# Patient Record
Sex: MALE | Race: WHITE | NOT HISPANIC OR LATINO | Employment: OTHER | URBAN - METROPOLITAN AREA
[De-identification: names, ages, dates, MRNs, and addresses within clinical notes are randomized per-mention and may not be internally consistent; named-entity substitution may affect disease eponyms.]

---

## 2017-01-30 DIAGNOSIS — E78.5 HYPERLIPIDEMIA, UNSPECIFIED HYPERLIPIDEMIA TYPE: Primary | ICD-10-CM

## 2017-01-30 LAB
ALBUMIN SERPL-MCNC: 4.8 G/DL (ref 3.5–5.2)
ALBUMIN/GLOB SERPL: 1.8 G/DL
ALP SERPL-CCNC: 67 U/L (ref 39–117)
ALT SERPL-CCNC: 24 U/L (ref 1–41)
AST SERPL-CCNC: 18 U/L (ref 1–40)
BILIRUB SERPL-MCNC: 0.4 MG/DL (ref 0.1–1.2)
BUN SERPL-MCNC: 11 MG/DL (ref 6–20)
BUN/CREAT SERPL: 11.6 (ref 7–25)
CALCIUM SERPL-MCNC: 9.9 MG/DL (ref 8.6–10.5)
CHLORIDE SERPL-SCNC: 101 MMOL/L (ref 98–107)
CHOLEST SERPL-MCNC: 232 MG/DL (ref 0–200)
CO2 SERPL-SCNC: 26.3 MMOL/L (ref 22–29)
CREAT SERPL-MCNC: 0.95 MG/DL (ref 0.76–1.27)
GLOBULIN SER CALC-MCNC: 2.6 GM/DL
GLUCOSE SERPL-MCNC: 111 MG/DL (ref 65–99)
HDLC SERPL-MCNC: 57 MG/DL (ref 40–60)
LDLC SERPL CALC-MCNC: 139 MG/DL (ref 0–100)
LDLC/HDLC SERPL: 2.44 {RATIO}
POTASSIUM SERPL-SCNC: 4.7 MMOL/L (ref 3.5–5.2)
PROT SERPL-MCNC: 7.4 G/DL (ref 6–8.5)
SODIUM SERPL-SCNC: 141 MMOL/L (ref 136–145)
TRIGL SERPL-MCNC: 180 MG/DL (ref 0–150)
VLDLC SERPL CALC-MCNC: 36 MG/DL (ref 5–40)

## 2017-02-10 ENCOUNTER — OFFICE VISIT (OUTPATIENT)
Dept: FAMILY MEDICINE CLINIC | Facility: CLINIC | Age: 62
End: 2017-02-10

## 2017-02-10 VITALS
WEIGHT: 204.4 LBS | HEART RATE: 72 BPM | OXYGEN SATURATION: 98 % | BODY MASS INDEX: 30.27 KG/M2 | RESPIRATION RATE: 16 BRPM | DIASTOLIC BLOOD PRESSURE: 72 MMHG | HEIGHT: 69 IN | SYSTOLIC BLOOD PRESSURE: 122 MMHG | TEMPERATURE: 98.4 F

## 2017-02-10 DIAGNOSIS — I10 ESSENTIAL HYPERTENSION: Primary | ICD-10-CM

## 2017-02-10 DIAGNOSIS — I51.9 HEART DISEASE: ICD-10-CM

## 2017-02-10 DIAGNOSIS — E78.2 MIXED HYPERLIPIDEMIA: ICD-10-CM

## 2017-02-10 DIAGNOSIS — J10.1 INFLUENZA B: ICD-10-CM

## 2017-02-10 DIAGNOSIS — M50.90 CERVICAL DISC DISEASE: ICD-10-CM

## 2017-02-10 DIAGNOSIS — R50.9 FEVER AND CHILLS: ICD-10-CM

## 2017-02-10 LAB
EXPIRATION DATE: ABNORMAL
FLUAV AG NPH QL: NEGATIVE
FLUBV AG NPH QL: POSITIVE
INTERNAL CONTROL: ABNORMAL
Lab: ABNORMAL

## 2017-02-10 PROCEDURE — 87804 INFLUENZA ASSAY W/OPTIC: CPT | Performed by: NURSE PRACTITIONER

## 2017-02-10 PROCEDURE — 99214 OFFICE O/P EST MOD 30 MIN: CPT | Performed by: NURSE PRACTITIONER

## 2017-02-10 RX ORDER — CYCLOBENZAPRINE HCL 10 MG
10 TABLET ORAL 3 TIMES DAILY PRN
Qty: 90 TABLET | Refills: 1 | Status: SHIPPED | OUTPATIENT
Start: 2017-02-10 | End: 2017-09-07

## 2017-02-10 RX ORDER — ASPIRIN 81 MG/1
81 TABLET ORAL EVERY MORNING
COMMUNITY

## 2017-02-10 RX ORDER — METHYLPREDNISOLONE 4 MG/1
TABLET ORAL
Qty: 21 TABLET | Refills: 0 | Status: SHIPPED | OUTPATIENT
Start: 2017-02-10 | End: 2017-06-09

## 2017-02-10 RX ORDER — UBIDECARENONE 75 MG
50 CAPSULE ORAL DAILY
COMMUNITY
End: 2019-07-10

## 2017-02-10 RX ORDER — MULTIVIT WITH MINERALS/LUTEIN
250 TABLET ORAL EVERY MORNING
COMMUNITY

## 2017-02-10 RX ORDER — OSELTAMIVIR PHOSPHATE 75 MG/1
75 CAPSULE ORAL 2 TIMES DAILY
Qty: 10 CAPSULE | Refills: 0 | Status: SHIPPED | OUTPATIENT
Start: 2017-02-10 | End: 2017-06-09

## 2017-02-10 NOTE — PROGRESS NOTES
Subjective   Sharath Montero is a 61 y.o. male. Patient is here today for   Chief Complaint   Patient presents with   • Follow-up     labs           Vitals:    02/10/17 0835   BP: 122/72   Pulse: 72   Resp: 16   Temp: 98.4 °F (36.9 °C)   SpO2: 98%           Past Medical History   Diagnosis Date   • Cervical disc disease    • Coronary artery disease    • Heart disease    • History of positive PPD      as a child    • Hyperlipidemia    • Hypertension       No Known Allergies   Social History     Social History   • Marital status:      Spouse name: N/A   • Number of children: N/A   • Years of education: N/A     Occupational History   • Not on file.     Social History Main Topics   • Smoking status: Never Smoker   • Smokeless tobacco: Not on file   • Alcohol use Yes      Comment: 4/week    • Drug use: No   • Sexual activity: Not on file     Other Topics Concern   • Not on file     Social History Narrative        Current Outpatient Prescriptions:   •  aspirin 81 MG EC tablet, Take 81 mg by mouth Daily., Disp: , Rfl:   •  vitamin B-12 (CYANOCOBALAMIN) 100 MCG tablet, Take 50 mcg by mouth Daily., Disp: , Rfl:   •  vitamin C (ASCORBIC ACID) 250 MG tablet, Take 250 mg by mouth Daily., Disp: , Rfl:   •  cyclobenzaprine (FLEXERIL) 10 MG tablet, Take 1 tablet by mouth 3 (Three) Times a Day As Needed for muscle spasms., Disp: 90 tablet, Rfl: 1  •  MethylPREDNISolone (MEDROL, QUINTON,) 4 MG tablet, Take as directed on package instructions., Disp: 21 tablet, Rfl: 0  •  oseltamivir (TAMIFLU) 75 MG capsule, Take 1 capsule by mouth 2 (Two) Times a Day., Disp: 10 capsule, Rfl: 0  •  ramipril (ALTACE) 10 MG capsule, Take 1 capsule by mouth daily., Disp: 90 capsule, Rfl: 1  •  simvastatin (ZOCOR) 80 MG tablet, Take 1/2 tablet po QHS, Disp: 90 tablet, Rfl: 1     Objective   History of Present Illness Sharath is here today for a lab follow up related to his hyperlipidemia. He also has a history of CAD and HTN.   He is currently on  statin therapy with 40mg Zocor daily.  He admits to non-compliance with diet and exercise but does report taking medication as directed.  Today his lipid panel is worse than labs obtained in August 2016 with elevation in total cholesterol and LDL.  He also is concerned about his neck pain and cold like symptoms that he has had for the past couple of days.  He is reporting fatigue, low grade fever, nasal congestion and body aches.  He does have a history of cervical disc disease, but this exacerbation began 2 days ago after using a chain saw.  He denies numbness, weakness, or chest pain.    The following portions of the patient's history were reviewed and updated as appropriate: allergies, current medications, past family history, past medical history, past social history, past surgical history and problem list.  Review of Systems   Constitutional: Positive for fatigue and fever. Negative for chills.   HENT: Positive for congestion, rhinorrhea, sinus pressure and sore throat. Negative for sneezing.    Respiratory: Positive for cough and shortness of breath. Negative for chest tightness and wheezing.    Cardiovascular: Negative for chest pain.   Musculoskeletal: Positive for back pain and neck pain.   Neurological: Positive for headaches. Negative for dizziness, weakness, light-headedness and numbness.       Physical Exam   Constitutional: He is oriented to person, place, and time. Vital signs are normal. He appears well-developed. He is cooperative. No distress.   HENT:   Head: Normocephalic.   Right Ear: Tympanic membrane, external ear and ear canal normal.   Left Ear: Tympanic membrane, external ear and ear canal normal.   Nose: Rhinorrhea present. Right sinus exhibits no maxillary sinus tenderness and no frontal sinus tenderness. Left sinus exhibits no maxillary sinus tenderness and no frontal sinus tenderness.   Mouth/Throat: Uvula is midline. Posterior oropharyngeal erythema present.   Neck: Neck supple.    Cardiovascular: Regular rhythm and normal heart sounds.    Pulmonary/Chest: Effort normal and breath sounds normal.   Musculoskeletal:        Right shoulder: He exhibits pain. He exhibits normal range of motion, no spasm and normal strength.        Cervical back: He exhibits pain. He exhibits normal range of motion, no tenderness and no spasm.   Neurological: He is alert and oriented to person, place, and time. He has normal strength.   Skin: Skin is warm, dry and intact.   Psychiatric: He has a normal mood and affect.   Vitals reviewed.      ASSESSMENT  Problem List Items Addressed This Visit     Heart disease    Hyperlipidemia    Essential hypertension - Primary    Cervical disc disease      Other Visit Diagnoses     Influenza B        Relevant Medications    oseltamivir (TAMIFLU) 75 MG capsule    Fever and chills        Relevant Orders    POC Influenza A / B (Completed)          PLAN    There are no Patient Instructions on file for this visit.  No Follow-up on file.    Discussed with patient the importance of diet and exercise in addition to mediation compliance in order to decrease his cardiac risks, he verbalized understanding.    Flu swab is positive for influenza B and will give Tamiflu.  Encouraged rest, plenty of fluids, attention to hand hygiene, cough etiquette, and need to disinfect household to reduce transmission.  He may use acetaminophen/ibuprofen for pain/fever.  No work until fever free x2 days.  Flexeril and Medrol Dose pack as directed for back/neck/shoulder pain.  For persisent symptoms unrelieved by medications, discussed obtaining imaging for further evaluation and PT  Patient agreeable to plan. He will follow up if symptoms worsen or no improvement in neck pain   Follow up in office with labs in 4 months, (CMP, LIPID panel) or sooner if symptoms worsen, fail to improve, or new problems/concerns develop.    JESSICA Pena NP Student documenting for and evaluating with MYLES Covarrubias.

## 2017-05-09 ENCOUNTER — TELEPHONE (OUTPATIENT)
Dept: FAMILY MEDICINE CLINIC | Facility: CLINIC | Age: 62
End: 2017-05-09

## 2017-05-09 RX ORDER — RAMIPRIL 10 MG/1
10 CAPSULE ORAL DAILY
Qty: 90 CAPSULE | Refills: 1 | Status: SHIPPED | OUTPATIENT
Start: 2017-05-09 | End: 2017-05-16 | Stop reason: SDUPTHER

## 2017-05-09 RX ORDER — SIMVASTATIN 80 MG
TABLET ORAL
Qty: 90 TABLET | Refills: 1 | Status: SHIPPED | OUTPATIENT
Start: 2017-05-09 | End: 2017-05-16 | Stop reason: SDUPTHER

## 2017-05-16 RX ORDER — SIMVASTATIN 80 MG
TABLET ORAL
Qty: 90 TABLET | Refills: 1 | Status: SHIPPED | OUTPATIENT
Start: 2017-05-16 | End: 2017-09-27 | Stop reason: SDUPTHER

## 2017-05-16 RX ORDER — RAMIPRIL 10 MG/1
10 CAPSULE ORAL DAILY
Qty: 90 CAPSULE | Refills: 1 | Status: SHIPPED | OUTPATIENT
Start: 2017-05-16 | End: 2017-09-27 | Stop reason: SDUPTHER

## 2017-06-09 ENCOUNTER — OFFICE VISIT (OUTPATIENT)
Dept: FAMILY MEDICINE CLINIC | Facility: CLINIC | Age: 62
End: 2017-06-09

## 2017-06-09 VITALS
TEMPERATURE: 97.5 F | DIASTOLIC BLOOD PRESSURE: 68 MMHG | SYSTOLIC BLOOD PRESSURE: 120 MMHG | WEIGHT: 203.8 LBS | HEART RATE: 66 BPM | BODY MASS INDEX: 30.18 KG/M2 | HEIGHT: 69 IN | OXYGEN SATURATION: 94 %

## 2017-06-09 DIAGNOSIS — E78.2 MIXED HYPERLIPIDEMIA: ICD-10-CM

## 2017-06-09 DIAGNOSIS — I10 ESSENTIAL HYPERTENSION: Primary | ICD-10-CM

## 2017-06-09 DIAGNOSIS — I51.9 HEART DISEASE: ICD-10-CM

## 2017-06-09 PROCEDURE — 99213 OFFICE O/P EST LOW 20 MIN: CPT | Performed by: NURSE PRACTITIONER

## 2017-06-09 NOTE — PROGRESS NOTES
Subjective   Sharath Montero is a 61 y.o. male. Patient is here today for   Chief Complaint   Patient presents with   • Hyperlipidemia     4 month follow up           Vitals:    06/09/17 0759   BP: 120/68   Pulse: 66   Temp: 97.5 °F (36.4 °C)   SpO2: 94%       The following portions of the patient's history were reviewed and updated as appropriate: allergies, current medications, past family history, past medical history, past social history, past surgical history and problem list.    Past Medical History:   Diagnosis Date   • Cervical disc disease    • Coronary artery disease    • Heart disease    • History of positive PPD     as a child    • Hyperlipidemia    • Hypertension       No Known Allergies   Social History     Social History   • Marital status:      Spouse name: N/A   • Number of children: N/A   • Years of education: N/A     Occupational History   • Not on file.     Social History Main Topics   • Smoking status: Never Smoker   • Smokeless tobacco: Not on file   • Alcohol use Yes      Comment: 4/week    • Drug use: No   • Sexual activity: Not on file     Other Topics Concern   • Not on file     Social History Narrative        Current Outpatient Prescriptions:   •  aspirin 81 MG EC tablet, Take 81 mg by mouth Daily., Disp: , Rfl:   •  cyclobenzaprine (FLEXERIL) 10 MG tablet, Take 1 tablet by mouth 3 (Three) Times a Day As Needed for muscle spasms., Disp: 90 tablet, Rfl: 1  •  MethylPREDNISolone (MEDROL, QUINTON,) 4 MG tablet, Take as directed on package instructions., Disp: 21 tablet, Rfl: 0  •  oseltamivir (TAMIFLU) 75 MG capsule, Take 1 capsule by mouth 2 (Two) Times a Day., Disp: 10 capsule, Rfl: 0  •  ramipril (ALTACE) 10 MG capsule, Take 1 capsule by mouth Daily., Disp: 90 capsule, Rfl: 1  •  simvastatin (ZOCOR) 80 MG tablet, Take 1/2 tablet po QHS, Disp: 90 tablet, Rfl: 1  •  vitamin B-12 (CYANOCOBALAMIN) 100 MCG tablet, Take 50 mcg by mouth Daily., Disp: , Rfl:   •  vitamin C (ASCORBIC ACID) 250  MG tablet, Take 250 mg by mouth Daily., Disp: , Rfl:      Objective   History of Present Illness  Sharath is here for a 4 month follow up for HTN, and HLD. He also has a history of CAD and has seen Dr Mcintosh in the past. He has been compliant with his medication and is not experiencing any side effects. He was not scheduled for labs prior to this appt but he is fasting. He denies CP, SOA, or palpiations. He still travels a lot but is working on eating healthier and losing weight. He has no complaints today.     Review of Systems   Constitutional: Negative for chills, fatigue and fever.   HENT: Negative.    Respiratory: Negative for cough, chest tightness, shortness of breath and wheezing.    Cardiovascular: Negative for chest pain, palpitations and leg swelling.   Genitourinary: Negative.    Musculoskeletal: Positive for neck pain (occasional ). Negative for gait problem.   Skin: Negative for rash.   Psychiatric/Behavioral: The patient is not nervous/anxious.        Physical Exam   Constitutional: Vital signs are normal. He appears well-developed and well-nourished. No distress.   HENT:   Nose: Nose normal.   Mouth/Throat: Mucous membranes are normal.   Neck: Trachea normal. Normal carotid pulses present. Carotid bruit is not present.   Cardiovascular: Normal rate, regular rhythm and normal heart sounds.    Pulmonary/Chest: Effort normal and breath sounds normal.   Musculoskeletal:        Right lower leg: He exhibits no swelling and no edema.        Left lower leg: He exhibits no swelling.   Neurological: He is alert.   Skin: Skin is warm and dry. No rash noted.   Psychiatric: He has a normal mood and affect. Cognition and memory are normal.       ASSESSMENT  Problem List Items Addressed This Visit     Heart disease    Hyperlipidemia    Essential hypertension - Primary          PLAN    Will check labs today and call with results  Heart healthy diet and exercise for AHA guidelines  His blood pressure is well  controlled  Continue current medications  Follow up in 6 months or sooner if needed

## 2017-06-10 LAB
ALBUMIN SERPL-MCNC: 4.6 G/DL (ref 3.5–5.5)
ALBUMIN/GLOB SERPL: 1.8 {RATIO} (ref 1.2–2.2)
ALP SERPL-CCNC: 61 IU/L (ref 39–117)
ALT SERPL-CCNC: 24 IU/L (ref 0–44)
AST SERPL-CCNC: 24 IU/L (ref 0–40)
BILIRUB SERPL-MCNC: 0.3 MG/DL (ref 0–1.2)
BUN SERPL-MCNC: 22 MG/DL (ref 6–24)
BUN/CREAT SERPL: 21 (ref 9–20)
CALCIUM SERPL-MCNC: 9.5 MG/DL (ref 8.7–10.2)
CHLORIDE SERPL-SCNC: 102 MMOL/L (ref 96–106)
CHOLEST SERPL-MCNC: 154 MG/DL (ref 100–199)
CO2 SERPL-SCNC: 21 MMOL/L (ref 18–29)
CREAT SERPL-MCNC: 1.06 MG/DL (ref 0.76–1.27)
GLOBULIN SER CALC-MCNC: 2.5 G/DL (ref 1.5–4.5)
GLUCOSE SERPL-MCNC: 101 MG/DL (ref 65–99)
HDLC SERPL-MCNC: 50 MG/DL
LDLC SERPL CALC-MCNC: 84 MG/DL (ref 0–99)
LDLC/HDLC SERPL: 1.7 RATIO UNITS (ref 0–3.6)
POTASSIUM SERPL-SCNC: 4.3 MMOL/L (ref 3.5–5.2)
PROT SERPL-MCNC: 7.1 G/DL (ref 6–8.5)
SODIUM SERPL-SCNC: 142 MMOL/L (ref 134–144)
TRIGL SERPL-MCNC: 99 MG/DL (ref 0–149)
VLDLC SERPL CALC-MCNC: 20 MG/DL (ref 5–40)

## 2017-06-12 ENCOUNTER — TELEPHONE (OUTPATIENT)
Dept: FAMILY MEDICINE CLINIC | Facility: CLINIC | Age: 62
End: 2017-06-12

## 2017-06-12 NOTE — TELEPHONE ENCOUNTER
Reviewed labs all look good  I called the patient to discuss and left a VM for him to call me back

## 2017-09-07 ENCOUNTER — OFFICE VISIT (OUTPATIENT)
Dept: FAMILY MEDICINE CLINIC | Facility: CLINIC | Age: 62
End: 2017-09-07

## 2017-09-07 VITALS
WEIGHT: 210 LBS | RESPIRATION RATE: 18 BRPM | DIASTOLIC BLOOD PRESSURE: 70 MMHG | TEMPERATURE: 97.9 F | HEART RATE: 77 BPM | SYSTOLIC BLOOD PRESSURE: 130 MMHG | BODY MASS INDEX: 31.1 KG/M2 | HEIGHT: 69 IN

## 2017-09-07 DIAGNOSIS — J02.9 PHARYNGITIS, UNSPECIFIED ETIOLOGY: Primary | ICD-10-CM

## 2017-09-07 LAB
EXPIRATION DATE: NORMAL
INTERNAL CONTROL: NORMAL
Lab: NORMAL
S PYO AG THROAT QL: NEGATIVE

## 2017-09-07 PROCEDURE — 87880 STREP A ASSAY W/OPTIC: CPT | Performed by: INTERNAL MEDICINE

## 2017-09-07 PROCEDURE — 99213 OFFICE O/P EST LOW 20 MIN: CPT | Performed by: INTERNAL MEDICINE

## 2017-09-07 NOTE — PROGRESS NOTES
Subjective   Sharath Montero is a 61 y.o. male. Patient is here today for   Chief Complaint   Patient presents with   • Sore Throat     x 1.5 days           Vitals:    09/07/17 1423   BP: 130/70   Pulse: 77   Resp: 18   Temp: 97.9 °F (36.6 °C)     The following portions of the patient's history were reviewed and updated as appropriate: allergies, current medications, past family history, past medical history, past social history, past surgical history and problem list.    Past Medical History:   Diagnosis Date   • Cervical disc disease    • Coronary artery disease    • Heart disease    • History of positive PPD     as a child    • Hyperlipidemia    • Hypertension       No Known Allergies   Social History     Social History   • Marital status:      Spouse name: N/A   • Number of children: N/A   • Years of education: N/A     Occupational History   • Not on file.     Social History Main Topics   • Smoking status: Never Smoker   • Smokeless tobacco: Not on file   • Alcohol use Yes      Comment: 4/week    • Drug use: No   • Sexual activity: Not on file     Other Topics Concern   • Not on file     Social History Narrative        Current Outpatient Prescriptions:   •  aspirin 81 MG EC tablet, Take 81 mg by mouth Daily., Disp: , Rfl:   •  ramipril (ALTACE) 10 MG capsule, Take 1 capsule by mouth Daily., Disp: 90 capsule, Rfl: 1  •  simvastatin (ZOCOR) 80 MG tablet, Take 1/2 tablet po QHS, Disp: 90 tablet, Rfl: 1  •  vitamin B-12 (CYANOCOBALAMIN) 100 MCG tablet, Take 50 mcg by mouth Daily., Disp: , Rfl:   •  vitamin C (ASCORBIC ACID) 250 MG tablet, Take 250 mg by mouth Daily., Disp: , Rfl:      Objective     History of Present Illness Sharath complains of a sore throat and some jaw tenderness that started today and half ago.  He denies any fever but has had some chills.  He also has some sinus congestion.  He denies coughing.    Review of Systems   Constitutional: Positive for chills. Negative for fever.   HENT:  Positive for congestion, postnasal drip and sore throat.    Respiratory: Negative for cough.        Physical Exam   Constitutional: He appears well-developed and well-nourished.   HENT:   Nose: Nose normal.   Mouth/Throat: No oropharyngeal exudate.   Pulmonary/Chest: Effort normal and breath sounds normal.   Lymphadenopathy:     He has no cervical adenopathy.   Vitals reviewed.      ASSESSMENT     Problem List Items Addressed This Visit        Respiratory    Pharyngitis - Primary    Relevant Orders    POC Rapid Strep A (Completed)          PLAN  Patient Instructions   Strep test today is negative.  You have a viral pharyngitis.  Take Tylenol or ibuprofen as needed.    No Follow-up on file.

## 2017-09-07 NOTE — PATIENT INSTRUCTIONS
Strep test today is negative.  You have a viral pharyngitis.  Take Tylenol or ibuprofen as needed.

## 2017-09-27 RX ORDER — RAMIPRIL 10 MG/1
10 CAPSULE ORAL DAILY
Qty: 90 CAPSULE | Refills: 1 | Status: SHIPPED | OUTPATIENT
Start: 2017-09-27 | End: 2018-02-19 | Stop reason: SDUPTHER

## 2017-09-27 RX ORDER — SIMVASTATIN 80 MG
TABLET ORAL
Qty: 90 TABLET | Refills: 1 | Status: SHIPPED | OUTPATIENT
Start: 2017-09-27 | End: 2018-02-19 | Stop reason: SDUPTHER

## 2017-10-03 ENCOUNTER — TELEPHONE (OUTPATIENT)
Dept: FAMILY MEDICINE CLINIC | Facility: CLINIC | Age: 62
End: 2017-10-03

## 2017-10-03 RX ORDER — BACLOFEN 10 MG/1
10 TABLET ORAL 3 TIMES DAILY
Qty: 30 TABLET | Refills: 1 | Status: SHIPPED | OUTPATIENT
Start: 2017-10-03 | End: 2019-04-03

## 2017-10-03 NOTE — TELEPHONE ENCOUNTER
Sharath called for some advice. He has been have some bilateral jaw pain at the TMJ for 3 weeks. He does tend to grind his teeth at night. He reports the area is sore and feels swollen but there is no redness, warmth. He denies fever, sore throat, chills or aches. It is uncomfortable when eating but he is still able to eat. He denies headaches, or ear pain  I recommend that he schedule an appt with his dentist and can try tylenol and warm compresses  I called him in some baclofen to see if that would relax his jaw at night   Suggest bite guard  If not a dental issue then recommend that he follow up with me. May need referral to TMJ specialist     ----- Message from Marion Perdue MA sent at 10/3/2017  2:17 PM EDT -----  Contact: PT  PT REQUESTED YOU CALL HIM HE DID NOT TELL ME WHAT FOR HE CAN BE REACHED -[phone]

## 2017-12-08 DIAGNOSIS — E78.2 MIXED HYPERLIPIDEMIA: Primary | ICD-10-CM

## 2017-12-08 DIAGNOSIS — Z11.59 NEED FOR HEPATITIS C SCREENING TEST: ICD-10-CM

## 2017-12-19 LAB
ALBUMIN SERPL-MCNC: 4.7 G/DL (ref 3.5–5.2)
ALBUMIN/GLOB SERPL: 1.7 G/DL
ALP SERPL-CCNC: 68 U/L (ref 39–117)
ALT SERPL-CCNC: 31 U/L (ref 1–41)
AST SERPL-CCNC: 25 U/L (ref 1–40)
BILIRUB SERPL-MCNC: 0.3 MG/DL (ref 0.1–1.2)
BUN SERPL-MCNC: 13 MG/DL (ref 6–20)
BUN/CREAT SERPL: 13.1 (ref 7–25)
CALCIUM SERPL-MCNC: 9.3 MG/DL (ref 8.6–10.5)
CHLORIDE SERPL-SCNC: 102 MMOL/L (ref 98–107)
CHOLEST SERPL-MCNC: 182 MG/DL (ref 0–200)
CO2 SERPL-SCNC: 22.9 MMOL/L (ref 22–29)
CREAT SERPL-MCNC: 0.99 MG/DL (ref 0.76–1.27)
GFR SERPLBLD CREATININE-BSD FMLA CKD-EPI: 77 ML/MIN/1.73
GFR SERPLBLD CREATININE-BSD FMLA CKD-EPI: 94 ML/MIN/1.73
GLOBULIN SER CALC-MCNC: 2.7 GM/DL
GLUCOSE SERPL-MCNC: 119 MG/DL (ref 65–99)
HCV AB S/CO SERPL IA: <0.1 S/CO RATIO (ref 0–0.9)
HCV AB SERPL QL IA: NORMAL
HDLC SERPL-MCNC: 50 MG/DL (ref 40–60)
LDLC SERPL CALC-MCNC: 99 MG/DL (ref 0–100)
LDLC/HDLC SERPL: 1.98 {RATIO}
POTASSIUM SERPL-SCNC: 4.9 MMOL/L (ref 3.5–5.2)
PROT SERPL-MCNC: 7.4 G/DL (ref 6–8.5)
SODIUM SERPL-SCNC: 140 MMOL/L (ref 136–145)
TRIGL SERPL-MCNC: 164 MG/DL (ref 0–150)
VLDLC SERPL CALC-MCNC: 32.8 MG/DL (ref 5–40)

## 2017-12-29 ENCOUNTER — OFFICE VISIT (OUTPATIENT)
Dept: FAMILY MEDICINE CLINIC | Facility: CLINIC | Age: 62
End: 2017-12-29

## 2017-12-29 VITALS
DIASTOLIC BLOOD PRESSURE: 72 MMHG | TEMPERATURE: 97.6 F | OXYGEN SATURATION: 98 % | SYSTOLIC BLOOD PRESSURE: 130 MMHG | RESPIRATION RATE: 16 BRPM | HEART RATE: 70 BPM | WEIGHT: 214.8 LBS | HEIGHT: 69 IN | BODY MASS INDEX: 31.81 KG/M2

## 2017-12-29 DIAGNOSIS — I10 ESSENTIAL HYPERTENSION: Primary | ICD-10-CM

## 2017-12-29 DIAGNOSIS — I51.9 HEART DISEASE: ICD-10-CM

## 2017-12-29 DIAGNOSIS — E78.2 MIXED HYPERLIPIDEMIA: ICD-10-CM

## 2017-12-29 DIAGNOSIS — R73.9 HYPERGLYCEMIA: ICD-10-CM

## 2017-12-29 PROBLEM — J02.9 PHARYNGITIS: Status: RESOLVED | Noted: 2017-09-07 | Resolved: 2017-12-29

## 2017-12-29 PROCEDURE — 99214 OFFICE O/P EST MOD 30 MIN: CPT | Performed by: NURSE PRACTITIONER

## 2017-12-29 NOTE — PROGRESS NOTES
Subjective   Elijah Montero is a 62 y.o. male. Patient is here today for   Chief Complaint   Patient presents with   • Follow-up     LABS HLD AND HTN           Vitals:    12/29/17 0810   BP: 130/72   Pulse: 70   Resp: 16   Temp: 97.6 °F (36.4 °C)   SpO2: 98%       The following portions of the patient's history were reviewed and updated as appropriate: allergies, current medications, past family history, past medical history, past social history, past surgical history and problem list.    Past Medical History:   Diagnosis Date   • Cervical disc disease    • Coronary artery disease    • Heart disease    • History of positive PPD     as a child    • Hyperlipidemia    • Hypertension       No Known Allergies   Social History     Social History   • Marital status:      Spouse name: N/A   • Number of children: N/A   • Years of education: N/A     Occupational History   • Not on file.     Social History Main Topics   • Smoking status: Never Smoker   • Smokeless tobacco: Never Used   • Alcohol use Yes      Comment: 4/week    • Drug use: No   • Sexual activity: Not on file     Other Topics Concern   • Not on file     Social History Narrative   • No narrative on file        Current Outpatient Prescriptions:   •  aspirin 81 MG EC tablet, Take 81 mg by mouth Daily., Disp: , Rfl:   •  baclofen (LIORESAL) 10 MG tablet, Take 1 tablet by mouth 3 (Three) Times a Day. As needed, Disp: 30 tablet, Rfl: 1  •  ramipril (ALTACE) 10 MG capsule, Take 1 capsule by mouth Daily., Disp: 90 capsule, Rfl: 1  •  simvastatin (ZOCOR) 80 MG tablet, Take 1/2 tablet po QHS, Disp: 90 tablet, Rfl: 1  •  vitamin B-12 (CYANOCOBALAMIN) 100 MCG tablet, Take 50 mcg by mouth Daily., Disp: , Rfl:   •  vitamin C (ASCORBIC ACID) 250 MG tablet, Take 250 mg by mouth Daily., Disp: , Rfl:      Objective   History of Present Illness   Elijah is here for a labs follow up. He has a history of CAD with stent placement, HLD, and HTN. He overall has been feeling well.  He has not been eating well but has been exercising. He has had not recent illness. He is compliant with his medication and is not experiencing any side effects. He denies any CP, SOA, or palpitations. He has no complaints today.     Review of Systems   Constitutional: Negative for chills, fatigue and fever.   HENT: Negative.    Eyes: Negative for visual disturbance.   Respiratory: Negative for cough, chest tightness, shortness of breath and wheezing.    Cardiovascular: Negative.    Gastrointestinal: Negative for blood in stool, constipation, diarrhea and nausea.   Genitourinary: Negative.    Musculoskeletal: Positive for neck pain (occasional, has chronic neck pain ). Negative for arthralgias.   Neurological: Negative for dizziness and headaches.   Psychiatric/Behavioral: Negative for dysphoric mood and sleep disturbance. The patient is not nervous/anxious.        Physical Exam   Constitutional: Vital signs are normal. He appears well-developed and well-nourished. No distress.   HENT:   Right Ear: Tympanic membrane and ear canal normal.   Left Ear: Tympanic membrane and ear canal normal.   Mouth/Throat: Mucous membranes are normal.   Neck: Carotid bruit is not present.   Cardiovascular: Normal rate, regular rhythm and normal heart sounds.    Pulmonary/Chest: Effort normal and breath sounds normal.   Musculoskeletal:        Right lower leg: He exhibits no edema.        Left lower leg: He exhibits no edema.   Neurological: He is alert.   Skin: Skin is warm, dry and intact.   Psychiatric: He has a normal mood and affect.        ASSESSMENT  Problem List Items Addressed This Visit     Heart disease    Relevant Orders    Ambulatory Referral to Cardiology    Hyperlipidemia    Essential hypertension - Primary      Other Visit Diagnoses     Hyperglycemia              PLAN  His labs are stable, His glucose was elevated at 119 and he believes this is due to the increased amount of sugar he has eaten recently.   We discussed  avoiding starch and avoiding sugar  Continue exercise  He has not seen a cardiologist in 2-3 years so will refer to cardiology for recheck   HM updated, declined flu vaccine, information given on cologuard vs colonoscopy. He will let me know what he decides and will place referral if needed, info given on zostavax, would like to wait on this  Follow up in sept for recheck with fasting labs lipid panel, cmp, UA, and hgba1c     Return in about 9 months (around 9/29/2018) for Recheck, with labs .

## 2017-12-29 NOTE — PATIENT INSTRUCTIONS
His labs are stable, His glucose was elevated at 119 and he believes this is due to the increased amount of sugar he has eaten recently.   We discussed avoiding starch and avoiding sugar  Continue exercise  He has not seen a cardiologist in 2-3 years so will refer to cardiology for recheck   HM updated, declined flu vaccine, information given on cologuard vs colonoscopy. He will let me know what he decides and will place referral if needed, info given on zostavax, would like to wait on this  Follow up in sept for recheck with fasting labs lipid panel, cmp, UA, and hgba1c

## 2018-02-19 RX ORDER — RAMIPRIL 10 MG/1
10 CAPSULE ORAL DAILY
Qty: 90 CAPSULE | Refills: 3 | Status: SHIPPED | OUTPATIENT
Start: 2018-02-19 | End: 2019-03-04 | Stop reason: SDUPTHER

## 2018-02-19 RX ORDER — SIMVASTATIN 80 MG
TABLET ORAL
Qty: 90 TABLET | Refills: 3 | Status: SHIPPED | OUTPATIENT
Start: 2018-02-19 | End: 2019-03-26 | Stop reason: SDUPTHER

## 2018-08-08 ENCOUNTER — TELEPHONE (OUTPATIENT)
Dept: FAMILY MEDICINE CLINIC | Facility: CLINIC | Age: 63
End: 2018-08-08

## 2018-08-08 NOTE — TELEPHONE ENCOUNTER
Pt sent over a message through my chart asking for refills. He sent his message through billing and it was forwarded to me. He has a year of refills sent in feb so he does not need refills now. He is due for labs and a follow up , so he should schedule that at the end of sept   I called the patient and an appt was scheduled

## 2018-09-12 DIAGNOSIS — I10 HYPERTENSION, UNSPECIFIED TYPE: ICD-10-CM

## 2018-09-12 DIAGNOSIS — Z79.899 LONG TERM USE OF DRUG: ICD-10-CM

## 2018-09-12 DIAGNOSIS — Z13.1 SCREENING FOR DIABETES MELLITUS: ICD-10-CM

## 2018-09-12 DIAGNOSIS — E78.5 HYPERLIPIDEMIA, UNSPECIFIED HYPERLIPIDEMIA TYPE: Primary | ICD-10-CM

## 2018-09-12 DIAGNOSIS — Z12.5 ENCOUNTER FOR SCREENING FOR MALIGNANT NEOPLASM OF PROSTATE: ICD-10-CM

## 2018-09-13 LAB
ALBUMIN SERPL-MCNC: 5.1 G/DL (ref 3.5–5.2)
ALBUMIN/GLOB SERPL: 2.3 G/DL
ALP SERPL-CCNC: 68 U/L (ref 39–117)
ALT SERPL-CCNC: 25 U/L (ref 1–41)
APPEARANCE UR: CLEAR
AST SERPL-CCNC: 26 U/L (ref 1–40)
BACTERIA #/AREA URNS HPF: NORMAL /HPF
BILIRUB SERPL-MCNC: 0.5 MG/DL (ref 0.1–1.2)
BILIRUB UR QL STRIP: NEGATIVE
BUN SERPL-MCNC: 14 MG/DL (ref 8–23)
BUN/CREAT SERPL: 12.4 (ref 7–25)
CALCIUM SERPL-MCNC: 9.6 MG/DL (ref 8.6–10.5)
CASTS URNS MICRO: NORMAL
CHLORIDE SERPL-SCNC: 102 MMOL/L (ref 98–107)
CHOLEST SERPL-MCNC: 149 MG/DL (ref 0–200)
CO2 SERPL-SCNC: 25 MMOL/L (ref 22–29)
COLOR UR: YELLOW
CREAT SERPL-MCNC: 1.13 MG/DL (ref 0.76–1.27)
EPI CELLS #/AREA URNS HPF: NORMAL /HPF
GLOBULIN SER CALC-MCNC: 2.2 GM/DL
GLUCOSE SERPL-MCNC: 100 MG/DL (ref 65–99)
GLUCOSE UR QL: NEGATIVE
HBA1C MFR BLD: 5.9 % (ref 4.8–5.6)
HDLC SERPL-MCNC: 49 MG/DL (ref 40–60)
HGB UR QL STRIP: (no result)
KETONES UR QL STRIP: NEGATIVE
LDLC SERPL CALC-MCNC: 80 MG/DL (ref 0–100)
LDLC/HDLC SERPL: 1.62 {RATIO}
LEUKOCYTE ESTERASE UR QL STRIP: NEGATIVE
NITRITE UR QL STRIP: NEGATIVE
PH UR STRIP: 5.5 [PH] (ref 5–8)
POTASSIUM SERPL-SCNC: 4.3 MMOL/L (ref 3.5–5.2)
PROT SERPL-MCNC: 7.3 G/DL (ref 6–8.5)
PROT UR QL STRIP: NEGATIVE
PSA SERPL-MCNC: 1.81 NG/ML (ref 0–4)
RBC #/AREA URNS HPF: NORMAL /HPF
SODIUM SERPL-SCNC: 141 MMOL/L (ref 136–145)
SP GR UR: 1.02 (ref 1–1.03)
TRIGL SERPL-MCNC: 102 MG/DL (ref 0–150)
UROBILINOGEN UR STRIP-MCNC: (no result) MG/DL
VLDLC SERPL CALC-MCNC: 20.4 MG/DL (ref 5–40)
WBC #/AREA URNS HPF: NORMAL /HPF

## 2018-09-28 ENCOUNTER — OFFICE VISIT (OUTPATIENT)
Dept: FAMILY MEDICINE CLINIC | Facility: CLINIC | Age: 63
End: 2018-09-28

## 2018-09-28 VITALS
OXYGEN SATURATION: 98 % | SYSTOLIC BLOOD PRESSURE: 140 MMHG | HEIGHT: 69 IN | RESPIRATION RATE: 16 BRPM | DIASTOLIC BLOOD PRESSURE: 80 MMHG | BODY MASS INDEX: 30.66 KG/M2 | TEMPERATURE: 98.2 F | WEIGHT: 207 LBS | HEART RATE: 73 BPM

## 2018-09-28 DIAGNOSIS — R73.01 ELEVATED FASTING GLUCOSE: ICD-10-CM

## 2018-09-28 DIAGNOSIS — I10 ESSENTIAL HYPERTENSION: ICD-10-CM

## 2018-09-28 DIAGNOSIS — Z12.11 ENCOUNTER FOR SCREENING COLONOSCOPY: ICD-10-CM

## 2018-09-28 DIAGNOSIS — E78.2 MIXED HYPERLIPIDEMIA: Primary | ICD-10-CM

## 2018-09-28 DIAGNOSIS — I51.9 HEART DISEASE: ICD-10-CM

## 2018-09-28 PROCEDURE — 99214 OFFICE O/P EST MOD 30 MIN: CPT | Performed by: NURSE PRACTITIONER

## 2018-09-28 NOTE — PROGRESS NOTES
Subjective   Elijah Montero is a 62 y.o. male. Patient is here today for   Chief Complaint   Patient presents with   • Hypertension   • Hyperlipidemia          Vitals:    09/28/18 0757   BP: 140/80   Pulse: 73   Resp: 16   Temp: 98.2 °F (36.8 °C)   SpO2: 98%       The following portions of the patient's history were reviewed and updated as appropriate: allergies, current medications, past family history, past medical history, past social history, past surgical history and problem list.    Past Medical History:   Diagnosis Date   • Cervical disc disease    • Chest pain     possibly due to pericarditis   • Coronary artery disease     mild to moderate   • Heart disease    • History of positive PPD     as a child    • Hyperlipidemia    • Hypertension       No Known Allergies   Social History     Social History   • Marital status:      Spouse name: N/A   • Number of children: N/A   • Years of education: N/A     Occupational History   • Not on file.     Social History Main Topics   • Smoking status: Never Smoker   • Smokeless tobacco: Never Used   • Alcohol use Yes      Comment: 4/week    • Drug use: No   • Sexual activity: Not on file     Other Topics Concern   • Not on file     Social History Narrative   • No narrative on file        Current Outpatient Prescriptions:   •  aspirin 81 MG EC tablet, Take 81 mg by mouth Daily., Disp: , Rfl:   •  baclofen (LIORESAL) 10 MG tablet, Take 1 tablet by mouth 3 (Three) Times a Day. As needed, Disp: 30 tablet, Rfl: 1  •  ramipril (ALTACE) 10 MG capsule, Take 1 capsule by mouth Daily., Disp: 90 capsule, Rfl: 3  •  simvastatin (ZOCOR) 80 MG tablet, Take 1/2 tablet po QHS, Disp: 90 tablet, Rfl: 3  •  vitamin B-12 (CYANOCOBALAMIN) 100 MCG tablet, Take 50 mcg by mouth Daily., Disp: , Rfl:   •  vitamin C (ASCORBIC ACID) 250 MG tablet, Take 250 mg by mouth Daily., Disp: , Rfl:      Objective   History of Present Illness   Patient is here for follow-up of elevated blood pressure,  HLD, CAD, and elevated fasting glucose. He is exercising occasionally and is not always  adherent to a low-salt diet. Blood pressure is well controlled at home. Cardiac symptoms: none Patient denies chest pain, chest pressure/discomfort, cough, dyspnea, exertional chest pressure/discomfort, fatigue, irregular heart beat, lower extremity edema, near-syncope, orthopnea, palpitations, paroxysmal nocturnal dyspnea, syncope and tachypnea. Patient is adherent to medications and is not experiencing any side effects.   I reviewed his labs with him and gave him a copy     Review of Systems   Constitutional: Negative for chills, fatigue, fever and unexpected weight change.   HENT: Negative.    Respiratory: Negative for cough, chest tightness, shortness of breath and wheezing.    Cardiovascular: Negative for chest pain, palpitations and leg swelling.   Gastrointestinal: Negative.    Genitourinary: Negative.    Musculoskeletal: Negative for arthralgias.   Psychiatric/Behavioral: Negative for dysphoric mood and sleep disturbance.       Physical Exam   Constitutional: Vital signs are normal. He appears well-developed and well-nourished. He does not appear ill. No distress.   Cardiovascular: Normal rate, regular rhythm and normal heart sounds.    BP recheck 132/78   Pulmonary/Chest: Effort normal and breath sounds normal.   Musculoskeletal:        Right lower leg: He exhibits no swelling.        Left lower leg: He exhibits no swelling.   Neurological: He is alert.   Skin: Skin is warm, dry and intact.   Psychiatric: He has a normal mood and affect.       ASSESSMENT  Problem List Items Addressed This Visit     Heart disease    Hyperlipidemia - Primary    Essential hypertension      Other Visit Diagnoses     Encounter for screening colonoscopy        Relevant Orders    Ambulatory Referral to General Surgery    Elevated fasting glucose              PLAN  His lipid panel is normal  Fasting glucose is 100, a1c is 5.9 which is increased  risk for diabetes, we discussed dietary changes such as decreasing sugars and starches, exercise  PSA was normal  Blood pressure has been well controlled   He has a history of CAD so I recommend that he follow up with cardiology, referral was placed 6 months ago.   Referral for screening cscope  Declined flu vaccine  Continue current medications   Follow up in 6 months with hgba1c, lipid panel, cmp, cbc, tsh     Return in about 6 months (around 3/28/2019) for with labs.

## 2019-03-04 RX ORDER — RAMIPRIL 10 MG/1
10 CAPSULE ORAL DAILY
Qty: 90 CAPSULE | Refills: 1 | Status: SHIPPED | OUTPATIENT
Start: 2019-03-04 | End: 2019-07-16 | Stop reason: HOSPADM

## 2019-03-04 RX ORDER — RAMIPRIL 10 MG/1
CAPSULE ORAL
Qty: 90 CAPSULE | Refills: 0 | OUTPATIENT
Start: 2019-03-04

## 2019-03-26 ENCOUNTER — TELEPHONE (OUTPATIENT)
Dept: FAMILY MEDICINE CLINIC | Facility: CLINIC | Age: 64
End: 2019-03-26

## 2019-03-26 RX ORDER — SIMVASTATIN 80 MG
TABLET ORAL
Qty: 90 TABLET | Refills: 1 | Status: SHIPPED | OUTPATIENT
Start: 2019-03-26 | End: 2019-05-06

## 2019-03-26 NOTE — TELEPHONE ENCOUNTER
COMPLETED PT HAS BEEN SCHEDULED APPT FOR FASTING LABS AND A FOLLOW UP.     ----- Message from Camilo Rodriguez sent at 3/26/2019  9:31 AM EDT -----  PT NEEDS SCRIPT REFILL FOR simvastatin (ZOCOR) 80 MG Take 1/2 tablet po QHS #90    PLEASE SEND TO [facility] ON [address] AND [address].  IF YOU HAVE ANY QUESTIONS PLEASE CONTACT [phone]

## 2019-03-28 DIAGNOSIS — R73.09 ELEVATED HEMOGLOBIN A1C: ICD-10-CM

## 2019-03-28 DIAGNOSIS — R53.83 FATIGUE, UNSPECIFIED TYPE: ICD-10-CM

## 2019-03-28 DIAGNOSIS — E78.2 MIXED HYPERLIPIDEMIA: Primary | ICD-10-CM

## 2019-03-29 LAB
ALBUMIN SERPL-MCNC: 4.8 G/DL (ref 3.5–5.2)
ALBUMIN/GLOB SERPL: 1.9 G/DL
ALP SERPL-CCNC: 59 U/L (ref 39–117)
ALT SERPL-CCNC: 30 U/L (ref 1–41)
AST SERPL-CCNC: 27 U/L (ref 1–40)
BASOPHILS # BLD AUTO: 0.04 10*3/MM3 (ref 0–0.2)
BASOPHILS NFR BLD AUTO: 0.7 % (ref 0–1.5)
BILIRUB SERPL-MCNC: 0.5 MG/DL (ref 0.2–1.2)
BUN SERPL-MCNC: 16 MG/DL (ref 8–23)
BUN/CREAT SERPL: 18.2 (ref 7–25)
CALCIUM SERPL-MCNC: 9.6 MG/DL (ref 8.6–10.5)
CHLORIDE SERPL-SCNC: 101 MMOL/L (ref 98–107)
CHOLEST SERPL-MCNC: 154 MG/DL (ref 0–200)
CO2 SERPL-SCNC: 26.5 MMOL/L (ref 22–29)
CREAT SERPL-MCNC: 0.88 MG/DL (ref 0.76–1.27)
EOSINOPHIL # BLD AUTO: 0.13 10*3/MM3 (ref 0–0.4)
EOSINOPHIL NFR BLD AUTO: 2.3 % (ref 0.3–6.2)
ERYTHROCYTE [DISTWIDTH] IN BLOOD BY AUTOMATED COUNT: 13.2 % (ref 12.3–15.4)
GLOBULIN SER CALC-MCNC: 2.5 GM/DL
GLUCOSE SERPL-MCNC: 108 MG/DL (ref 65–99)
HBA1C MFR BLD: 5.99 % (ref 4.8–5.6)
HCT VFR BLD AUTO: 50.4 % (ref 37.5–51)
HDLC SERPL-MCNC: 47 MG/DL (ref 40–60)
HGB BLD-MCNC: 15.4 G/DL (ref 13–17.7)
IMM GRANULOCYTES # BLD AUTO: 0.02 10*3/MM3 (ref 0–0.05)
IMM GRANULOCYTES NFR BLD AUTO: 0.4 % (ref 0–0.5)
LDLC SERPL CALC-MCNC: 89 MG/DL (ref 0–100)
LDLC/HDLC SERPL: 1.9 {RATIO}
LYMPHOCYTES # BLD AUTO: 1.52 10*3/MM3 (ref 0.7–3.1)
LYMPHOCYTES NFR BLD AUTO: 27 % (ref 19.6–45.3)
MCH RBC QN AUTO: 31.3 PG (ref 26.6–33)
MCHC RBC AUTO-ENTMCNC: 30.6 G/DL (ref 31.5–35.7)
MCV RBC AUTO: 102.4 FL (ref 79–97)
MONOCYTES # BLD AUTO: 0.86 10*3/MM3 (ref 0.1–0.9)
MONOCYTES NFR BLD AUTO: 15.2 % (ref 5–12)
NEUTROPHILS # BLD AUTO: 3.07 10*3/MM3 (ref 1.4–7)
NEUTROPHILS NFR BLD AUTO: 54.4 % (ref 42.7–76)
NRBC BLD AUTO-RTO: 0 /100 WBC (ref 0–0)
PLATELET # BLD AUTO: 177 10*3/MM3 (ref 140–450)
POTASSIUM SERPL-SCNC: 4.7 MMOL/L (ref 3.5–5.2)
PROT SERPL-MCNC: 7.3 G/DL (ref 6–8.5)
RBC # BLD AUTO: 4.92 10*6/MM3 (ref 4.14–5.8)
SODIUM SERPL-SCNC: 142 MMOL/L (ref 136–145)
TRIGL SERPL-MCNC: 88 MG/DL (ref 0–150)
TSH SERPL DL<=0.005 MIU/L-ACNC: 3.16 MIU/ML (ref 0.27–4.2)
VLDLC SERPL CALC-MCNC: 17.6 MG/DL (ref 5–40)
WBC # BLD AUTO: 5.64 10*3/MM3 (ref 3.4–10.8)

## 2019-04-03 ENCOUNTER — OFFICE VISIT (OUTPATIENT)
Dept: FAMILY MEDICINE CLINIC | Facility: CLINIC | Age: 64
End: 2019-04-03

## 2019-04-03 VITALS
HEIGHT: 69 IN | HEART RATE: 71 BPM | BODY MASS INDEX: 30.66 KG/M2 | OXYGEN SATURATION: 97 % | TEMPERATURE: 98.3 F | SYSTOLIC BLOOD PRESSURE: 116 MMHG | DIASTOLIC BLOOD PRESSURE: 68 MMHG | WEIGHT: 207 LBS | RESPIRATION RATE: 18 BRPM

## 2019-04-03 DIAGNOSIS — R73.01 ELEVATED FASTING GLUCOSE: ICD-10-CM

## 2019-04-03 DIAGNOSIS — E78.2 MIXED HYPERLIPIDEMIA: ICD-10-CM

## 2019-04-03 DIAGNOSIS — E66.9 OBESITY (BMI 30-39.9): ICD-10-CM

## 2019-04-03 DIAGNOSIS — I10 ESSENTIAL HYPERTENSION: Primary | ICD-10-CM

## 2019-04-03 DIAGNOSIS — R73.03 PREDIABETES: ICD-10-CM
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
## 2019-04-03 DIAGNOSIS — I51.9 HEART DISEASE: ICD-10-CM

## 2019-04-03 PROCEDURE — 99214 OFFICE O/P EST MOD 30 MIN: CPT | Performed by: NURSE PRACTITIONER

## 2019-04-03 RX ORDER — DOXYCYCLINE HYCLATE 100 MG/1
100 CAPSULE ORAL 2 TIMES DAILY
Qty: 20 CAPSULE | Refills: 0 | Status: SHIPPED | OUTPATIENT
Start: 2019-04-03 | End: 2019-05-06

## 2019-04-03 NOTE — PROGRESS NOTES
Subjective   Elijah Montero is a 63 y.o. male. Patient is here today for   Chief Complaint   Patient presents with   • Hyperlipidemia     lab follow up          Vitals:    04/03/19 1306   BP: 116/68   Pulse: 71   Resp: 18   Temp: 98.3 °F (36.8 °C)   SpO2: 97%     The following portions of the patient's history were reviewed and updated as appropriate: allergies, current medications, past family history, past medical history, past social history, past surgical history and problem list.    Past Medical History:   Diagnosis Date   • Cervical disc disease    • Chest pain     possibly due to pericarditis   • Coronary artery disease     mild to moderate   • Heart disease    • History of positive PPD     as a child    • Hyperlipidemia    • Hypertension       No Known Allergies   Social History     Socioeconomic History   • Marital status:      Spouse name: Not on file   • Number of children: Not on file   • Years of education: Not on file   • Highest education level: Not on file   Tobacco Use   • Smoking status: Never Smoker   • Smokeless tobacco: Never Used   Substance and Sexual Activity   • Alcohol use: Yes     Comment: 4/week    • Drug use: No        Current Outpatient Medications:   •  aspirin 81 MG EC tablet, Take 81 mg by mouth Daily., Disp: , Rfl:   •  ramipril (ALTACE) 10 MG capsule, Take 1 capsule by mouth Daily., Disp: 90 capsule, Rfl: 1  •  simvastatin (ZOCOR) 80 MG tablet, Take 1/2 tablet po QHS, Disp: 90 tablet, Rfl: 1  •  vitamin B-12 (CYANOCOBALAMIN) 100 MCG tablet, Take 50 mcg by mouth Daily., Disp: , Rfl:   •  vitamin C (ASCORBIC ACID) 250 MG tablet, Take 250 mg by mouth Daily., Disp: , Rfl:   •  doxycycline (VIBRAMYCIN) 100 MG capsule, Take 1 capsule by mouth 2 (Two) Times a Day., Disp: 20 capsule, Rfl: 0     Objective     Elijah is here for a follow up for HLD, HTN, and Prediabetes. He overall is feeling well. He has been compliant with his medications and is not experiencing any side effects. He  denies cp, soa, or palpitations. He exercises and is working on changing his diet.   He was bitten by a tick 5 days ago while hiking in the woods. His wife removed the Tick but the area has been red and pruritic. He denies fever, chills, arthralgias or weakness.   I reviewed his labs with him and gave him a copy       Hypertension   This is a chronic problem. The current episode started more than 1 year ago. The problem is unchanged. The problem is controlled. Pertinent negatives include no blurred vision, headaches or shortness of breath. There are no associated agents to hypertension. Risk factors for coronary artery disease include dyslipidemia and male gender. Current antihypertension treatment includes beta blockers. There are no compliance problems.          Review of Systems   Constitutional: Negative for chills, fatigue and fever.   HENT: Negative.    Eyes: Negative for blurred vision and visual disturbance.   Respiratory: Negative.  Negative for cough, chest tightness and shortness of breath.    Cardiovascular: Negative.    Gastrointestinal: Negative.    Genitourinary: Negative.    Musculoskeletal: Negative for arthralgias and myalgias.   Neurological: Negative for weakness and headaches.       Physical Exam   Constitutional: Vital signs are normal. He appears well-developed and well-nourished. No distress.   HENT:   Head: Normocephalic.   Mouth/Throat: Mucous membranes are normal.   Cardiovascular: Normal rate, regular rhythm and normal heart sounds.   Pulmonary/Chest: Effort normal and breath sounds normal.   Musculoskeletal: Normal range of motion.   Neurological: He is alert.   Skin: Skin is warm and dry.   Tick bite to right hip noted with 1 inch of surrounding erythema, no induration,  No bullseye rash noted    Psychiatric: He has a normal mood and affect.     Orders Only on 03/28/2019   Component Date Value Ref Range Status   • Total Cholesterol 03/29/2019 154  0 - 200 mg/dL Final   • Triglycerides  03/29/2019 88  0 - 150 mg/dL Final   • HDL Cholesterol 03/29/2019 47  40 - 60 mg/dL Final   • VLDL Cholesterol 03/29/2019 17.6  5 - 40 mg/dL Final   • LDL Cholesterol  03/29/2019 89  0 - 100 mg/dL Final   • LDL/HDL Ratio 03/29/2019 1.90   Final   • TSH 03/29/2019 3.160  0.270 - 4.200 mIU/mL Final   • Hemoglobin A1C 03/29/2019 5.99* 4.80 - 5.60 % Final    Comment: Hemoglobin A1C Ranges:  Increased Risk for Diabetes  5.7% to 6.4%  Diabetes                     >= 6.5%  Diabetic Goal                < 7.0%     • Glucose 03/29/2019 108* 65 - 99 mg/dL Final   • BUN 03/29/2019 16  8 - 23 mg/dL Final   • Creatinine 03/29/2019 0.88  0.76 - 1.27 mg/dL Final   • eGFR Non African Am 03/29/2019 88  >60 mL/min/1.73 Final   • eGFR African Am 03/29/2019 107  >60 mL/min/1.73 Final   • BUN/Creatinine Ratio 03/29/2019 18.2  7.0 - 25.0 Final   • Sodium 03/29/2019 142  136 - 145 mmol/L Final   • Potassium 03/29/2019 4.7  3.5 - 5.2 mmol/L Final   • Chloride 03/29/2019 101  98 - 107 mmol/L Final   • Total CO2 03/29/2019 26.5  22.0 - 29.0 mmol/L Final   • Calcium 03/29/2019 9.6  8.6 - 10.5 mg/dL Final   • Total Protein 03/29/2019 7.3  6.0 - 8.5 g/dL Final   • Albumin 03/29/2019 4.80  3.50 - 5.20 g/dL Final   • Globulin 03/29/2019 2.5  gm/dL Final   • A/G Ratio 03/29/2019 1.9  g/dL Final   • Total Bilirubin 03/29/2019 0.5  0.2 - 1.2 mg/dL Final   • Alkaline Phosphatase 03/29/2019 59  39 - 117 U/L Final   • AST (SGOT) 03/29/2019 27  1 - 40 U/L Final   • ALT (SGPT) 03/29/2019 30  1 - 41 U/L Final   • WBC 03/29/2019 5.64  3.40 - 10.80 10*3/mm3 Final   • RBC 03/29/2019 4.92  4.14 - 5.80 10*6/mm3 Final   • Hemoglobin 03/29/2019 15.4  13.0 - 17.7 g/dL Final   • Hematocrit 03/29/2019 50.4  37.5 - 51.0 % Final   • MCV 03/29/2019 102.4* 79.0 - 97.0 fL Final   • MCH 03/29/2019 31.3  26.6 - 33.0 pg Final   • MCHC 03/29/2019 30.6* 31.5 - 35.7 g/dL Final   • RDW 03/29/2019 13.2  12.3 - 15.4 % Final   • Platelets 03/29/2019 177  140 - 450 10*3/mm3 Final    • Neutrophil Rel % 03/29/2019 54.4  42.7 - 76.0 % Final   • Lymphocyte Rel % 03/29/2019 27.0  19.6 - 45.3 % Final   • Monocyte Rel % 03/29/2019 15.2* 5.0 - 12.0 % Final   • Eosinophil Rel % 03/29/2019 2.3  0.3 - 6.2 % Final   • Basophil Rel % 03/29/2019 0.7  0.0 - 1.5 % Final   • Neutrophils Absolute 03/29/2019 3.07  1.40 - 7.00 10*3/mm3 Final   • Lymphocytes Absolute 03/29/2019 1.52  0.70 - 3.10 10*3/mm3 Final   • Monocytes Absolute 03/29/2019 0.86  0.10 - 0.90 10*3/mm3 Final   • Eosinophils Absolute 03/29/2019 0.13  0.00 - 0.40 10*3/mm3 Final   • Basophils Absolute 03/29/2019 0.04  0.00 - 0.20 10*3/mm3 Final   • Immature Granulocyte Rel % 03/29/2019 0.4  0.0 - 0.5 % Final   • Immature Grans Absolute 03/29/2019 0.02  0.00 - 0.05 10*3/mm3 Final   • nRBC 03/29/2019 0.0  0.0 - 0.0 /100 WBC Final         ASSESSMENT     Problem List Items Addressed This Visit     Heart disease    Hyperlipidemia    Essential hypertension - Primary    Prediabetes    Obesity (BMI 30-39.9)      Other Visit Diagnoses     Elevated fasting glucose        Tick bite, initial encounter              PLAN    1. HLD- well controlled  2. HTN- well controlled   3, prediabetes- A1c is 5.99, fasting glucose is 108, discussed dietary changes and weight loss  4. Tick bite - start doxycycline  Follow up in 6 months . I would like have him do a CPE at that time with an ekg and labs (including an a1c and PSA)

## 2019-05-06 ENCOUNTER — TELEPHONE (OUTPATIENT)
Dept: FAMILY MEDICINE CLINIC | Facility: CLINIC | Age: 64
End: 2019-05-06

## 2019-05-06 ENCOUNTER — OFFICE VISIT (OUTPATIENT)
Dept: FAMILY MEDICINE CLINIC | Facility: CLINIC | Age: 64
End: 2019-05-06

## 2019-05-06 VITALS
SYSTOLIC BLOOD PRESSURE: 138 MMHG | TEMPERATURE: 98.6 F | DIASTOLIC BLOOD PRESSURE: 72 MMHG | HEART RATE: 76 BPM | WEIGHT: 205 LBS | OXYGEN SATURATION: 92 % | BODY MASS INDEX: 30.36 KG/M2 | HEIGHT: 69 IN

## 2019-05-06 DIAGNOSIS — R05.9 COUGH: Primary | ICD-10-CM

## 2019-05-06 DIAGNOSIS — J06.9 ACUTE URI: ICD-10-CM

## 2019-05-06 DIAGNOSIS — T14.8XXA ABRASION: ICD-10-CM

## 2019-05-06 LAB
EXPIRATION DATE: NORMAL
FLUAV AG NPH QL: NEGATIVE
FLUBV AG NPH QL: NEGATIVE
INTERNAL CONTROL: NORMAL
Lab: NORMAL

## 2019-05-06 PROCEDURE — 99214 OFFICE O/P EST MOD 30 MIN: CPT | Performed by: NURSE PRACTITIONER

## 2019-05-06 PROCEDURE — 85025 COMPLETE CBC W/AUTO DIFF WBC: CPT | Performed by: NURSE PRACTITIONER

## 2019-05-06 PROCEDURE — 87804 INFLUENZA ASSAY W/OPTIC: CPT | Performed by: NURSE PRACTITIONER

## 2019-05-06 RX ORDER — MUPIROCIN CALCIUM 20 MG/G
CREAM TOPICAL 2 TIMES DAILY
Qty: 15 G | Refills: 1 | Status: SHIPPED | OUTPATIENT
Start: 2019-05-06 | End: 2019-05-22

## 2019-05-06 RX ORDER — SIMVASTATIN 40 MG
TABLET ORAL
Refills: 1 | COMMUNITY
Start: 2019-04-11 | End: 2019-07-10

## 2019-05-06 NOTE — TELEPHONE ENCOUNTER
PT'S WIFE CALLED IN AT 04:25PM AND STATES PT IS NOT COHERENT. PT STATES SAW KEKE TODAY, WHERE SHE ADVISED PT HAD A COLD. [facility] WAS ALREADY GONE. PER DR. DAVIS HE HAS ADVISED PT GO TO ER. PT WIFE WAS ADVISED OF SUCH. SHE IS GOING.

## 2019-05-06 NOTE — PROGRESS NOTES
Subjective   Elijah Montero is a 63 y.o. male.   Chief Complaint   Patient presents with   • Sore Throat   • Cough   • Fatigue     Vitals:    05/06/19 1342   BP: 138/72   Pulse: 76   Temp: 98.6 °F (37 °C)   SpO2: 92%     No LMP for male patient.    Elijah is here for an acute visit He c/o URI.   He scraped his elbow playing softball and has an abrasion to the area. He states he has a history of staph. He denies drainage or pain.       URI    This is a new problem. Episode onset: 5 days  The problem has been unchanged. There has been no fever. Associated symptoms include congestion, coughing, nausea, rhinorrhea and a sore throat. Pertinent negatives include no chest pain, diarrhea or vomiting. He has tried nothing for the symptoms. The treatment provided no relief.        The following portions of the patient's history were reviewed and updated as appropriate: allergies, current medications, past family history, past medical history, past social history, past surgical history and problem list.    Review of Systems   Constitutional: Positive for chills and fatigue. Negative for fever.   HENT: Positive for congestion, rhinorrhea and sore throat.    Respiratory: Positive for cough.    Cardiovascular: Negative for chest pain, palpitations and leg swelling.   Gastrointestinal: Positive for nausea. Negative for diarrhea and vomiting.   Musculoskeletal: Positive for arthralgias.   Skin: Positive for wound.       Objective   Physical Exam   Constitutional: Vital signs are normal. He appears well-developed and well-nourished. He appears ill. No distress.   HENT:   Right Ear: Tympanic membrane and ear canal normal.   Left Ear: Tympanic membrane and ear canal normal.   Nose: Mucosal edema and rhinorrhea present.   Mouth/Throat: Uvula is midline. Posterior oropharyngeal erythema present.   Cardiovascular: Normal rate, regular rhythm and normal heart sounds.   Pulmonary/Chest: Effort normal and breath sounds normal.   Neurological:  He is alert.   Skin: Skin is warm and dry. Abrasion (to left elbow . There is no  drainage or surrounding erythema or edema ) noted.       Assessment/Plan   Elijah was seen today for sore throat, cough and fatigue.    Diagnoses and all orders for this visit:    Cough  -     POC CBC With / Auto Diff  -     POC Influenza A / B    Acute URI    Abrasion    Other orders  -     mupirocin (BACTROBAN) 2 % cream; Apply  topically to the appropriate area as directed 2 (Two) Times a Day.      1. Flu swab negative  2. Cbc normal  3. Abrasion - keep the area clean and dry , clean with peroxide twice daily and apply bactroban for 7-10 days  4. URI  Symptom treatment for 7-10 days  Rest and fluids  Tylenol or motrin   Avoid second hand smoke and allergens   Throat lozenges, humidifier, vicks vapor rub as needed  Follow up if your symptoms persist past 7-10 days or sooner if your symptoms worsen or if you develop new symptoms

## 2019-05-07 ENCOUNTER — TELEPHONE (OUTPATIENT)
Dept: FAMILY MEDICINE CLINIC | Facility: CLINIC | Age: 64
End: 2019-05-07

## 2019-05-07 RX ORDER — AZITHROMYCIN 250 MG/1
TABLET, FILM COATED ORAL
Qty: 6 TABLET | Refills: 0 | Status: SHIPPED | OUTPATIENT
Start: 2019-05-07 | End: 2019-05-22

## 2019-05-07 NOTE — TELEPHONE ENCOUNTER
I called the patient. He passed out yesterday when he got home . His wife did not take him to the emergency room as advised. He felt like he has had a fever but has not checked his temperature. His sore throat is worse and is is coughing up yellow phlegm. He c/o nausea but is still drinking plenty of fluids. He denies CP or shortness of breath  Will call in zithromax , push fluids, alternate tylenol and motrin, I asked him to follow up in 2 days for a recheck   He was advised to go to the ER if he develops a high fever, chest pain, or shortness of breath or if he passes out again           ----- Message from Madeline Stern MA sent at 5/7/2019  8:45 AM EDT -----      ----- Message -----  From: Sobia Salinas  Sent: 5/7/2019   7:57 AM  To: Madeline Stern MA        ----- Message -----  From: Sobia Salinas  Sent: 5/6/2019   4:26 PM  To: Leticia Manriquez    PT'S WIFE CALLED AND IS UPSET BECAUSE PT LEFT HERE DIAGNOSED WITH A COLD AND IS NOW INCOHERENT BREATHING BADLY AND SHE HAS BEEN ADVISED TO TAKE HIM TO THE ER. SHE COULDN'T WAIT TO GIVE ME HER PHONE #

## 2019-05-08 ENCOUNTER — TELEPHONE (OUTPATIENT)
Dept: FAMILY MEDICINE CLINIC | Facility: CLINIC | Age: 64
End: 2019-05-08

## 2019-05-08 NOTE — TELEPHONE ENCOUNTER
I called and checked on Elijah to see how he is feeling.   He is feeling much better today   He will follow up as needed

## 2019-05-22 ENCOUNTER — OFFICE VISIT (OUTPATIENT)
Dept: CARDIOLOGY | Age: 64
End: 2019-05-22

## 2019-05-22 VITALS
HEART RATE: 70 BPM | WEIGHT: 201 LBS | BODY MASS INDEX: 29.77 KG/M2 | SYSTOLIC BLOOD PRESSURE: 157 MMHG | HEIGHT: 69 IN | DIASTOLIC BLOOD PRESSURE: 84 MMHG

## 2019-05-22 DIAGNOSIS — I25.118 CORONARY ARTERY DISEASE OF NATIVE ARTERY OF NATIVE HEART WITH STABLE ANGINA PECTORIS (HCC): ICD-10-CM

## 2019-05-22 DIAGNOSIS — R07.2 PRECORDIAL PAIN: Primary | ICD-10-CM

## 2019-05-22 DIAGNOSIS — I10 ESSENTIAL HYPERTENSION: ICD-10-CM

## 2019-05-22 DIAGNOSIS — E78.5 HYPERLIPIDEMIA, UNSPECIFIED HYPERLIPIDEMIA TYPE: ICD-10-CM

## 2019-05-22 PROCEDURE — 99204 OFFICE O/P NEW MOD 45 MIN: CPT | Performed by: INTERNAL MEDICINE

## 2019-05-22 RX ORDER — METOPROLOL SUCCINATE 25 MG/1
25 TABLET, EXTENDED RELEASE ORAL DAILY
Qty: 30 TABLET | Refills: 11 | Status: SHIPPED | OUTPATIENT
Start: 2019-05-22 | End: 2019-07-16 | Stop reason: HOSPADM

## 2019-05-22 RX ORDER — NITROGLYCERIN 0.4 MG/1
TABLET SUBLINGUAL
Qty: 100 TABLET | Refills: 11 | Status: SHIPPED | OUTPATIENT
Start: 2019-05-22 | End: 2022-06-13 | Stop reason: SDUPTHER

## 2019-05-22 NOTE — PROGRESS NOTES
Subjective:        [facility]  Cardiology Consult Note    Patient Identification:  Name: Elijah Montero  Age: 63 y.o.  Sex: male  :  1955  MRN: [id number]             CC  SEEN DR BORGES 4-5 YRS AGO  3 WKS AGO HAD ANGINA DURING LAWN MOVING, MULTIPLE TIMES    STENT 21 YR AGO    History of Present Illness:   63-year-old male known history of the coronary artery disease with angioplasty and stent at the age of 41, here for the cardiac evaluation as well as establishment of the care, last seen by the cardiologist was 4 to 5 years ago has been complaining of the chest tightness mild to moderate in intensity, along with the shortness of breath usually with exertion relieved with rest over the past several weeks, gradually worsening    Comorbid cardiac risk factors:     Past Medical History:  Past Medical History:   Diagnosis Date   • Cervical disc disease    • Chest pain     possibly due to pericarditis   • Coronary artery disease     mild to moderate   • Heart disease    • History of positive PPD     as a child    • Hyperlipidemia    • Hypertension      Past Surgical History:  Past Surgical History:   Procedure Laterality Date   • CARDIAC CATHETERIZATION      angioplasty with stent    • CARDIAC CATHETERIZATION  2015   • CORONARY STENT PLACEMENT     • SKIN CANCER EXCISION N/A    • TONSILLECTOMY        Allergies:  No Known Allergies  Home Meds:    (Not in a hospital admission)  Current Meds:   [unfilled]  Social History:   Social History     Tobacco Use   • Smoking status: Never Smoker   • Smokeless tobacco: Never Used   Substance Use Topics   • Alcohol use: Yes     Comment: 4/week       Family History:  Family History   Problem Relation Age of Onset   • Other Mother 65        cabg   • Heart disease Father    • Other Father 45        cabg   • Other Sister         BLOOD DISEASE         Review of Systems    Constitutional: No weakness,fatigue, fever, rigors, chills   Eyes: No vision  changes, eye pain   ENT/oropharynx: No difficulty swallowing, sore throat, epistaxis, changes in hearing   Cardiovascular:  No chest pain   Respiratory: No shortness of breath, dyspnea on exertion, cough, wheezing hemoptysis   Gastrointestinal: No abdominal pain, nausea, vomiting, diarrhea, bloody stools   Genitourinary: No hematuria, dysuria   Neurological: No headache, tremors, numbness,  one-sided weakness    Musculoskeletal: No cramps, myalgias,  joint pain, joint swelling   Integument: No rash, edema           Constitutional:  Heart Rate:  [70] 70  BP: (157)/(84) 157/84    Physical Exam   General:  Appears in no acute distress  Eyes: PERTL,  HEENT:  No JVD. Thyroid not visibly enlarged. No mucosal pallor or cyanosis  Respiratory: Respirations regular and unlabored at rest. BBS with good air entry in all fields. No crackles, rubs or wheezes auscultated  Cardiovascular: S1S2 Regular rate and rhythm. No murmur, rub or gallop auscultated. No carotid bruits. DP/PT pulses    . No pretibial pitting edema  Gastrointestinal: Abdomen soft, flat, non tender. Bowel sounds present. No hepatosplenomegaly. No ascites  Musculoskeletal: GARSIA x4. No abnormal movements  Extremities: No digital clubbing or cyanosis  Skin: Color pink. Skin warm and dry to touch. No rashes  No xanthoma  Neuro: AAO x3 CN II-XII grossly intact    5/15  SUMMARY:  1.  Moderate coronary artery disease in the mid-LAD, middiagonal, and distal  RCA.   2.  Moderate disease in the distal LAD.   3.  Mild disease in the ostium of the LAD and circumflex.   4.  Moderate disease in the proximal RCA.   5.  Normal left ventricular systolic function    Procedures        Cardiographics  ECG:     Telemetry:    Echocardiogram:     Imaging  Chest X-ray:     Lab Review               @[email or web address]@              Assessment:/ Recommendations / Plan:   Patient Active Problem List   Diagnosis   • Heart disease   • Hyperlipidemia   • Essential hypertension   • History of  positive PPD   • Prediabetes   • Obesity (BMI 30-39.9)                    ICD-10-CM ICD-9-CM   1. Precordial pain R07.2 786.51   2. Coronary artery disease of native artery of native heart with stable angina pectoris (CMS/HCC) I25.118 414.01     413.9   3. Hyperlipidemia, unspecified hyperlipidemia type E78.5 272.4   4. Essential hypertension I10 401.9     1. Precordial pain  We will proceed with a stress test    Considering the patient's symptoms as well as clinical situation and  EKG findings, along with cardiac risk factors, ischemic workup is necessary to rule out ischemic cardiomyopathy, stress induced arrhythmias, and functional capacity for diagnosis as well as prognostic consideration      2. Coronary artery disease of native artery of native heart with stable angina pectoris (CMS/HCC)  Having significant chest pains and tightness no chest    3. Hyperlipidemia, unspecified hyperlipidemia type  Continue current medications    4. Essential hypertension  Continue current medications and add beta-blockers as the blood pressure still remains borderline high       STRESS TEST, ECHO    SEE IN 2-3 WKS    TOPROL  XL 25 MG    Pros and cons of this new medication / change medication has been explained to  the patient    Possible side effects has been explained    Associated need of the blood  Work has been explained    Need for the compliance of the medication has been explained      SEE IN 1 MONTH    Labs/tests ordered for am      Guera Perry MD  5/22/2019, 3:21 PM      EMR Dragon/Transcription:   Dictated utilizing Dragon dictation

## 2019-05-23 PROBLEM — R07.2 PRECORDIAL PAIN: Status: ACTIVE | Noted: 2019-05-23

## 2019-05-23 PROBLEM — I25.118 CORONARY ARTERY DISEASE OF NATIVE ARTERY OF NATIVE HEART WITH STABLE ANGINA PECTORIS: Status: ACTIVE | Noted: 2019-05-23

## 2019-06-13 ENCOUNTER — HOSPITAL ENCOUNTER (OUTPATIENT)
Dept: CARDIOLOGY | Facility: HOSPITAL | Age: 64
Discharge: HOME | End: 2019-06-13

## 2019-06-13 ENCOUNTER — HOSPITAL ENCOUNTER (OUTPATIENT)
Dept: CARDIOLOGY | Facility: HOSPITAL | Age: 64
Discharge: HOME | End: 2019-06-13
Admitting: INTERNAL MEDICINE

## 2019-06-13 VITALS
BODY MASS INDEX: 29.77 KG/M2 | DIASTOLIC BLOOD PRESSURE: 60 MMHG | SYSTOLIC BLOOD PRESSURE: 114 MMHG | WEIGHT: 201 LBS | RESPIRATION RATE: 16 BRPM | OXYGEN SATURATION: 98 % | HEART RATE: 68 BPM | HEIGHT: 69 IN

## 2019-06-13 VITALS
DIASTOLIC BLOOD PRESSURE: 79 MMHG | SYSTOLIC BLOOD PRESSURE: 129 MMHG | WEIGHT: 201 LBS | BODY MASS INDEX: 29.77 KG/M2 | HEIGHT: 69 IN | HEART RATE: 64 BPM

## 2019-06-13 LAB
BH CV STRESS BP STAGE 1: NORMAL
BH CV STRESS BP STAGE 2: NORMAL
BH CV STRESS BP STAGE 3: NORMAL
BH CV STRESS BP STAGE 4: NORMAL
BH CV STRESS DURATION MIN STAGE 1: 3
BH CV STRESS DURATION MIN STAGE 2: 3
BH CV STRESS DURATION MIN STAGE 3: 3
BH CV STRESS DURATION MIN STAGE 4: 1
BH CV STRESS DURATION SEC STAGE 1: 0
BH CV STRESS DURATION SEC STAGE 2: 0
BH CV STRESS DURATION SEC STAGE 3: 0
BH CV STRESS DURATION SEC STAGE 4: 35
BH CV STRESS GRADE STAGE 1: 10
BH CV STRESS GRADE STAGE 2: 12
BH CV STRESS GRADE STAGE 3: 14
BH CV STRESS GRADE STAGE 4: 16
BH CV STRESS HR STAGE 1: 86
BH CV STRESS HR STAGE 2: 97
BH CV STRESS HR STAGE 3: 111
BH CV STRESS HR STAGE 4: 121
BH CV STRESS METS STAGE 1: 4.6
BH CV STRESS METS STAGE 2: 7
BH CV STRESS METS STAGE 3: 10.1
BH CV STRESS METS STAGE 4: 12.6
BH CV STRESS PROTOCOL 1: NORMAL
BH CV STRESS PROTOCOL 2 BP STAGE 1: NORMAL
BH CV STRESS PROTOCOL 2 COMMENTS STAGE 1: NORMAL
BH CV STRESS PROTOCOL 2 DOSE REGADENOSON STAGE 1: 0.4
BH CV STRESS PROTOCOL 2 DURATION MIN STAGE 1: 1
BH CV STRESS PROTOCOL 2 DURATION SEC STAGE 1: 46
BH CV STRESS PROTOCOL 2 HR STAGE 1: 113
BH CV STRESS PROTOCOL 2 STAGE 1: 1
BH CV STRESS PROTOCOL 2: NORMAL
BH CV STRESS RECOVERY BP: NORMAL MMHG
BH CV STRESS RECOVERY HR: 81 BPM
BH CV STRESS RECOVERY O2: 98 %
BH CV STRESS SPEED STAGE 1: 1.7
BH CV STRESS SPEED STAGE 2: 2.5
BH CV STRESS SPEED STAGE 3: 3.4
BH CV STRESS SPEED STAGE 4: 4.2
BH CV STRESS STAGE 1: 1
BH CV STRESS STAGE 2: 2
BH CV STRESS STAGE 3: 3
BH CV STRESS STAGE 4: 4
LV EF NUC BP: 60 %
MAXIMAL PREDICTED HEART RATE: 159 BPM
PERCENT MAX PREDICTED HR: 71.07 %
STRESS BASELINE BP: NORMAL MMHG
STRESS BASELINE HR: 67 BPM
STRESS O2 SAT REST: 98 %
STRESS PERCENT HR: 84 %
STRESS POST ESTIMATED WORKLOAD: 12.6 METS
STRESS POST EXERCISE DUR MIN: 12 MIN
STRESS POST EXERCISE DUR SEC: 21 SEC
STRESS POST PEAK BP: NORMAL MMHG
STRESS POST PEAK HR: 113 BPM
STRESS TARGET HR: 135 BPM

## 2019-06-13 PROCEDURE — 78452 HT MUSCLE IMAGE SPECT MULT: CPT | Performed by: INTERNAL MEDICINE

## 2019-06-13 PROCEDURE — 25010000002 REGADENOSON 0.4 MG/5ML SOLUTION: Performed by: INTERNAL MEDICINE

## 2019-06-13 PROCEDURE — A9500 TC99M SESTAMIBI: HCPCS | Performed by: INTERNAL MEDICINE

## 2019-06-13 PROCEDURE — 78452 HT MUSCLE IMAGE SPECT MULT: CPT

## 2019-06-13 PROCEDURE — 93306 TTE W/DOPPLER COMPLETE: CPT

## 2019-06-13 PROCEDURE — 93016 CV STRESS TEST SUPVJ ONLY: CPT | Performed by: INTERNAL MEDICINE

## 2019-06-13 PROCEDURE — 93306 TTE W/DOPPLER COMPLETE: CPT | Performed by: INTERNAL MEDICINE

## 2019-06-13 PROCEDURE — 0 TECHNETIUM SESTAMIBI: Performed by: INTERNAL MEDICINE

## 2019-06-13 PROCEDURE — 93018 CV STRESS TEST I&R ONLY: CPT | Performed by: INTERNAL MEDICINE

## 2019-06-13 PROCEDURE — 93017 CV STRESS TEST TRACING ONLY: CPT

## 2019-06-13 RX ADMIN — TECHNETIUM TC 99M SESTAMIBI 1 DOSE: 1 INJECTION INTRAVENOUS at 07:05

## 2019-06-13 RX ADMIN — REGADENOSON 0.4 MG: 0.08 INJECTION, SOLUTION INTRAVENOUS at 09:35

## 2019-06-13 RX ADMIN — TECHNETIUM TC 99M SESTAMIBI 1 DOSE: 1 INJECTION INTRAVENOUS at 09:35

## 2019-06-14 LAB
BH CV ECHO MEAS - ACS: 1.8 CM
BH CV ECHO MEAS - AO MAX PG (FULL): 9.5 MMHG
BH CV ECHO MEAS - AO MAX PG: 13.1 MMHG
BH CV ECHO MEAS - AO MEAN PG (FULL): 5 MMHG
BH CV ECHO MEAS - AO MEAN PG: 7 MMHG
BH CV ECHO MEAS - AO ROOT AREA (BSA CORRECTED): 1.3
BH CV ECHO MEAS - AO ROOT AREA: 5.3 CM^2
BH CV ECHO MEAS - AO ROOT DIAM: 2.6 CM
BH CV ECHO MEAS - AO V2 MAX: 181 CM/SEC
BH CV ECHO MEAS - AO V2 MEAN: 128 CM/SEC
BH CV ECHO MEAS - AO V2 VTI: 41.5 CM
BH CV ECHO MEAS - ASC AORTA: 2.6 CM
BH CV ECHO MEAS - AVA(I,A): 1.7 CM^2
BH CV ECHO MEAS - AVA(I,D): 1.7 CM^2
BH CV ECHO MEAS - AVA(V,A): 1.7 CM^2
BH CV ECHO MEAS - AVA(V,D): 1.7 CM^2
BH CV ECHO MEAS - BSA(HAYCOCK): 2.1 M^2
BH CV ECHO MEAS - BSA: 2.1 M^2
BH CV ECHO MEAS - BZI_BMI: 29.7 KILOGRAMS/M^2
BH CV ECHO MEAS - BZI_METRIC_HEIGHT: 175.3 CM
BH CV ECHO MEAS - BZI_METRIC_WEIGHT: 91.2 KG
BH CV ECHO MEAS - EDV(CUBED): 54.9 ML
BH CV ECHO MEAS - EDV(MOD-SP2): 79 ML
BH CV ECHO MEAS - EDV(MOD-SP4): 100 ML
BH CV ECHO MEAS - EDV(TEICH): 62 ML
BH CV ECHO MEAS - EF(CUBED): 60 %
BH CV ECHO MEAS - EF(MOD-BP): 60 %
BH CV ECHO MEAS - EF(MOD-SP2): 60.8 %
BH CV ECHO MEAS - EF(MOD-SP4): 64 %
BH CV ECHO MEAS - EF(TEICH): 52.3 %
BH CV ECHO MEAS - ESV(CUBED): 22 ML
BH CV ECHO MEAS - ESV(MOD-SP2): 31 ML
BH CV ECHO MEAS - ESV(MOD-SP4): 36 ML
BH CV ECHO MEAS - ESV(TEICH): 29.6 ML
BH CV ECHO MEAS - FS: 26.3 %
BH CV ECHO MEAS - IVS/LVPW: 1.2
BH CV ECHO MEAS - IVSD: 1.3 CM
BH CV ECHO MEAS - LAT PEAK E' VEL: 11.7 CM/SEC
BH CV ECHO MEAS - LV DIASTOLIC VOL/BSA (35-75): 48.3 ML/M^2
BH CV ECHO MEAS - LV MASS(C)D: 153.2 GRAMS
BH CV ECHO MEAS - LV MASS(C)DI: 74 GRAMS/M^2
BH CV ECHO MEAS - LV MAX PG: 3.6 MMHG
BH CV ECHO MEAS - LV MEAN PG: 2 MMHG
BH CV ECHO MEAS - LV SYSTOLIC VOL/BSA (12-30): 17.4 ML/M^2
BH CV ECHO MEAS - LV V1 MAX: 95.3 CM/SEC
BH CV ECHO MEAS - LV V1 MEAN: 67.8 CM/SEC
BH CV ECHO MEAS - LV V1 VTI: 21.8 CM
BH CV ECHO MEAS - LVIDD: 3.8 CM
BH CV ECHO MEAS - LVIDS: 2.8 CM
BH CV ECHO MEAS - LVLD AP2: 7.6 CM
BH CV ECHO MEAS - LVLD AP4: 7.4 CM
BH CV ECHO MEAS - LVLS AP2: 6.6 CM
BH CV ECHO MEAS - LVLS AP4: 5.5 CM
BH CV ECHO MEAS - LVOT AREA (M): 3.1 CM^2
BH CV ECHO MEAS - LVOT AREA: 3.1 CM^2
BH CV ECHO MEAS - LVOT DIAM: 2 CM
BH CV ECHO MEAS - LVPWD: 1.1 CM
BH CV ECHO MEAS - MED PEAK E' VEL: 6.9 CM/SEC
BH CV ECHO MEAS - MV A DUR: 0.22 SEC
BH CV ECHO MEAS - MV A MAX VEL: 75.7 CM/SEC
BH CV ECHO MEAS - MV DEC SLOPE: 142 CM/SEC^2
BH CV ECHO MEAS - MV DEC TIME: 0.26 SEC
BH CV ECHO MEAS - MV E MAX VEL: 64 CM/SEC
BH CV ECHO MEAS - MV E/A: 0.85
BH CV ECHO MEAS - MV MAX PG: 2.7 MMHG
BH CV ECHO MEAS - MV MEAN PG: 1 MMHG
BH CV ECHO MEAS - MV P1/2T MAX VEL: 60.1 CM/SEC
BH CV ECHO MEAS - MV P1/2T: 124 MSEC
BH CV ECHO MEAS - MV V2 MAX: 81.9 CM/SEC
BH CV ECHO MEAS - MV V2 MEAN: 47.6 CM/SEC
BH CV ECHO MEAS - MV V2 VTI: 24.1 CM
BH CV ECHO MEAS - MVA P1/2T LCG: 3.7 CM^2
BH CV ECHO MEAS - MVA(P1/2T): 1.8 CM^2
BH CV ECHO MEAS - MVA(VTI): 2.8 CM^2
BH CV ECHO MEAS - PA ACC TIME: 0.09 SEC
BH CV ECHO MEAS - PA MAX PG (FULL): 3.1 MMHG
BH CV ECHO MEAS - PA MAX PG: 4.5 MMHG
BH CV ECHO MEAS - PA PR(ACCEL): 39.9 MMHG
BH CV ECHO MEAS - PA V2 MAX: 106 CM/SEC
BH CV ECHO MEAS - PULM A REVS DUR: 0.16 SEC
BH CV ECHO MEAS - PULM A REVS VEL: 30.4 CM/SEC
BH CV ECHO MEAS - PULM DIAS VEL: 31.8 CM/SEC
BH CV ECHO MEAS - PULM S/D: 1.5
BH CV ECHO MEAS - PULM SYS VEL: 47.7 CM/SEC
BH CV ECHO MEAS - PVA(V,A): 1.8 CM^2
BH CV ECHO MEAS - PVA(V,D): 1.8 CM^2
BH CV ECHO MEAS - QP/QS: 0.66
BH CV ECHO MEAS - RAP SYSTOLE: 3 MMHG
BH CV ECHO MEAS - RV MAX PG: 1.4 MMHG
BH CV ECHO MEAS - RV MEAN PG: 1 MMHG
BH CV ECHO MEAS - RV V1 MAX: 59.1 CM/SEC
BH CV ECHO MEAS - RV V1 MEAN: 41.9 CM/SEC
BH CV ECHO MEAS - RV V1 VTI: 14.3 CM
BH CV ECHO MEAS - RVOT AREA: 3.1 CM^2
BH CV ECHO MEAS - RVOT DIAM: 2 CM
BH CV ECHO MEAS - RVSP: 21 MMHG
BH CV ECHO MEAS - SI(AO): 106.4 ML/M^2
BH CV ECHO MEAS - SI(CUBED): 15.9 ML/M^2
BH CV ECHO MEAS - SI(LVOT): 33.1 ML/M^2
BH CV ECHO MEAS - SI(MOD-SP2): 23.2 ML/M^2
BH CV ECHO MEAS - SI(MOD-SP4): 30.9 ML/M^2
BH CV ECHO MEAS - SI(TEICH): 15.7 ML/M^2
BH CV ECHO MEAS - SV(AO): 220.3 ML
BH CV ECHO MEAS - SV(CUBED): 32.9 ML
BH CV ECHO MEAS - SV(LVOT): 68.5 ML
BH CV ECHO MEAS - SV(MOD-SP2): 48 ML
BH CV ECHO MEAS - SV(MOD-SP4): 64 ML
BH CV ECHO MEAS - SV(RVOT): 44.9 ML
BH CV ECHO MEAS - SV(TEICH): 32.4 ML
BH CV ECHO MEAS - TAPSE (>1.6): 1.8 CM2
BH CV ECHO MEAS - TR MAX VEL: 212 CM/SEC
BH CV ECHO MEASUREMENTS AVERAGE E/E' RATIO: 6.88
BH CV XLRA - RV BASE: 3.4 CM
BH CV XLRA - RV LENGTH: 6.7 CM
BH CV XLRA - RV MID: 3 CM
BH CV XLRA - TDI S': 10.7 CM/SEC
LEFT ATRIUM VOLUME INDEX: 24 ML/M2
MAXIMAL PREDICTED HEART RATE: 159 BPM
STRESS TARGET HR: 135 BPM

## 2019-06-24 ENCOUNTER — TELEPHONE (OUTPATIENT)
Dept: FAMILY MEDICINE CLINIC | Facility: CLINIC | Age: 64
End: 2019-06-24

## 2019-06-24 NOTE — TELEPHONE ENCOUNTER
It looks like the cardiologist sent this to his pharmacy on 5/22  He just needs to pick it up or have the pharmacy fill it   ----- Message from Madeline Stern MA sent at 6/24/2019 12:42 PM EDT -----  Is this ok to refill  ----- Message -----  From: Jacqueline Sierra  Sent: 6/24/2019  12:19 PM  To: Madeline Stern MA    NEEDS A RX FOR      NITROGLYCERIN .04MG   UP TO 3 TIMES A DAY       PT STATES HIS CARDIOLOGIST GAVE THIS TO HIM BUT THAT [facility] IS TAKING OVER THE MEDICATION   HE SAYS SHE WILL KNOW ABOUT THIS BC THEY DISCUSSED IT     [facility] IN [place]     PLEASE CALL PT WITH ANY QUESTIONS   [phone]

## 2019-06-26 ENCOUNTER — OFFICE VISIT (OUTPATIENT)
Dept: CARDIOLOGY | Age: 64
End: 2019-06-26

## 2019-06-26 VITALS
DIASTOLIC BLOOD PRESSURE: 78 MMHG | HEART RATE: 73 BPM | BODY MASS INDEX: 30.51 KG/M2 | HEIGHT: 69 IN | WEIGHT: 206 LBS | SYSTOLIC BLOOD PRESSURE: 131 MMHG

## 2019-06-26 DIAGNOSIS — I25.118 CORONARY ARTERY DISEASE OF NATIVE ARTERY OF NATIVE HEART WITH STABLE ANGINA PECTORIS (HCC): ICD-10-CM

## 2019-06-26 DIAGNOSIS — R94.30 ABNORMAL CARDIAC FUNCTION TEST: Primary | ICD-10-CM

## 2019-06-26 DIAGNOSIS — R07.2 PRECORDIAL PAIN: ICD-10-CM

## 2019-06-26 PROCEDURE — 99214 OFFICE O/P EST MOD 30 MIN: CPT | Performed by: INTERNAL MEDICINE

## 2019-07-01 ENCOUNTER — APPOINTMENT (OUTPATIENT)
Dept: LAB | Facility: HOSPITAL | Age: 64
End: 2019-07-01

## 2019-07-01 LAB
ANION GAP SERPL CALCULATED.3IONS-SCNC: 11 MMOL/L (ref 5–15)
APTT PPP: 26.8 SECONDS (ref 22.7–35.4)
BASOPHILS # BLD AUTO: 0.05 10*3/MM3 (ref 0–0.2)
BASOPHILS NFR BLD AUTO: 0.8 % (ref 0–1.5)
BUN BLD-MCNC: 12 MG/DL (ref 8–23)
BUN/CREAT SERPL: 12.8 (ref 7–25)
CALCIUM SPEC-SCNC: 9.6 MG/DL (ref 8.6–10.5)
CHLORIDE SERPL-SCNC: 101 MMOL/L (ref 98–107)
CO2 SERPL-SCNC: 27 MMOL/L (ref 22–29)
CREAT BLD-MCNC: 0.94 MG/DL (ref 0.76–1.27)
DEPRECATED RDW RBC AUTO: 45.9 FL (ref 37–54)
EOSINOPHIL # BLD AUTO: 0.15 10*3/MM3 (ref 0–0.4)
EOSINOPHIL NFR BLD AUTO: 2.3 % (ref 0.3–6.2)
ERYTHROCYTE [DISTWIDTH] IN BLOOD BY AUTOMATED COUNT: 12.7 % (ref 12.3–15.4)
GFR SERPL CREATININE-BSD FRML MDRD: 82 ML/MIN/1.73
GLUCOSE BLD-MCNC: 111 MG/DL (ref 65–99)
HCT VFR BLD AUTO: 49.3 % (ref 37.5–51)
HGB BLD-MCNC: 15.7 G/DL (ref 13–17.7)
IMM GRANULOCYTES # BLD AUTO: 0.03 10*3/MM3 (ref 0–0.05)
IMM GRANULOCYTES NFR BLD AUTO: 0.5 % (ref 0–0.5)
INR PPP: 0.97 (ref 0.9–1.1)
LYMPHOCYTES # BLD AUTO: 1.86 10*3/MM3 (ref 0.7–3.1)
LYMPHOCYTES NFR BLD AUTO: 28 % (ref 19.6–45.3)
MCH RBC QN AUTO: 31.4 PG (ref 26.6–33)
MCHC RBC AUTO-ENTMCNC: 31.8 G/DL (ref 31.5–35.7)
MCV RBC AUTO: 98.6 FL (ref 79–97)
MONOCYTES # BLD AUTO: 0.81 10*3/MM3 (ref 0.1–0.9)
MONOCYTES NFR BLD AUTO: 12.2 % (ref 5–12)
NEUTROPHILS # BLD AUTO: 3.74 10*3/MM3 (ref 1.7–7)
NEUTROPHILS NFR BLD AUTO: 56.2 % (ref 42.7–76)
NRBC BLD AUTO-RTO: 0 /100 WBC (ref 0–0.2)
PLATELET # BLD AUTO: 187 10*3/MM3 (ref 140–450)
PMV BLD AUTO: 10.2 FL (ref 6–12)
POTASSIUM BLD-SCNC: 4.8 MMOL/L (ref 3.5–5.2)
PROTHROMBIN TIME: 12.6 SECONDS (ref 11.7–14.2)
RBC # BLD AUTO: 5 10*6/MM3 (ref 4.14–5.8)
SODIUM BLD-SCNC: 139 MMOL/L (ref 136–145)
WBC NRBC COR # BLD: 6.64 10*3/MM3 (ref 3.4–10.8)

## 2019-07-01 PROCEDURE — 85025 COMPLETE CBC W/AUTO DIFF WBC: CPT | Performed by: INTERNAL MEDICINE

## 2019-07-01 PROCEDURE — 80048 BASIC METABOLIC PNL TOTAL CA: CPT | Performed by: INTERNAL MEDICINE

## 2019-07-01 PROCEDURE — 85610 PROTHROMBIN TIME: CPT | Performed by: INTERNAL MEDICINE

## 2019-07-01 PROCEDURE — 85730 THROMBOPLASTIN TIME PARTIAL: CPT | Performed by: INTERNAL MEDICINE

## 2019-07-03 ENCOUNTER — HOSPITAL ENCOUNTER (OUTPATIENT)
Facility: HOSPITAL | Age: 64
Setting detail: HOSPITAL OUTPATIENT SURGERY
Discharge: HOME | End: 2019-07-03
Attending: INTERNAL MEDICINE | Admitting: INTERNAL MEDICINE

## 2019-07-03 ENCOUNTER — PREP FOR SURGERY (OUTPATIENT)
Dept: OTHER | Facility: HOSPITAL | Age: 64
End: 2019-07-03

## 2019-07-03 VITALS
RESPIRATION RATE: 16 BRPM | HEIGHT: 69 IN | SYSTOLIC BLOOD PRESSURE: 126 MMHG | WEIGHT: 200 LBS | BODY MASS INDEX: 29.62 KG/M2 | HEART RATE: 69 BPM | DIASTOLIC BLOOD PRESSURE: 73 MMHG | TEMPERATURE: 98.9 F | OXYGEN SATURATION: 96 %

## 2019-07-03 DIAGNOSIS — R94.30 ABNORMAL CARDIAC FUNCTION TEST: ICD-10-CM

## 2019-07-03 DIAGNOSIS — I25.10 CORONARY ARTERY DISEASE INVOLVING NATIVE CORONARY ARTERY, ANGINA PRESENCE UNSPECIFIED, UNSPECIFIED WHETHER NATIVE OR TRANSPLANTED HEART: Primary | ICD-10-CM

## 2019-07-03 PROCEDURE — 25010000002 MIDAZOLAM PER 1 MG: Performed by: INTERNAL MEDICINE

## 2019-07-03 PROCEDURE — S0260 H&P FOR SURGERY: HCPCS | Performed by: THORACIC SURGERY (CARDIOTHORACIC VASCULAR SURGERY)

## 2019-07-03 PROCEDURE — 93458 L HRT ARTERY/VENTRICLE ANGIO: CPT | Performed by: INTERNAL MEDICINE

## 2019-07-03 PROCEDURE — C1894 INTRO/SHEATH, NON-LASER: HCPCS | Performed by: INTERNAL MEDICINE

## 2019-07-03 PROCEDURE — C1769 GUIDE WIRE: HCPCS | Performed by: INTERNAL MEDICINE

## 2019-07-03 PROCEDURE — 25010000002 FENTANYL CITRATE (PF) 100 MCG/2ML SOLUTION: Performed by: INTERNAL MEDICINE

## 2019-07-03 PROCEDURE — 0 IOPAMIDOL PER 1 ML: Performed by: INTERNAL MEDICINE

## 2019-07-03 PROCEDURE — 25010000002 HEPARIN (PORCINE) PER 1000 UNITS: Performed by: INTERNAL MEDICINE

## 2019-07-03 RX ORDER — CHLORHEXIDINE GLUCONATE 500 MG/1
1 CLOTH TOPICAL EVERY 12 HOURS PRN
Status: CANCELLED | OUTPATIENT
Start: 2019-07-03

## 2019-07-03 RX ORDER — FENTANYL CITRATE 50 UG/ML
INJECTION, SOLUTION INTRAMUSCULAR; INTRAVENOUS AS NEEDED
Status: DISCONTINUED | OUTPATIENT
Start: 2019-07-03 | End: 2019-07-03 | Stop reason: HOSPADM

## 2019-07-03 RX ORDER — LIDOCAINE HYDROCHLORIDE 20 MG/ML
INJECTION, SOLUTION INFILTRATION; PERINEURAL AS NEEDED
Status: DISCONTINUED | OUTPATIENT
Start: 2019-07-03 | End: 2019-07-03 | Stop reason: HOSPADM

## 2019-07-03 RX ORDER — MIDAZOLAM HYDROCHLORIDE 1 MG/ML
INJECTION INTRAMUSCULAR; INTRAVENOUS AS NEEDED
Status: DISCONTINUED | OUTPATIENT
Start: 2019-07-03 | End: 2019-07-03 | Stop reason: HOSPADM

## 2019-07-03 RX ORDER — LIDOCAINE HYDROCHLORIDE 10 MG/ML
0.1 INJECTION, SOLUTION EPIDURAL; INFILTRATION; INTRACAUDAL; PERINEURAL ONCE AS NEEDED
Status: DISCONTINUED | OUTPATIENT
Start: 2019-07-03 | End: 2019-07-03 | Stop reason: HOSPADM

## 2019-07-03 RX ORDER — SODIUM CHLORIDE 0.9 % (FLUSH) 0.9 %
3-10 SYRINGE (ML) INJECTION AS NEEDED
Status: DISCONTINUED | OUTPATIENT
Start: 2019-07-03 | End: 2019-07-03 | Stop reason: HOSPADM

## 2019-07-03 RX ORDER — CEFAZOLIN SODIUM 2 G/100ML
2 INJECTION, SOLUTION INTRAVENOUS
Status: CANCELLED | OUTPATIENT
Start: 2019-07-11 | End: 2019-07-12

## 2019-07-03 RX ORDER — SODIUM CHLORIDE 0.9 % (FLUSH) 0.9 %
3 SYRINGE (ML) INJECTION EVERY 12 HOURS SCHEDULED
Status: DISCONTINUED | OUTPATIENT
Start: 2019-07-03 | End: 2019-07-03 | Stop reason: HOSPADM

## 2019-07-03 RX ORDER — CHLORHEXIDINE GLUCONATE 0.12 MG/ML
15 RINSE ORAL ONCE
Status: CANCELLED | OUTPATIENT
Start: 2019-07-11 | End: 2019-07-03

## 2019-07-03 RX ORDER — SACCHAROMYCES BOULARDII 250 MG
250 CAPSULE ORAL EVERY MORNING
COMMUNITY
End: 2020-01-08

## 2019-07-03 RX ORDER — SODIUM CHLORIDE 9 MG/ML
75 INJECTION, SOLUTION INTRAVENOUS CONTINUOUS
Status: DISCONTINUED | OUTPATIENT
Start: 2019-07-03 | End: 2019-07-03 | Stop reason: HOSPADM

## 2019-07-03 RX ORDER — CHLORHEXIDINE GLUCONATE 500 MG/1
1 CLOTH TOPICAL EVERY 12 HOURS PRN
Status: CANCELLED | OUTPATIENT
Start: 2019-07-11

## 2019-07-03 RX ORDER — CHLORHEXIDINE GLUCONATE 0.12 MG/ML
15 RINSE ORAL EVERY 12 HOURS
Status: CANCELLED | OUTPATIENT
Start: 2019-07-03 | End: 2019-07-04

## 2019-07-03 RX ORDER — SODIUM CHLORIDE 9 MG/ML
100 INJECTION, SOLUTION INTRAVENOUS CONTINUOUS
Status: DISCONTINUED | OUTPATIENT
Start: 2019-07-03 | End: 2019-07-03 | Stop reason: HOSPADM

## 2019-07-03 RX ADMIN — SODIUM CHLORIDE 75 ML/HR: 9 INJECTION, SOLUTION INTRAVENOUS at 08:09

## 2019-07-03 NOTE — NURSING NOTE
Vilma with cardiology called to let us know that the pt can wait here until [id number] to see surgeon or DC and make an appointment for the 9th. Pt and family decided to wait here to talk with surgeon.

## 2019-07-03 NOTE — H&P
63 M with multivessel CAD and recent abnormal stress test and left heart cath.    Stable angina. CCS Class II. Multivessel CAD involving LAD, diagonal, circumflex and RCA. Preserved LV EF.    No major medical comorbidities. Quit smoking many years ago and reports no pulmonary issues.    I have personally reviewed the left heart cath and echocardiogram.    His physical exam is grossly normal. No significant findings on focused cardiorespiratory exam.    His bloodwork is grossly normal.    He is of typical surgical risk as it relates to elective CABG.    I spent some time discussing the risk, benefits and alternatives to surgery with him and his family.    He requested to proceed with surgery.    Plan:    1) Planned CABG on 2019/7/11.  2) Standard preop testing pending.

## 2019-07-03 NOTE — DISCHARGE INSTRUCTIONS
[facility]  [address] [place] [address]    Coronary Angiogram (Radial/Ulnar Approach) After Care    Refer to this sheet in the next few weeks. These instructions provide you with information on caring for yourself after your procedure. Your caregiver may also give you more specific instructions. Your treatment has been planned according to current medical practices, but problems sometimes occur. Call your caregiver if you have any problems or questions after your procedure.    Home Care Instructions:  · You may shower the day after the procedure. Remove the bandage (dressing) and gently wash the site with plain soap and water. Gently pat the site dry. You may apply a band aid daily for 2 days if desired.    · Do not apply powder or lotion to the site.  · Do not submerge the affected site in water for 3 to 5 days or until the site is completely healed.   · Do not lift, push or pull anything over 10 pounds for 2 days after your procedure.  · Inspect the site at least twice daily. You may notice some bruising at the site and it may be tender for 1 to 2 weeks.     · Increase your fluid intake for the next 2 days.    · Keep arm elevated for 24 hours. For the remainder of the day, keep your arm in “Pledge of Allegiance” position when up and about.     · You may drive 24 hours after the procedure unless otherwise instructed by your caregiver.  · Do not operate machinery or power tools for 24 hours.  · A responsible adult should be with you for the first 24 hours after you arrive home. Do not make any important legal decisions or sign legal papers for 24 hours.  Do not drink alcohol for 24 hours.    · Metformin or any medications containing Metformin should not be taken for 48 hours after your procedure.      Call Your Doctor if:   · You have unusual pain at the radial/ulnar (wrist) site.  · You have redness, warmth, swelling, or pain at the radial/ulnar (wrist) site.  · You have drainage (other  than a small amount of blood on the dressing).  · You have chills or a fever > 101.  · Your arm becomes pale or dark, cool, tingly, or numb.  · You have heavy bleeding from the site, hold pressure on the site for 20 minutes.  If the bleeding stops, apply a fresh bandage and call your cardiologist.  However, if you continue to have bleeding, call 911.

## 2019-07-03 NOTE — PERIOPERATIVE NURSING NOTE
Spoke to Dr Sears per pt request to ask for anti anxiety medication for this week prior to sx next week, Dr GALINDO stated AA would be assessing pt and would prescribe prior to sx,  Info relayed to pt and family, verb understanding.  This RN encouraged pt and spouse to reach out to PCP if this was something he felt he needed long term for anxiety.

## 2019-07-05 ENCOUNTER — TELEPHONE (OUTPATIENT)
Dept: CARDIAC SURGERY | Facility: CLINIC | Age: 64
End: 2019-07-05

## 2019-07-05 PROBLEM — I25.10 CORONARY ARTERY DISEASE INVOLVING NATIVE CORONARY ARTERY: Status: ACTIVE | Noted: 2019-07-05

## 2019-07-05 NOTE — TELEPHONE ENCOUNTER
Spoke to patient with his PAT and surgery times. He is to be at the Respiratory Department by 0730 on 7- with all other testing to follow.  His arrival time is 0500 on 7-. He expressed a verbal understanding of this. He was instructed to call the office with further questions.

## 2019-07-10 ENCOUNTER — PREP FOR SURGERY (OUTPATIENT)
Dept: OTHER | Facility: HOSPITAL | Age: 64
End: 2019-07-10

## 2019-07-10 ENCOUNTER — HOSPITAL ENCOUNTER (OUTPATIENT)
Dept: CARDIOLOGY | Facility: HOSPITAL | Age: 64
Discharge: HOME | End: 2019-07-10
Admitting: NURSE PRACTITIONER

## 2019-07-10 ENCOUNTER — HOSPITAL ENCOUNTER (OUTPATIENT)
Dept: RESPIRATORY THERAPY | Facility: HOSPITAL | Age: 64
Discharge: HOME | End: 2019-07-10

## 2019-07-10 ENCOUNTER — HOSPITAL ENCOUNTER (OUTPATIENT)
Dept: GENERAL RADIOLOGY | Facility: HOSPITAL | Age: 64
Discharge: HOME | End: 2019-07-10

## 2019-07-10 ENCOUNTER — ANESTHESIA EVENT (OUTPATIENT)
Dept: PERIOP | Facility: HOSPITAL | Age: 64
End: 2019-07-10

## 2019-07-10 ENCOUNTER — APPOINTMENT (OUTPATIENT)
Dept: PREADMISSION TESTING | Facility: HOSPITAL | Age: 64
End: 2019-07-10

## 2019-07-10 ENCOUNTER — HOSPITAL ENCOUNTER (OUTPATIENT)
Dept: CARDIOLOGY | Facility: HOSPITAL | Age: 64
Discharge: HOME | End: 2019-07-10

## 2019-07-10 VITALS
SYSTOLIC BLOOD PRESSURE: 136 MMHG | TEMPERATURE: 97.5 F | DIASTOLIC BLOOD PRESSURE: 83 MMHG | BODY MASS INDEX: 30.36 KG/M2 | HEART RATE: 67 BPM | RESPIRATION RATE: 16 BRPM | WEIGHT: 205 LBS | HEIGHT: 69 IN | OXYGEN SATURATION: 99 %

## 2019-07-10 VITALS — OXYGEN SATURATION: 98 %

## 2019-07-10 DIAGNOSIS — I25.10 CORONARY ARTERY DISEASE INVOLVING NATIVE CORONARY ARTERY, ANGINA PRESENCE UNSPECIFIED, UNSPECIFIED WHETHER NATIVE OR TRANSPLANTED HEART: ICD-10-CM

## 2019-07-10 DIAGNOSIS — Z01.811 PRE-OPERATIVE RESPIRATORY EXAMINATION: Primary | ICD-10-CM

## 2019-07-10 LAB
ABO GROUP BLD: NORMAL
ALBUMIN SERPL-MCNC: 4.5 G/DL (ref 3.5–5.2)
ALBUMIN/GLOB SERPL: 1.5 G/DL
ALP SERPL-CCNC: 61 U/L (ref 39–117)
ALT SERPL W P-5'-P-CCNC: 30 U/L (ref 1–41)
ANION GAP SERPL CALCULATED.3IONS-SCNC: 10.3 MMOL/L (ref 5–15)
APTT PPP: 28.8 SECONDS (ref 22.7–35.4)
ARTERIAL PATENCY WRIST A: POSITIVE
AST SERPL-CCNC: 20 U/L (ref 1–40)
ATMOSPHERIC PRESS: 753 MMHG
BACTERIA UR QL AUTO: ABNORMAL /HPF
BASE EXCESS BLDA CALC-SCNC: -3 MMOL/L (ref 0–2)
BASOPHILS # BLD AUTO: 0.04 10*3/MM3 (ref 0–0.2)
BASOPHILS NFR BLD AUTO: 0.6 % (ref 0–1.5)
BDY SITE: ABNORMAL
BH CV XLRA MEAS - DIST GSV CALF DIST LEFT: 0.09 CM
BH CV XLRA MEAS - DIST GSV CALF DIST RIGHT: 0.12 CM
BH CV XLRA MEAS - DIST GSV THIGH DIST LEFT: 0.23 CM
BH CV XLRA MEAS - DIST GSV THIGH DIST RIGHT: 0.23 CM
BH CV XLRA MEAS - GSV ANKLE DIST LEFT: 0.08 CM
BH CV XLRA MEAS - GSV ANKLE DIST RIGHT: 0.17 CM
BH CV XLRA MEAS - GSV KNEE DIST LEFT: 0.23 CM
BH CV XLRA MEAS - GSV KNEE DIST RIGHT: 0.19 CM
BH CV XLRA MEAS - GSV ORIGIN DIST LEFT: 0.55 CM
BH CV XLRA MEAS - GSV ORIGIN DIST RIGHT: 0.52 CM
BH CV XLRA MEAS - MID GSV CALF LEFT: 0.11 CM
BH CV XLRA MEAS - MID GSV CALF RIGHT: 0.11 CM
BH CV XLRA MEAS - MID GSV THIGH  LEFT: 0.26 CM
BH CV XLRA MEAS - MID GSV THIGH  RIGHT: 0.21 CM
BH CV XLRA MEAS - PROX GSV CALF DIST LEFT: 0.14 CM
BH CV XLRA MEAS - PROX GSV CALF DIST RIGHT: 0.15 CM
BH CV XLRA MEAS - PROX GSV THIGH  LEFT: 0.26 CM
BH CV XLRA MEAS - PROX GSV THIGH  RIGHT: 0.49 CM
BH CV XLRA MEAS LEFT CCA RATIO VEL: -73.3 CM/SEC
BH CV XLRA MEAS LEFT DIST CCA EDV: -18.2 CM/SEC
BH CV XLRA MEAS LEFT DIST CCA PSV: -73.3 CM/SEC
BH CV XLRA MEAS LEFT DIST ICA EDV: -25.5 CM/SEC
BH CV XLRA MEAS LEFT DIST ICA PSV: -62.1 CM/SEC
BH CV XLRA MEAS LEFT ICA RATIO VEL: -72.1 CM/SEC
BH CV XLRA MEAS LEFT ICA/CCA RATIO: 0.98
BH CV XLRA MEAS LEFT MID ICA EDV: -22.9 CM/SEC
BH CV XLRA MEAS LEFT MID ICA PSV: -69.8 CM/SEC
BH CV XLRA MEAS LEFT PROX CCA EDV: 32.2 CM/SEC
BH CV XLRA MEAS LEFT PROX CCA PSV: 114 CM/SEC
BH CV XLRA MEAS LEFT PROX ECA EDV: -14.7 CM/SEC
BH CV XLRA MEAS LEFT PROX ECA PSV: -98.5 CM/SEC
BH CV XLRA MEAS LEFT PROX ICA EDV: -24.6 CM/SEC
BH CV XLRA MEAS LEFT PROX ICA PSV: -72.1 CM/SEC
BH CV XLRA MEAS LEFT PROX SCLA PSV: 129 CM/SEC
BH CV XLRA MEAS LEFT VERTEBRAL A EDV: 8.6 CM/SEC
BH CV XLRA MEAS LEFT VERTEBRAL A PSV: 46 CM/SEC
BH CV XLRA MEAS RIGHT CCA RATIO VEL: -79.2 CM/SEC
BH CV XLRA MEAS RIGHT DIST CCA EDV: -21.7 CM/SEC
BH CV XLRA MEAS RIGHT DIST CCA PSV: -79.2 CM/SEC
BH CV XLRA MEAS RIGHT DIST ICA EDV: -35.2 CM/SEC
BH CV XLRA MEAS RIGHT DIST ICA PSV: -90.3 CM/SEC
BH CV XLRA MEAS RIGHT ICA RATIO VEL: -90.3 CM/SEC
BH CV XLRA MEAS RIGHT ICA/CCA RATIO: 1.1
BH CV XLRA MEAS RIGHT MID ICA EDV: -24 CM/SEC
BH CV XLRA MEAS RIGHT MID ICA PSV: -68.6 CM/SEC
BH CV XLRA MEAS RIGHT PROX CCA EDV: 12.9 CM/SEC
BH CV XLRA MEAS RIGHT PROX CCA PSV: 95.6 CM/SEC
BH CV XLRA MEAS RIGHT PROX ECA EDV: -10.6 CM/SEC
BH CV XLRA MEAS RIGHT PROX ECA PSV: -83.8 CM/SEC
BH CV XLRA MEAS RIGHT PROX ICA EDV: -17.6 CM/SEC
BH CV XLRA MEAS RIGHT PROX ICA PSV: -65.1 CM/SEC
BH CV XLRA MEAS RIGHT PROX SCLA PSV: 273 CM/SEC
BH CV XLRA MEAS RIGHT VERTEBRAL A EDV: 7.1 CM/SEC
BH CV XLRA MEAS RIGHT VERTEBRAL A PSV: 44 CM/SEC
BILIRUB SERPL-MCNC: 0.3 MG/DL (ref 0.2–1.2)
BILIRUB UR QL STRIP: NEGATIVE
BLD GP AB SCN SERPL QL: NEGATIVE
BUN BLD-MCNC: 14 MG/DL (ref 8–23)
BUN/CREAT SERPL: 14.6 (ref 7–25)
CALCIUM SPEC-SCNC: 9.5 MG/DL (ref 8.6–10.5)
CHLORIDE SERPL-SCNC: 101 MMOL/L (ref 98–107)
CHOLEST SERPL-MCNC: 134 MG/DL (ref 0–200)
CLARITY UR: CLEAR
CLOSE TME COLL+ADP + EPINEP PNL BLD: 93 % (ref 86–100)
CO2 SERPL-SCNC: 26.7 MMOL/L (ref 22–29)
COLOR UR: YELLOW
CREAT BLD-MCNC: 0.96 MG/DL (ref 0.76–1.27)
DEPRECATED RDW RBC AUTO: 44.8 FL (ref 37–54)
EOSINOPHIL # BLD AUTO: 0.2 10*3/MM3 (ref 0–0.4)
EOSINOPHIL NFR BLD AUTO: 3.1 % (ref 0.3–6.2)
ERYTHROCYTE [DISTWIDTH] IN BLOOD BY AUTOMATED COUNT: 12.6 % (ref 12.3–15.4)
GFR SERPL CREATININE-BSD FRML MDRD: 80 ML/MIN/1.73
GLOBULIN UR ELPH-MCNC: 3 GM/DL
GLUCOSE BLD-MCNC: 112 MG/DL (ref 65–99)
GLUCOSE UR STRIP-MCNC: NEGATIVE MG/DL
HBA1C MFR BLD: 5.8 % (ref 4.8–5.6)
HCO3 BLDA-SCNC: 20.6 MMOL/L (ref 22–28)
HCT VFR BLD AUTO: 48.3 % (ref 37.5–51)
HDLC SERPL-MCNC: 48 MG/DL (ref 40–60)
HGB BLD-MCNC: 15.5 G/DL (ref 13–17.7)
HGB UR QL STRIP.AUTO: ABNORMAL
HYALINE CASTS UR QL AUTO: ABNORMAL /LPF
IMM GRANULOCYTES # BLD AUTO: 0.02 10*3/MM3 (ref 0–0.05)
IMM GRANULOCYTES NFR BLD AUTO: 0.3 % (ref 0–0.5)
INR PPP: 0.94 (ref 0.9–1.1)
KETONES UR QL STRIP: NEGATIVE
LDLC SERPL CALC-MCNC: 68 MG/DL (ref 0–100)
LDLC/HDLC SERPL: 1.41 {RATIO}
LEFT ARM BP: NORMAL MMHG
LEUKOCYTE ESTERASE UR QL STRIP.AUTO: NEGATIVE
LYMPHOCYTES # BLD AUTO: 1.85 10*3/MM3 (ref 0.7–3.1)
LYMPHOCYTES NFR BLD AUTO: 28.7 % (ref 19.6–45.3)
MAGNESIUM SERPL-MCNC: 2.4 MG/DL (ref 1.6–2.4)
MCH RBC QN AUTO: 30.9 PG (ref 26.6–33)
MCHC RBC AUTO-ENTMCNC: 32.1 G/DL (ref 31.5–35.7)
MCV RBC AUTO: 96.4 FL (ref 79–97)
MODALITY: ABNORMAL
MONOCYTES # BLD AUTO: 0.8 10*3/MM3 (ref 0.1–0.9)
MONOCYTES NFR BLD AUTO: 12.4 % (ref 5–12)
NEUTROPHILS # BLD AUTO: 3.53 10*3/MM3 (ref 1.7–7)
NEUTROPHILS NFR BLD AUTO: 54.9 % (ref 42.7–76)
NITRITE UR QL STRIP: NEGATIVE
NRBC BLD AUTO-RTO: 0 /100 WBC (ref 0–0.2)
NT-PROBNP SERPL-MCNC: 10.2 PG/ML (ref 5–900)
PCO2 BLDA: 32.3 MM HG (ref 35–45)
PH BLDA: 7.41 PH UNITS (ref 7.35–7.45)
PH UR STRIP.AUTO: 5.5 [PH] (ref 5–8)
PLATELET # BLD AUTO: 179 10*3/MM3 (ref 140–450)
PMV BLD AUTO: 10.7 FL (ref 6–12)
PO2 BLDA: 85.4 MM HG (ref 80–100)
POTASSIUM BLD-SCNC: 4.7 MMOL/L (ref 3.5–5.2)
PROT SERPL-MCNC: 7.5 G/DL (ref 6–8.5)
PROT UR QL STRIP: NEGATIVE
PROTHROMBIN TIME: 12.3 SECONDS (ref 11.7–14.2)
RBC # BLD AUTO: 5.01 10*6/MM3 (ref 4.14–5.8)
RBC # UR: ABNORMAL /HPF
REF LAB TEST METHOD: ABNORMAL
RH BLD: POSITIVE
RIGHT ARM BP: NORMAL MMHG
SAO2 % BLDCOA: 96.7 % (ref 92–99)
SET MECH RESP RATE: 18
SODIUM BLD-SCNC: 138 MMOL/L (ref 136–145)
SP GR UR STRIP: 1.02 (ref 1–1.03)
SQUAMOUS #/AREA URNS HPF: ABNORMAL /HPF
T&S EXPIRATION DATE: NORMAL
TRIGL SERPL-MCNC: 91 MG/DL (ref 0–150)
UROBILINOGEN UR QL STRIP: ABNORMAL
VLDLC SERPL-MCNC: 18.2 MG/DL (ref 5–40)
WBC NRBC COR # BLD: 6.44 10*3/MM3 (ref 3.4–10.8)
WBC UR QL AUTO: ABNORMAL /HPF

## 2019-07-10 PROCEDURE — 85576 BLOOD PLATELET AGGREGATION: CPT | Performed by: NURSE PRACTITIONER

## 2019-07-10 PROCEDURE — 93010 ELECTROCARDIOGRAM REPORT: CPT | Performed by: INTERNAL MEDICINE

## 2019-07-10 PROCEDURE — 82803 BLOOD GASES ANY COMBINATION: CPT

## 2019-07-10 PROCEDURE — 93970 EXTREMITY STUDY: CPT

## 2019-07-10 PROCEDURE — 86920 COMPATIBILITY TEST SPIN: CPT

## 2019-07-10 PROCEDURE — 85730 THROMBOPLASTIN TIME PARTIAL: CPT | Performed by: NURSE PRACTITIONER

## 2019-07-10 PROCEDURE — 71046 X-RAY EXAM CHEST 2 VIEWS: CPT

## 2019-07-10 PROCEDURE — 83735 ASSAY OF MAGNESIUM: CPT | Performed by: NURSE PRACTITIONER

## 2019-07-10 PROCEDURE — 86850 RBC ANTIBODY SCREEN: CPT | Performed by: NURSE PRACTITIONER

## 2019-07-10 PROCEDURE — 86900 BLOOD TYPING SEROLOGIC ABO: CPT | Performed by: NURSE PRACTITIONER

## 2019-07-10 PROCEDURE — 94799 UNLISTED PULMONARY SVC/PX: CPT

## 2019-07-10 PROCEDURE — 85610 PROTHROMBIN TIME: CPT | Performed by: NURSE PRACTITIONER

## 2019-07-10 PROCEDURE — 85025 COMPLETE CBC W/AUTO DIFF WBC: CPT | Performed by: NURSE PRACTITIONER

## 2019-07-10 PROCEDURE — 36600 WITHDRAWAL OF ARTERIAL BLOOD: CPT

## 2019-07-10 PROCEDURE — 83036 HEMOGLOBIN GLYCOSYLATED A1C: CPT | Performed by: NURSE PRACTITIONER

## 2019-07-10 PROCEDURE — 80053 COMPREHEN METABOLIC PANEL: CPT | Performed by: NURSE PRACTITIONER

## 2019-07-10 PROCEDURE — 93880 EXTRACRANIAL BILAT STUDY: CPT

## 2019-07-10 PROCEDURE — 93005 ELECTROCARDIOGRAM TRACING: CPT

## 2019-07-10 PROCEDURE — 36415 COLL VENOUS BLD VENIPUNCTURE: CPT

## 2019-07-10 PROCEDURE — 80061 LIPID PANEL: CPT | Performed by: NURSE PRACTITIONER

## 2019-07-10 PROCEDURE — 86901 BLOOD TYPING SEROLOGIC RH(D): CPT | Performed by: NURSE PRACTITIONER

## 2019-07-10 PROCEDURE — 83880 ASSAY OF NATRIURETIC PEPTIDE: CPT | Performed by: NURSE PRACTITIONER

## 2019-07-10 PROCEDURE — 81001 URINALYSIS AUTO W/SCOPE: CPT | Performed by: NURSE PRACTITIONER

## 2019-07-10 RX ORDER — SIMVASTATIN 40 MG
40 TABLET ORAL NIGHTLY
COMMUNITY
End: 2019-10-02 | Stop reason: SDUPTHER

## 2019-07-10 RX ORDER — CHLORHEXIDINE GLUCONATE 0.12 MG/ML
15 RINSE ORAL
COMMUNITY
End: 2019-07-16 | Stop reason: HOSPADM

## 2019-07-10 RX ORDER — CHLORHEXIDINE GLUCONATE 500 MG/1
1 CLOTH TOPICAL EVERY 12 HOURS PRN
Status: ACTIVE | OUTPATIENT
Start: 2019-07-10

## 2019-07-10 RX ORDER — CHLORHEXIDINE GLUCONATE 0.12 MG/ML
15 RINSE ORAL EVERY 12 HOURS
Status: DISPENSED | OUTPATIENT
Start: 2019-07-10 | End: 2019-07-11

## 2019-07-10 RX ORDER — CHLORHEXIDINE GLUCONATE 500 MG/1
CLOTH TOPICAL
COMMUNITY
End: 2019-07-16 | Stop reason: HOSPADM

## 2019-07-10 NOTE — DISCHARGE INSTRUCTIONS
Take the following medications the morning of surgery with a small sip of water:    NONE    ARRIVE AT 5:00    General Instructions:  • Do not eat solid food after midnight the night before surgery.  • You may drink clear liquids day of surgery but must stop at least one hour before your hospital arrival time.  • It is beneficial for you to have a clear drink that contains carbohydrates the day of surgery.  We suggest a 12 to 20 ounce bottle of Gatorade or Powerade for non-diabetic patients or a 12 to 20 ounce bottle of G2 or Powerade Zero for diabetic patients. (Pediatric patients, are not advised to drink a 12 to 20 ounce carbohydrate drink)    Clear liquids are liquids you can see through.  Nothing red in color.     Plain water                               Sports drinks  Sodas                                   Gelatin (Jell-O)  Fruit juices without pulp such as white grape juice and apple juice  Popsicles that contain no fruit or yogurt  Tea or coffee (no cream or milk added)  Gatorade / Powerade  G2 / Powerade Zero    • Infants may have breast milk up to four hours before surgery.  • Infants drinking formula may drink formula up to six hours before surgery.   • Patients who avoid smoking, chewing tobacco and alcohol for 4 weeks prior to surgery have a reduced risk of post-operative complications.  Quit smoking as many days before surgery as you can.  • Do not smoke, use chewing tobacco or drink alcohol the day of surgery.   • If applicable bring your C-PAP/ BI-PAP machine.  • Bring any papers given to you in the doctor’s office.  • Wear clean comfortable clothes and socks.  • Do not wear contact lenses, false eyelashes or make-up.  Bring a case for your glasses.   • Bring crutches or walker if applicable.  • Remove all piercings.  Leave jewelry and any other valuables at home.  • Hair extensions with metal clips must be removed prior to surgery.  • The Pre-Admission Testing nurse will instruct you to bring  medications if unable to obtain an accurate list in Pre-Admission Testing.        If you were given a blood bank ID arm band remember to bring it with you the day of surgery.    Preventing a Surgical Site Infection:  • For 2 to 3 days before surgery, avoid shaving with a razor because the razor can irritate skin and make it easier to develop an infection.    • Any areas of open skin can increase the risk of a post-operative wound infection by allowing bacteria to enter and travel throughout the body.  Notify your surgeon if you have any skin wounds / rashes even if it is not near the expected surgical site.  The area will need assessed to determine if surgery should be delayed until it is healed.  • The night prior to surgery sleep in a clean bed with clean clothing.  Do not allow pets to sleep with you.  • Shower on the morning of surgery using a fresh bar of anti-bacterial soap (such as Dial) and clean washcloth.  Dry with a clean towel and dress in clean clothing.  • Ask your surgeon if you will be receiving antibiotics prior to surgery.  • Make sure you, your family, and all healthcare providers clean their hands with soap and water or an alcohol based hand  before caring for you or your wound.    Day of surgery:  Upon arrival, a Pre-op nurse and Anesthesiologist will review your health history, obtain vital signs, and answer questions you may have.  The only belongings needed at this time will be a list of your home medications and if applicable your C-PAP/BI-PAP machine.  If you are staying overnight your family can leave the rest of your belongings in the car and bring them to your room later.  A Pre-op nurse will start an IV and you may receive medication in preparation for surgery, including something to help you relax.  Your family will be able to see you in the Pre-op area.  While you are in surgery your family should notify the waiting room  if they leave the waiting room area and  provide a contact phone number.    Please be aware that surgery does come with discomfort.  We want to make every effort to control your discomfort so please discuss any uncontrolled symptoms with your nurse.   Your doctor will most likely have prescribed pain medications.      If you are going home after surgery you will receive individualized written care instructions before being discharged.  A responsible adult must drive you to and from the hospital on the day of your surgery and stay with you for 24 hours.    If you are staying overnight following surgery, you will be transported to your hospital room following the recovery period.  [facility] has all private rooms.    You have received a list of surgical assistants for your reference.  If you have any questions please call Pre-Admission Testing at [phone].  Deductibles and co-payments are collected on the day of service. Please be prepared to pay the required co-pay, deductible or deposit on the day of service as defined by your plan.    2% CHLORAHEXIDINE GLUCONATE* CLOTH  Preparing or “prepping” skin before surgery can reduce the risk of infection at the surgical site. To make the process easier, [facility] has chosen disposable cloths moistened with a rinse-free, 2% Chlorhexidine Gluconate (CHG) antiseptic solution. The steps below outline the prepping process and should be carefully followed.        Use the prep cloth on the area that is circled in the diagram             Directions Night before Surgery  1) Shower using a fresh bar of anti-bacterial soap (such as Dial) and clean washcloth.  Use a clean towel to completely dry your skin.  2) Do not use any lotions, oils or creams on your skin.  3) Open the package and remove 1 cloth, wipe your skin for 30 seconds in a circular motion.  Allow to dry for 3 minutes.  4) Repeat #3 with second cloth.  5) Do not touch your eyes, ears, or mouth with the prep cloth.  6) Allow the wet  prep solution to air dry.  7) Discard the prep cloth and wash your hands with soap and water.   8) Dress in clean bed clothes and sleep on fresh clean bed sheets.   9) You may experience some temporary itching after the prep.    Directions Day of Surgery  1) Repeat steps 1,2,3,4,5,6,7, and 9.   2) Dress in clean clothes before coming to the hospital.    BACTROBAN NASAL OINTMENT  There are many germs normally in your nose. Bactroban is an ointment that will help reduce these germs. Please follow these instructions for Bactroban use:      ____The day before surgery in the morning  Date________    ____The day before surgery in the evening              Date________    ____The day of surgery in the morning    Date________    **Squirt ½ package of Bactroban Ointment onto a cotton applicator and apply to inside of 1st nostril.  Squirt the remaining Bactroban and apply to the inside of the other nostril.    PERIDEX- ORAL:  Use only if your surgeon has ordered  Use the night before and morning of surgery - Swish, gargle, and spit - do not swallow.

## 2019-07-10 NOTE — ANESTHESIA PREPROCEDURE EVALUATION
Anesthesia Evaluation     Patient summary reviewed and Nursing notes reviewed                Airway   Mallampati: II  TM distance: <3 FB  Neck ROM: full  Possible difficult intubation  Dental - normal exam     Pulmonary - normal exam   (+) a smoker Former,   Cardiovascular     ECG reviewed  Rhythm: regular  Rate: normal    (+) hypertension, CAD, angina with exertion, hyperlipidemia,       Neuro/Psych  GI/Hepatic/Renal/Endo    (+) obesity,       Musculoskeletal     Abdominal   (+) obese,    Substance History      OB/GYN          Other                      Anesthesia Plan    ASA 4     general   (Art line/CVC/SGC/TYLOR/post-op vent)  intravenous induction   Anesthetic plan, all risks, benefits, and alternatives have been provided, discussed and informed consent has been obtained with: patient.  Use of blood products discussed with patient .

## 2019-07-10 NOTE — PROGRESS NOTES
Name: Elijah Montero ADMIT: (Not on file)   : 1955  PCP: Nahomi Rae APRN    MRN: [id number] LOS: 0 days   AGE/SEX: 63 y.o. male  ROOM: Room/bed info not found     CC: CAD    Subjective   Patient is a 63 y.o. male presents with the following...    History of Present Illness  Mr. Montero is a 63 year old patient I am meeting for the first time, Dr. Mccall saw him in the cath lab only a few days ago for cardiac surgery evaluation.  He states no changes since he last saw Dr. Mccall        Past Medical History:   Diagnosis Date   • Chest pain     possibly due to pericarditis   • Coronary artery disease     mild to moderate   • Heart disease    • History of positive PPD     as a child    • History of rotator cuff tear     LEFT    • Hyperlipidemia    • Hypertension      Past Surgical History:   Procedure Laterality Date   • CARDIAC CATHETERIZATION      angioplasty with stent    • CARDIAC CATHETERIZATION  2015   • CARDIAC CATHETERIZATION N/A 7/3/2019    Procedure: Left Heart Cath;  Surgeon: Guera Perry MD;  [facility];  Service: Cardiology   • CARDIAC CATHETERIZATION N/A 7/3/2019    Procedure: Coronary angiography;  Surgeon: Guera Perry MD;  Location:  [facility] CATH INVASIVE LOCATION;  Service: Cardiology   • CARDIAC CATHETERIZATION N/A 7/3/2019    Procedure: Left ventriculography;  Surgeon: Guera Perry MD;  Location:  [facility] CATH INVASIVE LOCATION;  Service: Cardiology   • CORONARY STENT PLACEMENT     • SKIN CANCER EXCISION N/A    • TONSILLECTOMY       Family History   Problem Relation Age of Onset   • Other Mother 65        cabg   • Heart disease Father    • Other Father 45        cabg   • Other Sister         BLOOD DISEASE    • Malig Hyperthermia Neg Hx      Social History     Tobacco Use   • Smoking status: Former Smoker     Packs/day: 1.00     Years: 13.00     Pack years: 13.00     Types: Cigarettes   •  Smokeless tobacco: Never Used   • Tobacco comment: QUIT AGE 29   Substance Use Topics   • Alcohol use: Yes     Comment: THREE TIMES WEEK, BEER   • Drug use: No     No medications prior to admission.     Allergies:  Patient has no known allergies.    Review of Systems   All other systems reviewed and are negative.       Objective    Vital Signs  Temp:  [97.5 °F (36.4 °C)] 97.5 °F (36.4 °C)  Heart Rate:  [67] 67  Resp:  [16] 16  BP: (136-144)/(83) 136/83  SpO2:  [98 %-99 %] 99 %  on   ;   Device (Oxygen Therapy): room air  There is no height or weight on file to calculate BMI.    Physical Exam   Constitutional: He is oriented to person, place, and time. He appears well-developed and well-nourished. No distress.   HENT:   Head: Normocephalic and atraumatic.   Nose: Nose normal.   Mouth/Throat: Oropharynx is clear and moist. No oropharyngeal exudate.   Eyes: Conjunctivae and EOM are normal. Pupils are equal, round, and reactive to light. No scleral icterus.   Neck: Normal range of motion. Neck supple. No JVD present.   Cardiovascular: Normal rate, regular rhythm, normal heart sounds and intact distal pulses.   No murmur heard.  Pulmonary/Chest: Effort normal and breath sounds normal. No respiratory distress.   Abdominal: Soft. Bowel sounds are normal. He exhibits no distension.   Musculoskeletal: Normal range of motion. He exhibits no edema.   Neurological: He is alert and oriented to person, place, and time. No cranial nerve deficit.   Skin: Skin is warm and dry. He is not diaphoretic. No erythema.       Results Review:   I reviewed the patient's new clinical results.    Assessment/Plan       Coronary artery disease involving native coronary artery      Assessment & Plan      Reviewed preop labs.  All within normal limits  Vein mapping adequate bilateral thighs  No stenosis noted on carotid duplex.    Reviewed all questions and concerns. He verbalized understanding.  Plan for OR in AM.    Nicky Santiago  MYLES  07/10/19  1:56 PM

## 2019-07-11 ENCOUNTER — ANESTHESIA (OUTPATIENT)
Dept: PERIOP | Facility: HOSPITAL | Age: 64
End: 2019-07-11

## 2019-07-11 ENCOUNTER — APPOINTMENT (OUTPATIENT)
Dept: GENERAL RADIOLOGY | Facility: HOSPITAL | Age: 64
End: 2019-07-11

## 2019-07-11 ENCOUNTER — HOSPITAL ENCOUNTER (INPATIENT)
Facility: HOSPITAL | Age: 64
LOS: 5 days | Discharge: HOME HEALTH | End: 2019-07-16
Attending: THORACIC SURGERY (CARDIOTHORACIC VASCULAR SURGERY) | Admitting: THORACIC SURGERY (CARDIOTHORACIC VASCULAR SURGERY)

## 2019-07-11 ENCOUNTER — ANCILLARY PROCEDURE (OUTPATIENT)
Dept: PERIOP | Facility: HOSPITAL | Age: 64
End: 2019-07-11

## 2019-07-11 DIAGNOSIS — I25.10 CORONARY ARTERY DISEASE INVOLVING NATIVE CORONARY ARTERY OF NATIVE HEART WITHOUT ANGINA PECTORIS: ICD-10-CM

## 2019-07-11 DIAGNOSIS — I25.10 CORONARY ARTERY DISEASE INVOLVING NATIVE CORONARY ARTERY, ANGINA PRESENCE UNSPECIFIED, UNSPECIFIED WHETHER NATIVE OR TRANSPLANTED HEART: ICD-10-CM

## 2019-07-11 DIAGNOSIS — Z74.09 IMPAIRED MOBILITY: Primary | ICD-10-CM

## 2019-07-11 LAB
ACT BLD: 120 SECONDS (ref 82–152)
ACT BLD: 334 SECONDS (ref 82–152)
ACT BLD: 384 SECONDS (ref 82–152)
ACT BLD: 428 SECONDS (ref 82–152)
ACT BLD: 538 SECONDS (ref 82–152)
ACT BLD: 98 SECONDS (ref 82–152)
ALBUMIN SERPL-MCNC: 3.7 G/DL (ref 3.5–5.2)
ALBUMIN SERPL-MCNC: 4.5 G/DL (ref 3.5–5.2)
ANION GAP SERPL CALCULATED.3IONS-SCNC: 12.3 MMOL/L (ref 5–15)
ANION GAP SERPL CALCULATED.3IONS-SCNC: 9.3 MMOL/L (ref 5–15)
APTT PPP: 29.8 SECONDS (ref 22.7–35.4)
ARTERIAL PATENCY WRIST A: ABNORMAL
ATMOSPHERIC PRESS: 743.4 MMHG
ATMOSPHERIC PRESS: 745.5 MMHG
ATMOSPHERIC PRESS: 745.7 MMHG
BASE EXCESS BLDA CALC-SCNC: -1.4 MMOL/L (ref 0–2)
BASE EXCESS BLDA CALC-SCNC: -2 MMOL/L (ref 0–2)
BASE EXCESS BLDA CALC-SCNC: -2.3 MMOL/L (ref 0–2)
BASOPHILS # BLD AUTO: 0.02 10*3/MM3 (ref 0–0.2)
BASOPHILS NFR BLD AUTO: 0.2 % (ref 0–1.5)
BDY SITE: ABNORMAL
BUN BLD-MCNC: 11 MG/DL (ref 8–23)
BUN BLD-MCNC: 12 MG/DL (ref 8–23)
BUN/CREAT SERPL: 11.2 (ref 7–25)
BUN/CREAT SERPL: 13.2 (ref 7–25)
CA-I BLD-MCNC: 4.8 MG/DL (ref 4.6–5.4)
CA-I SERPL ISE-MCNC: 1.21 MMOL/L (ref 1.15–1.35)
CALCIUM SPEC-SCNC: 8 MG/DL (ref 8.6–10.5)
CALCIUM SPEC-SCNC: 8.2 MG/DL (ref 8.6–10.5)
CHLORIDE SERPL-SCNC: 111 MMOL/L (ref 98–107)
CHLORIDE SERPL-SCNC: 112 MMOL/L (ref 98–107)
CO2 SERPL-SCNC: 22.7 MMOL/L (ref 22–29)
CO2 SERPL-SCNC: 22.7 MMOL/L (ref 22–29)
CREAT BLD-MCNC: 0.91 MG/DL (ref 0.76–1.27)
CREAT BLD-MCNC: 0.98 MG/DL (ref 0.76–1.27)
DEPRECATED RDW RBC AUTO: 45.4 FL (ref 37–54)
DEPRECATED RDW RBC AUTO: 45.4 FL (ref 37–54)
EOSINOPHIL # BLD AUTO: 0.1 10*3/MM3 (ref 0–0.4)
EOSINOPHIL NFR BLD AUTO: 0.8 % (ref 0.3–6.2)
ERYTHROCYTE [DISTWIDTH] IN BLOOD BY AUTOMATED COUNT: 12.7 % (ref 12.3–15.4)
ERYTHROCYTE [DISTWIDTH] IN BLOOD BY AUTOMATED COUNT: 12.9 % (ref 12.3–15.4)
FIBRINOGEN PPP-MCNC: 165 MG/DL (ref 219–464)
GFR SERPL CREATININE-BSD FRML MDRD: 78 ML/MIN/1.73
GFR SERPL CREATININE-BSD FRML MDRD: 85 ML/MIN/1.73
GLUCOSE BLD-MCNC: 129 MG/DL (ref 65–99)
GLUCOSE BLD-MCNC: 94 MG/DL (ref 65–99)
GLUCOSE BLDC GLUCOMTR-MCNC: 121 MG/DL (ref 70–130)
GLUCOSE BLDC GLUCOMTR-MCNC: 126 MG/DL (ref 70–130)
GLUCOSE BLDC GLUCOMTR-MCNC: 135 MG/DL (ref 70–130)
GLUCOSE BLDC GLUCOMTR-MCNC: 89 MG/DL (ref 70–130)
GLUCOSE BLDC GLUCOMTR-MCNC: 92 MG/DL (ref 70–130)
HCO3 BLDA-SCNC: 22.1 MMOL/L (ref 22–28)
HCO3 BLDA-SCNC: 22.6 MMOL/L (ref 22–28)
HCO3 BLDA-SCNC: 22.6 MMOL/L (ref 22–28)
HCT VFR BLD AUTO: 33.7 % (ref 37.5–51)
HCT VFR BLD AUTO: 37.7 % (ref 37.5–51)
HGB BLD-MCNC: 11.2 G/DL (ref 13–17.7)
HGB BLD-MCNC: 12.4 G/DL (ref 13–17.7)
HOROWITZ INDEX BLD+IHG-RTO: 100 %
HOROWITZ INDEX BLD+IHG-RTO: 40 %
HOROWITZ INDEX BLD+IHG-RTO: 40 %
IMM GRANULOCYTES # BLD AUTO: 0.1 10*3/MM3 (ref 0–0.05)
IMM GRANULOCYTES NFR BLD AUTO: 0.8 % (ref 0–0.5)
INR PPP: 1.42 (ref 0.9–1.1)
LYMPHOCYTES # BLD AUTO: 1.71 10*3/MM3 (ref 0.7–3.1)
LYMPHOCYTES NFR BLD AUTO: 13.4 % (ref 19.6–45.3)
MAGNESIUM SERPL-MCNC: 2.4 MG/DL (ref 1.6–2.4)
MAGNESIUM SERPL-MCNC: 2.8 MG/DL (ref 1.6–2.4)
MCH RBC QN AUTO: 31.9 PG (ref 26.6–33)
MCH RBC QN AUTO: 32 PG (ref 26.6–33)
MCHC RBC AUTO-ENTMCNC: 32.9 G/DL (ref 31.5–35.7)
MCHC RBC AUTO-ENTMCNC: 33.2 G/DL (ref 31.5–35.7)
MCV RBC AUTO: 96.3 FL (ref 79–97)
MCV RBC AUTO: 96.9 FL (ref 79–97)
MODALITY: ABNORMAL
MONOCYTES # BLD AUTO: 1.09 10*3/MM3 (ref 0.1–0.9)
MONOCYTES NFR BLD AUTO: 8.5 % (ref 5–12)
NEUTROPHILS # BLD AUTO: 9.73 10*3/MM3 (ref 1.7–7)
NEUTROPHILS NFR BLD AUTO: 76.3 % (ref 42.7–76)
NRBC BLD AUTO-RTO: 0 /100 WBC (ref 0–0.2)
O2 A-A PPRESDIFF RESPIRATORY: 0.4 MMHG
O2 A-A PPRESDIFF RESPIRATORY: 0.5 MMHG
O2 A-A PPRESDIFF RESPIRATORY: 0.5 MMHG
PCO2 BLDA: 34.6 MM HG (ref 35–45)
PCO2 BLDA: 35.6 MM HG (ref 35–45)
PCO2 BLDA: 36.9 MM HG (ref 35–45)
PEEP RESPIRATORY: 5 CM[H2O]
PEEP RESPIRATORY: 5 CM[H2O]
PEEP RESPIRATORY: 7.5 CM[H2O]
PH BLDA: 7.39 PH UNITS (ref 7.35–7.45)
PH BLDA: 7.4 PH UNITS (ref 7.35–7.45)
PH BLDA: 7.42 PH UNITS (ref 7.35–7.45)
PHOSPHATE SERPL-MCNC: 2.7 MG/DL (ref 2.5–4.5)
PHOSPHATE SERPL-MCNC: 3.1 MG/DL (ref 2.5–4.5)
PLATELET # BLD AUTO: 83 10*3/MM3 (ref 140–450)
PLATELET # BLD AUTO: 92 10*3/MM3 (ref 140–450)
PMV BLD AUTO: 10.2 FL (ref 6–12)
PMV BLD AUTO: 10.6 FL (ref 6–12)
PO2 BLDA: 105.9 MM HG (ref 80–100)
PO2 BLDA: 113.3 MM HG (ref 80–100)
PO2 BLDA: 372.1 MM HG (ref 80–100)
POTASSIUM BLD-SCNC: 3.9 MMOL/L (ref 3.5–5.2)
POTASSIUM BLD-SCNC: 4.4 MMOL/L (ref 3.5–5.2)
PROTHROMBIN TIME: 17 SECONDS (ref 11.7–14.2)
PSV: 8 CMH2O
RBC # BLD AUTO: 3.5 10*6/MM3 (ref 4.14–5.8)
RBC # BLD AUTO: 3.89 10*6/MM3 (ref 4.14–5.8)
SAO2 % BLDCOA: 100 % (ref 92–99)
SAO2 % BLDCOA: 98.1 % (ref 92–99)
SAO2 % BLDCOA: 98.4 % (ref 92–99)
SET MECH RESP RATE: 14
SET MECH RESP RATE: 16
SET MECH RESP RATE: 28
SODIUM BLD-SCNC: 144 MMOL/L (ref 136–145)
SODIUM BLD-SCNC: 146 MMOL/L (ref 136–145)
TOTAL RATE: 14 BREATHS/MINUTE
TOTAL RATE: 16 BREATHS/MINUTE
TOTAL RATE: 28 BREATHS/MINUTE
VENTILATOR MODE: ABNORMAL
VT ON VENT VENT: 600 ML
VT ON VENT VENT: 608 ML
VT ON VENT VENT: 750 ML
WBC NRBC COR # BLD: 12.75 10*3/MM3 (ref 3.4–10.8)
WBC NRBC COR # BLD: 8.37 10*3/MM3 (ref 3.4–10.8)

## 2019-07-11 PROCEDURE — 25010000002 FENTANYL CITRATE (PF) 100 MCG/2ML SOLUTION: Performed by: ANESTHESIOLOGY

## 2019-07-11 PROCEDURE — 94799 UNLISTED PULMONARY SVC/PX: CPT

## 2019-07-11 PROCEDURE — 25010000002 ALBUMIN HUMAN 5% PER 50 ML: Performed by: THORACIC SURGERY (CARDIOTHORACIC VASCULAR SURGERY)

## 2019-07-11 PROCEDURE — 71045 X-RAY EXAM CHEST 1 VIEW: CPT

## 2019-07-11 PROCEDURE — P9041 ALBUMIN (HUMAN),5%, 50ML: HCPCS | Performed by: THORACIC SURGERY (CARDIOTHORACIC VASCULAR SURGERY)

## 2019-07-11 PROCEDURE — 25010000002 HEPARIN (PORCINE) PER 1000 UNITS: Performed by: ANESTHESIOLOGY

## 2019-07-11 PROCEDURE — 33519 CABG ARTERY-VEIN THREE: CPT | Performed by: THORACIC SURGERY (CARDIOTHORACIC VASCULAR SURGERY)

## 2019-07-11 PROCEDURE — 85025 COMPLETE CBC W/AUTO DIFF WBC: CPT | Performed by: THORACIC SURGERY (CARDIOTHORACIC VASCULAR SURGERY)

## 2019-07-11 PROCEDURE — 25010000002 METOCLOPRAMIDE PER 10 MG: Performed by: THORACIC SURGERY (CARDIOTHORACIC VASCULAR SURGERY)

## 2019-07-11 PROCEDURE — 25010000002 MIDAZOLAM PER 1 MG: Performed by: ANESTHESIOLOGY

## 2019-07-11 PROCEDURE — C1729 CATH, DRAINAGE: HCPCS | Performed by: THORACIC SURGERY (CARDIOTHORACIC VASCULAR SURGERY)

## 2019-07-11 PROCEDURE — 25010000002 ONDANSETRON PER 1 MG: Performed by: ANESTHESIOLOGY

## 2019-07-11 PROCEDURE — 85610 PROTHROMBIN TIME: CPT | Performed by: THORACIC SURGERY (CARDIOTHORACIC VASCULAR SURGERY)

## 2019-07-11 PROCEDURE — C1751 CATH, INF, PER/CENT/MIDLINE: HCPCS | Performed by: ANESTHESIOLOGY

## 2019-07-11 PROCEDURE — 93318 ECHO TRANSESOPHAGEAL INTRAOP: CPT | Performed by: ANESTHESIOLOGY

## 2019-07-11 PROCEDURE — 33508 ENDOSCOPIC VEIN HARVEST: CPT | Performed by: THORACIC SURGERY (CARDIOTHORACIC VASCULAR SURGERY)

## 2019-07-11 PROCEDURE — 33519 CABG ARTERY-VEIN THREE: CPT | Performed by: PHYSICIAN ASSISTANT

## 2019-07-11 PROCEDURE — 33508 ENDOSCOPIC VEIN HARVEST: CPT | Performed by: PHYSICIAN ASSISTANT

## 2019-07-11 PROCEDURE — 82947 ASSAY GLUCOSE BLOOD QUANT: CPT

## 2019-07-11 PROCEDURE — 82330 ASSAY OF CALCIUM: CPT | Performed by: THORACIC SURGERY (CARDIOTHORACIC VASCULAR SURGERY)

## 2019-07-11 PROCEDURE — 06BP4ZZ EXCISION OF RIGHT SAPHENOUS VEIN, PERCUTANEOUS ENDOSCOPIC APPROACH: ICD-10-PCS | Performed by: THORACIC SURGERY (CARDIOTHORACIC VASCULAR SURGERY)

## 2019-07-11 PROCEDURE — P9047 ALBUMIN (HUMAN), 25%, 50ML: HCPCS

## 2019-07-11 PROCEDURE — 25010000002 PROPOFOL 1000 MG/ML EMULSION: Performed by: THORACIC SURGERY (CARDIOTHORACIC VASCULAR SURGERY)

## 2019-07-11 PROCEDURE — 94002 VENT MGMT INPAT INIT DAY: CPT

## 2019-07-11 PROCEDURE — A4648 IMPLANTABLE TISSUE MARKER: HCPCS | Performed by: THORACIC SURGERY (CARDIOTHORACIC VASCULAR SURGERY)

## 2019-07-11 PROCEDURE — 5A1221Z PERFORMANCE OF CARDIAC OUTPUT, CONTINUOUS: ICD-10-PCS | Performed by: THORACIC SURGERY (CARDIOTHORACIC VASCULAR SURGERY)

## 2019-07-11 PROCEDURE — 85018 HEMOGLOBIN: CPT

## 2019-07-11 PROCEDURE — 33533 CABG ARTERIAL SINGLE: CPT | Performed by: PHYSICIAN ASSISTANT

## 2019-07-11 PROCEDURE — 25010000003 CEFAZOLIN IN DEXTROSE 2-4 GM/100ML-% SOLUTION: Performed by: NURSE PRACTITIONER

## 2019-07-11 PROCEDURE — 25010000002 MIDAZOLAM PER 1 MG: Performed by: THORACIC SURGERY (CARDIOTHORACIC VASCULAR SURGERY)

## 2019-07-11 PROCEDURE — 85347 COAGULATION TIME ACTIVATED: CPT

## 2019-07-11 PROCEDURE — 85027 COMPLETE CBC AUTOMATED: CPT | Performed by: THORACIC SURGERY (CARDIOTHORACIC VASCULAR SURGERY)

## 2019-07-11 PROCEDURE — 93005 ELECTROCARDIOGRAM TRACING: CPT | Performed by: THORACIC SURGERY (CARDIOTHORACIC VASCULAR SURGERY)

## 2019-07-11 PROCEDURE — 93010 ELECTROCARDIOGRAM REPORT: CPT | Performed by: INTERNAL MEDICINE

## 2019-07-11 PROCEDURE — 021209W BYPASS CORONARY ARTERY, THREE ARTERIES FROM AORTA WITH AUTOLOGOUS VENOUS TISSUE, OPEN APPROACH: ICD-10-PCS | Performed by: THORACIC SURGERY (CARDIOTHORACIC VASCULAR SURGERY)

## 2019-07-11 PROCEDURE — 85730 THROMBOPLASTIN TIME PARTIAL: CPT | Performed by: THORACIC SURGERY (CARDIOTHORACIC VASCULAR SURGERY)

## 2019-07-11 PROCEDURE — 25010000002 HEPARIN (PORCINE) PER 1000 UNITS: Performed by: THORACIC SURGERY (CARDIOTHORACIC VASCULAR SURGERY)

## 2019-07-11 PROCEDURE — C1713 ANCHOR/SCREW BN/BN,TIS/BN: HCPCS | Performed by: THORACIC SURGERY (CARDIOTHORACIC VASCULAR SURGERY)

## 2019-07-11 PROCEDURE — 25010000002 MAGNESIUM SULFATE PER 500 MG OF MAGNESIUM: Performed by: ANESTHESIOLOGY

## 2019-07-11 PROCEDURE — 80069 RENAL FUNCTION PANEL: CPT | Performed by: THORACIC SURGERY (CARDIOTHORACIC VASCULAR SURGERY)

## 2019-07-11 PROCEDURE — 25010000002 MORPHINE PER 10 MG: Performed by: THORACIC SURGERY (CARDIOTHORACIC VASCULAR SURGERY)

## 2019-07-11 PROCEDURE — 02100Z9 BYPASS CORONARY ARTERY, ONE ARTERY FROM LEFT INTERNAL MAMMARY, OPEN APPROACH: ICD-10-PCS | Performed by: THORACIC SURGERY (CARDIOTHORACIC VASCULAR SURGERY)

## 2019-07-11 PROCEDURE — 85014 HEMATOCRIT: CPT

## 2019-07-11 PROCEDURE — 99253 IP/OBS CNSLTJ NEW/EST LOW 45: CPT | Performed by: INTERNAL MEDICINE

## 2019-07-11 PROCEDURE — 63710000001 INSULIN REGULAR HUMAN PER 5 UNITS: Performed by: ANESTHESIOLOGY

## 2019-07-11 PROCEDURE — 83735 ASSAY OF MAGNESIUM: CPT | Performed by: THORACIC SURGERY (CARDIOTHORACIC VASCULAR SURGERY)

## 2019-07-11 PROCEDURE — 25010000003 CEFAZOLIN IN DEXTROSE 2-4 GM/100ML-% SOLUTION: Performed by: THORACIC SURGERY (CARDIOTHORACIC VASCULAR SURGERY)

## 2019-07-11 PROCEDURE — 25010000002 PROTAMINE SULFATE PER 10 MG: Performed by: ANESTHESIOLOGY

## 2019-07-11 PROCEDURE — 33533 CABG ARTERIAL SINGLE: CPT | Performed by: THORACIC SURGERY (CARDIOTHORACIC VASCULAR SURGERY)

## 2019-07-11 PROCEDURE — 82962 GLUCOSE BLOOD TEST: CPT

## 2019-07-11 PROCEDURE — 82803 BLOOD GASES ANY COMBINATION: CPT

## 2019-07-11 PROCEDURE — 25010000002 PAPAVERINE PER 60 MG: Performed by: THORACIC SURGERY (CARDIOTHORACIC VASCULAR SURGERY)

## 2019-07-11 PROCEDURE — 25010000002 NEOSTIGMINE 0.5 MG/ML SOLUTION: Performed by: ANESTHESIOLOGY

## 2019-07-11 PROCEDURE — 85384 FIBRINOGEN ACTIVITY: CPT | Performed by: THORACIC SURGERY (CARDIOTHORACIC VASCULAR SURGERY)

## 2019-07-11 PROCEDURE — 25010000002 ALBUMIN HUMAN 25% PER 50 ML

## 2019-07-11 PROCEDURE — 25010000002 PHENYLEPHRINE PER 1 ML: Performed by: ANESTHESIOLOGY

## 2019-07-11 PROCEDURE — 25010000002 HEPARIN (PORCINE) PER 1000 UNITS

## 2019-07-11 PROCEDURE — 25010000002 VANCOMYCIN 1 G RECONSTITUTED SOLUTION

## 2019-07-11 PROCEDURE — 25010000002 PROPOFOL 10 MG/ML EMULSION: Performed by: ANESTHESIOLOGY

## 2019-07-11 PROCEDURE — 25010000003 POTASSIUM CHLORIDE PER 2 MEQ: Performed by: THORACIC SURGERY (CARDIOTHORACIC VASCULAR SURGERY)

## 2019-07-11 DEVICE — SS SUTURE, 4 PER SLEEVE
Type: IMPLANTABLE DEVICE | Site: STERNUM | Status: FUNCTIONAL
Brand: MYO/WIRE II

## 2019-07-11 RX ORDER — MORPHINE SULFATE 2 MG/ML
4 INJECTION, SOLUTION INTRAMUSCULAR; INTRAVENOUS
Status: DISCONTINUED | OUTPATIENT
Start: 2019-07-11 | End: 2019-07-16 | Stop reason: HOSPADM

## 2019-07-11 RX ORDER — AMINOCAPROIC ACID 250 MG/ML
INJECTION, SOLUTION INTRAVENOUS AS NEEDED
Status: DISCONTINUED | OUTPATIENT
Start: 2019-07-11 | End: 2019-07-11 | Stop reason: SURG

## 2019-07-11 RX ORDER — CHLORHEXIDINE GLUCONATE 0.12 MG/ML
15 RINSE ORAL ONCE
Status: DISCONTINUED | OUTPATIENT
Start: 2019-07-11 | End: 2019-07-11 | Stop reason: HOSPADM

## 2019-07-11 RX ORDER — SODIUM CHLORIDE 0.9 % (FLUSH) 0.9 %
3 SYRINGE (ML) INJECTION EVERY 12 HOURS SCHEDULED
Status: DISCONTINUED | OUTPATIENT
Start: 2019-07-11 | End: 2019-07-11 | Stop reason: HOSPADM

## 2019-07-11 RX ORDER — POTASSIUM CHLORIDE 29.8 MG/ML
20 INJECTION INTRAVENOUS
Status: DISCONTINUED | OUTPATIENT
Start: 2019-07-11 | End: 2019-07-16 | Stop reason: HOSPADM

## 2019-07-11 RX ORDER — CHLORHEXIDINE GLUCONATE 0.12 MG/ML
15 RINSE ORAL EVERY 12 HOURS
Status: DISCONTINUED | OUTPATIENT
Start: 2019-07-11 | End: 2019-07-15

## 2019-07-11 RX ORDER — NICARDIPINE HYDROCHLORIDE 2.5 MG/ML
INJECTION INTRAVENOUS AS NEEDED
Status: DISCONTINUED | OUTPATIENT
Start: 2019-07-11 | End: 2019-07-11 | Stop reason: SURG

## 2019-07-11 RX ORDER — NITROGLYCERIN 20 MG/100ML
5-200 INJECTION INTRAVENOUS
Status: DISCONTINUED | OUTPATIENT
Start: 2019-07-11 | End: 2019-07-16 | Stop reason: HOSPADM

## 2019-07-11 RX ORDER — SODIUM CHLORIDE 0.9 % (FLUSH) 0.9 %
30 SYRINGE (ML) INJECTION ONCE AS NEEDED
Status: COMPLETED | OUTPATIENT
Start: 2019-07-11 | End: 2019-07-14

## 2019-07-11 RX ORDER — POTASSIUM CHLORIDE 29.8 MG/ML
20 INJECTION INTRAVENOUS
Status: COMPLETED | OUTPATIENT
Start: 2019-07-11 | End: 2019-07-11

## 2019-07-11 RX ORDER — EPHEDRINE SULFATE 50 MG/ML
INJECTION, SOLUTION INTRAVENOUS AS NEEDED
Status: DISCONTINUED | OUTPATIENT
Start: 2019-07-11 | End: 2019-07-11 | Stop reason: SURG

## 2019-07-11 RX ORDER — CHLORHEXIDINE GLUCONATE 500 MG/1
1 CLOTH TOPICAL EVERY 12 HOURS PRN
Status: DISCONTINUED | OUTPATIENT
Start: 2019-07-11 | End: 2019-07-11 | Stop reason: HOSPADM

## 2019-07-11 RX ORDER — MAGNESIUM SULFATE 1 G/100ML
1 INJECTION INTRAVENOUS EVERY 8 HOURS
Status: DISCONTINUED | OUTPATIENT
Start: 2019-07-11 | End: 2019-07-12

## 2019-07-11 RX ORDER — PAPAVERINE HYDROCHLORIDE 30 MG/ML
INJECTION INTRAMUSCULAR; INTRAVENOUS AS NEEDED
Status: DISCONTINUED | OUTPATIENT
Start: 2019-07-11 | End: 2019-07-11 | Stop reason: HOSPADM

## 2019-07-11 RX ORDER — SENNA AND DOCUSATE SODIUM 50; 8.6 MG/1; MG/1
2 TABLET, FILM COATED ORAL NIGHTLY
Status: DISCONTINUED | OUTPATIENT
Start: 2019-07-12 | End: 2019-07-15

## 2019-07-11 RX ORDER — MORPHINE SULFATE 2 MG/ML
1 INJECTION, SOLUTION INTRAMUSCULAR; INTRAVENOUS EVERY 4 HOURS PRN
Status: DISCONTINUED | OUTPATIENT
Start: 2019-07-11 | End: 2019-07-16 | Stop reason: HOSPADM

## 2019-07-11 RX ORDER — NITROGLYCERIN 5 MG/ML
INJECTION, SOLUTION INTRAVENOUS AS NEEDED
Status: DISCONTINUED | OUTPATIENT
Start: 2019-07-11 | End: 2019-07-11 | Stop reason: SURG

## 2019-07-11 RX ORDER — CYCLOBENZAPRINE HCL 10 MG
10 TABLET ORAL EVERY 8 HOURS PRN
Status: DISCONTINUED | OUTPATIENT
Start: 2019-07-12 | End: 2019-07-16 | Stop reason: HOSPADM

## 2019-07-11 RX ORDER — SODIUM CHLORIDE, SODIUM LACTATE, POTASSIUM CHLORIDE, CALCIUM CHLORIDE 600; 310; 30; 20 MG/100ML; MG/100ML; MG/100ML; MG/100ML
9 INJECTION, SOLUTION INTRAVENOUS CONTINUOUS PRN
Status: DISCONTINUED | OUTPATIENT
Start: 2019-07-11 | End: 2019-07-11 | Stop reason: HOSPADM

## 2019-07-11 RX ORDER — SUFENTANIL CITRATE 50 UG/ML
INJECTION EPIDURAL; INTRAVENOUS AS NEEDED
Status: DISCONTINUED | OUTPATIENT
Start: 2019-07-11 | End: 2019-07-11 | Stop reason: SURG

## 2019-07-11 RX ORDER — BISACODYL 5 MG/1
10 TABLET, DELAYED RELEASE ORAL DAILY PRN
Status: DISCONTINUED | OUTPATIENT
Start: 2019-07-11 | End: 2019-07-16 | Stop reason: HOSPADM

## 2019-07-11 RX ORDER — METOCLOPRAMIDE HYDROCHLORIDE 5 MG/ML
10 INJECTION INTRAMUSCULAR; INTRAVENOUS EVERY 6 HOURS
Status: DISCONTINUED | OUTPATIENT
Start: 2019-07-11 | End: 2019-07-12

## 2019-07-11 RX ORDER — POTASSIUM CHLORIDE 750 MG/1
40 CAPSULE, EXTENDED RELEASE ORAL AS NEEDED
Status: DISCONTINUED | OUTPATIENT
Start: 2019-07-11 | End: 2019-07-16 | Stop reason: HOSPADM

## 2019-07-11 RX ORDER — LIDOCAINE HYDROCHLORIDE 20 MG/ML
INJECTION, SOLUTION INFILTRATION; PERINEURAL AS NEEDED
Status: DISCONTINUED | OUTPATIENT
Start: 2019-07-11 | End: 2019-07-11 | Stop reason: SURG

## 2019-07-11 RX ORDER — HEPARIN SODIUM 1000 [USP'U]/ML
INJECTION, SOLUTION INTRAVENOUS; SUBCUTANEOUS AS NEEDED
Status: DISCONTINUED | OUTPATIENT
Start: 2019-07-11 | End: 2019-07-11 | Stop reason: SURG

## 2019-07-11 RX ORDER — ONDANSETRON 2 MG/ML
4 INJECTION INTRAMUSCULAR; INTRAVENOUS EVERY 6 HOURS PRN
Status: DISCONTINUED | OUTPATIENT
Start: 2019-07-11 | End: 2019-07-16 | Stop reason: HOSPADM

## 2019-07-11 RX ORDER — LANOLIN ALCOHOL/MO/W.PET/CERES
400 CREAM (GRAM) TOPICAL DAILY
COMMUNITY

## 2019-07-11 RX ORDER — MIDAZOLAM HYDROCHLORIDE 1 MG/ML
2 INJECTION INTRAMUSCULAR; INTRAVENOUS
Status: DISCONTINUED | OUTPATIENT
Start: 2019-07-11 | End: 2019-07-11 | Stop reason: HOSPADM

## 2019-07-11 RX ORDER — NALOXONE HCL 0.4 MG/ML
0.4 VIAL (ML) INJECTION
Status: DISCONTINUED | OUTPATIENT
Start: 2019-07-11 | End: 2019-07-16 | Stop reason: HOSPADM

## 2019-07-11 RX ORDER — NOREPINEPHRINE BIT/0.9 % NACL 8 MG/250ML
.02-.3 INFUSION BOTTLE (ML) INTRAVENOUS CONTINUOUS PRN
Status: DISCONTINUED | OUTPATIENT
Start: 2019-07-11 | End: 2019-07-12

## 2019-07-11 RX ORDER — MIDAZOLAM HYDROCHLORIDE 1 MG/ML
1 INJECTION INTRAMUSCULAR; INTRAVENOUS
Status: DISCONTINUED | OUTPATIENT
Start: 2019-07-11 | End: 2019-07-11 | Stop reason: HOSPADM

## 2019-07-11 RX ORDER — PROTAMINE SULFATE 10 MG/ML
INJECTION, SOLUTION INTRAVENOUS AS NEEDED
Status: DISCONTINUED | OUTPATIENT
Start: 2019-07-11 | End: 2019-07-11 | Stop reason: SURG

## 2019-07-11 RX ORDER — POTASSIUM CHLORIDE 7.45 MG/ML
10 INJECTION INTRAVENOUS
Status: DISCONTINUED | OUTPATIENT
Start: 2019-07-11 | End: 2019-07-16 | Stop reason: HOSPADM

## 2019-07-11 RX ORDER — SODIUM CHLORIDE 9 MG/ML
30 INJECTION, SOLUTION INTRAVENOUS CONTINUOUS PRN
Status: DISCONTINUED | OUTPATIENT
Start: 2019-07-11 | End: 2019-07-16 | Stop reason: HOSPADM

## 2019-07-11 RX ORDER — MIDAZOLAM HYDROCHLORIDE 1 MG/ML
2 INJECTION INTRAMUSCULAR; INTRAVENOUS
Status: DISCONTINUED | OUTPATIENT
Start: 2019-07-11 | End: 2019-07-12

## 2019-07-11 RX ORDER — ROCURONIUM BROMIDE 10 MG/ML
INJECTION, SOLUTION INTRAVENOUS AS NEEDED
Status: DISCONTINUED | OUTPATIENT
Start: 2019-07-11 | End: 2019-07-11 | Stop reason: SURG

## 2019-07-11 RX ORDER — PROMETHAZINE HYDROCHLORIDE 25 MG/1
12.5 TABLET ORAL EVERY 6 HOURS PRN
Status: DISCONTINUED | OUTPATIENT
Start: 2019-07-11 | End: 2019-07-16 | Stop reason: HOSPADM

## 2019-07-11 RX ORDER — MILRINONE LACTATE 0.2 MG/ML
.25-.75 INJECTION, SOLUTION INTRAVENOUS CONTINUOUS PRN
Status: DISCONTINUED | OUTPATIENT
Start: 2019-07-11 | End: 2019-07-12

## 2019-07-11 RX ORDER — PANTOPRAZOLE SODIUM 40 MG/1
40 TABLET, DELAYED RELEASE ORAL
Status: DISCONTINUED | OUTPATIENT
Start: 2019-07-12 | End: 2019-07-16 | Stop reason: HOSPADM

## 2019-07-11 RX ORDER — GLYCOPYRROLATE 0.2 MG/ML
INJECTION INTRAMUSCULAR; INTRAVENOUS AS NEEDED
Status: DISCONTINUED | OUTPATIENT
Start: 2019-07-11 | End: 2019-07-11 | Stop reason: SURG

## 2019-07-11 RX ORDER — NITROGLYCERIN 20 MG/100ML
INJECTION INTRAVENOUS CONTINUOUS PRN
Status: DISCONTINUED | OUTPATIENT
Start: 2019-07-11 | End: 2019-07-11 | Stop reason: SURG

## 2019-07-11 RX ORDER — BISACODYL 10 MG
10 SUPPOSITORY, RECTAL RECTAL DAILY PRN
Status: DISCONTINUED | OUTPATIENT
Start: 2019-07-12 | End: 2019-07-16 | Stop reason: HOSPADM

## 2019-07-11 RX ORDER — SODIUM CHLORIDE 9 MG/ML
30 INJECTION, SOLUTION INTRAVENOUS CONTINUOUS
Status: DISCONTINUED | OUTPATIENT
Start: 2019-07-11 | End: 2019-07-16 | Stop reason: HOSPADM

## 2019-07-11 RX ORDER — CEFAZOLIN SODIUM 2 G/100ML
2 INJECTION, SOLUTION INTRAVENOUS EVERY 8 HOURS
Status: COMPLETED | OUTPATIENT
Start: 2019-07-11 | End: 2019-07-13

## 2019-07-11 RX ORDER — ATORVASTATIN CALCIUM 20 MG/1
40 TABLET, FILM COATED ORAL NIGHTLY
Status: DISCONTINUED | OUTPATIENT
Start: 2019-07-11 | End: 2019-07-16 | Stop reason: HOSPADM

## 2019-07-11 RX ORDER — MAGNESIUM SULFATE HEPTAHYDRATE 500 MG/ML
INJECTION, SOLUTION INTRAMUSCULAR; INTRAVENOUS AS NEEDED
Status: DISCONTINUED | OUTPATIENT
Start: 2019-07-11 | End: 2019-07-11 | Stop reason: SURG

## 2019-07-11 RX ORDER — DOPAMINE HYDROCHLORIDE 160 MG/100ML
2-20 INJECTION, SOLUTION INTRAVENOUS CONTINUOUS PRN
Status: DISCONTINUED | OUTPATIENT
Start: 2019-07-11 | End: 2019-07-12

## 2019-07-11 RX ORDER — HYDROCODONE BITARTRATE AND ACETAMINOPHEN 5; 325 MG/1; MG/1
2 TABLET ORAL EVERY 4 HOURS PRN
Status: DISCONTINUED | OUTPATIENT
Start: 2019-07-11 | End: 2019-07-16 | Stop reason: HOSPADM

## 2019-07-11 RX ORDER — SODIUM CHLORIDE 0.9 % (FLUSH) 0.9 %
3-10 SYRINGE (ML) INJECTION AS NEEDED
Status: DISCONTINUED | OUTPATIENT
Start: 2019-07-11 | End: 2019-07-11 | Stop reason: HOSPADM

## 2019-07-11 RX ORDER — LIDOCAINE HYDROCHLORIDE 40 MG/ML
SOLUTION TOPICAL AS NEEDED
Status: DISCONTINUED | OUTPATIENT
Start: 2019-07-11 | End: 2019-07-11 | Stop reason: SURG

## 2019-07-11 RX ORDER — ALBUMIN, HUMAN INJ 5% 5 %
1500 SOLUTION INTRAVENOUS AS NEEDED
Status: DISPENSED | OUTPATIENT
Start: 2019-07-11 | End: 2019-07-12

## 2019-07-11 RX ORDER — ALPRAZOLAM 0.25 MG/1
0.25 TABLET ORAL EVERY 8 HOURS PRN
Status: DISCONTINUED | OUTPATIENT
Start: 2019-07-11 | End: 2019-07-16 | Stop reason: HOSPADM

## 2019-07-11 RX ORDER — ONDANSETRON 2 MG/ML
INJECTION INTRAMUSCULAR; INTRAVENOUS AS NEEDED
Status: DISCONTINUED | OUTPATIENT
Start: 2019-07-11 | End: 2019-07-11 | Stop reason: SURG

## 2019-07-11 RX ORDER — CEFAZOLIN SODIUM 2 G/100ML
2 INJECTION, SOLUTION INTRAVENOUS
Status: COMPLETED | OUTPATIENT
Start: 2019-07-11 | End: 2019-07-11

## 2019-07-11 RX ORDER — ASPIRIN 81 MG/1
81 TABLET ORAL DAILY
Status: DISCONTINUED | OUTPATIENT
Start: 2019-07-12 | End: 2019-07-16 | Stop reason: HOSPADM

## 2019-07-11 RX ORDER — SODIUM CHLORIDE 9 MG/ML
INJECTION, SOLUTION INTRAVENOUS CONTINUOUS PRN
Status: DISCONTINUED | OUTPATIENT
Start: 2019-07-11 | End: 2019-07-11 | Stop reason: SURG

## 2019-07-11 RX ORDER — MEPERIDINE HYDROCHLORIDE 25 MG/ML
25 INJECTION INTRAMUSCULAR; INTRAVENOUS; SUBCUTANEOUS EVERY 4 HOURS PRN
Status: DISCONTINUED | OUTPATIENT
Start: 2019-07-11 | End: 2019-07-12

## 2019-07-11 RX ORDER — FAMOTIDINE 10 MG/ML
20 INJECTION, SOLUTION INTRAVENOUS
Status: COMPLETED | OUTPATIENT
Start: 2019-07-11 | End: 2019-07-11

## 2019-07-11 RX ORDER — PROPOFOL 10 MG/ML
VIAL (ML) INTRAVENOUS AS NEEDED
Status: DISCONTINUED | OUTPATIENT
Start: 2019-07-11 | End: 2019-07-11 | Stop reason: SURG

## 2019-07-11 RX ORDER — FUROSEMIDE 10 MG/ML
40 INJECTION INTRAMUSCULAR; INTRAVENOUS EVERY 6 HOURS PRN
Status: DISCONTINUED | OUTPATIENT
Start: 2019-07-11 | End: 2019-07-16 | Stop reason: HOSPADM

## 2019-07-11 RX ORDER — VECURONIUM BROMIDE 1 MG/ML
INJECTION, POWDER, LYOPHILIZED, FOR SOLUTION INTRAVENOUS AS NEEDED
Status: DISCONTINUED | OUTPATIENT
Start: 2019-07-11 | End: 2019-07-11 | Stop reason: SURG

## 2019-07-11 RX ORDER — FENTANYL CITRATE 50 UG/ML
25 INJECTION, SOLUTION INTRAMUSCULAR; INTRAVENOUS
Status: DISCONTINUED | OUTPATIENT
Start: 2019-07-11 | End: 2019-07-11 | Stop reason: HOSPADM

## 2019-07-11 RX ORDER — PROPOFOL 10 MG/ML
VIAL (ML) INTRAVENOUS CONTINUOUS PRN
Status: DISCONTINUED | OUTPATIENT
Start: 2019-07-11 | End: 2019-07-11 | Stop reason: SURG

## 2019-07-11 RX ORDER — HEPARIN SODIUM 5000 [USP'U]/ML
INJECTION, SOLUTION INTRAVENOUS; SUBCUTANEOUS AS NEEDED
Status: DISCONTINUED | OUTPATIENT
Start: 2019-07-11 | End: 2019-07-11 | Stop reason: HOSPADM

## 2019-07-11 RX ORDER — PROMETHAZINE HYDROCHLORIDE 25 MG/ML
12.5 INJECTION, SOLUTION INTRAMUSCULAR; INTRAVENOUS EVERY 6 HOURS PRN
Status: DISCONTINUED | OUTPATIENT
Start: 2019-07-11 | End: 2019-07-16 | Stop reason: HOSPADM

## 2019-07-11 RX ORDER — POTASSIUM CHLORIDE 1.5 G/1.77G
40 POWDER, FOR SOLUTION ORAL AS NEEDED
Status: DISCONTINUED | OUTPATIENT
Start: 2019-07-11 | End: 2019-07-16 | Stop reason: HOSPADM

## 2019-07-11 RX ORDER — OXYCODONE HYDROCHLORIDE 5 MG/1
10 TABLET ORAL EVERY 4 HOURS PRN
Status: DISCONTINUED | OUTPATIENT
Start: 2019-07-11 | End: 2019-07-16 | Stop reason: HOSPADM

## 2019-07-11 RX ADMIN — HEPARIN SODIUM 28000 UNITS: 1000 INJECTION, SOLUTION INTRAVENOUS; SUBCUTANEOUS at 08:46

## 2019-07-11 RX ADMIN — EPHEDRINE SULFATE 10 MG: 50 INJECTION INTRAMUSCULAR; INTRAVENOUS; SUBCUTANEOUS at 08:23

## 2019-07-11 RX ADMIN — Medication 1 MG: at 07:06

## 2019-07-11 RX ADMIN — CHLORHEXIDINE GLUCONATE 15 ML: 1.2 RINSE ORAL at 18:43

## 2019-07-11 RX ADMIN — PROPOFOL 30 MCG/KG/MIN: 10 INJECTION, EMULSION INTRAVENOUS at 19:51

## 2019-07-11 RX ADMIN — PROPOFOL 120 MG: 10 INJECTION, EMULSION INTRAVENOUS at 07:06

## 2019-07-11 RX ADMIN — SUFENTANIL CITRATE 50 MCG: 50 INJECTION, SOLUTION EPIDURAL; INTRAVENOUS at 11:34

## 2019-07-11 RX ADMIN — PHENYLEPHRINE HYDROCHLORIDE 50 MCG: 10 INJECTION INTRAVENOUS at 09:07

## 2019-07-11 RX ADMIN — PHENYLEPHRINE HYDROCHLORIDE 1 MCG/KG/MIN: 10 INJECTION, SOLUTION INTRAMUSCULAR; INTRAVENOUS; SUBCUTANEOUS at 09:47

## 2019-07-11 RX ADMIN — SODIUM CHLORIDE 3 UNITS/HR: 900 INJECTION, SOLUTION INTRAVENOUS at 10:59

## 2019-07-11 RX ADMIN — NITROGLYCERIN 100 MCG: 5 INJECTION, SOLUTION INTRAVENOUS at 09:02

## 2019-07-11 RX ADMIN — NITROGLYCERIN 100 MCG: 5 INJECTION, SOLUTION INTRAVENOUS at 08:27

## 2019-07-11 RX ADMIN — SUFENTANIL CITRATE 50 MCG: 50 INJECTION, SOLUTION EPIDURAL; INTRAVENOUS at 07:57

## 2019-07-11 RX ADMIN — EPHEDRINE SULFATE 10 MG: 50 INJECTION INTRAMUSCULAR; INTRAVENOUS; SUBCUTANEOUS at 07:44

## 2019-07-11 RX ADMIN — NITROGLYCERIN 100 MCG: 5 INJECTION, SOLUTION INTRAVENOUS at 07:58

## 2019-07-11 RX ADMIN — VECURONIUM BROMIDE 3 MG: 1 INJECTION, POWDER, LYOPHILIZED, FOR SOLUTION INTRAVENOUS at 08:50

## 2019-07-11 RX ADMIN — LIDOCAINE HYDROCHLORIDE 1 EACH: 40 SOLUTION TOPICAL at 07:10

## 2019-07-11 RX ADMIN — AMINOCAPROIC ACID 10 G: 250 INJECTION, SOLUTION INTRAVENOUS at 11:29

## 2019-07-11 RX ADMIN — FAMOTIDINE 20 MG: 10 INJECTION INTRAVENOUS at 05:53

## 2019-07-11 RX ADMIN — NITROGLYCERIN 100 MCG: 5 INJECTION, SOLUTION INTRAVENOUS at 08:53

## 2019-07-11 RX ADMIN — SODIUM CHLORIDE 30 ML/HR: 9 INJECTION, SOLUTION INTRAVENOUS at 13:10

## 2019-07-11 RX ADMIN — METOCLOPRAMIDE 10 MG: 5 INJECTION, SOLUTION INTRAMUSCULAR; INTRAVENOUS at 13:58

## 2019-07-11 RX ADMIN — AMINOCAPROIC ACID 10 G: 250 INJECTION, SOLUTION INTRAVENOUS at 07:39

## 2019-07-11 RX ADMIN — PROTAMINE SULFATE 300 MG: 10 INJECTION, SOLUTION INTRAVENOUS at 11:20

## 2019-07-11 RX ADMIN — SUFENTANIL CITRATE 50 MCG: 50 INJECTION, SOLUTION EPIDURAL; INTRAVENOUS at 07:49

## 2019-07-11 RX ADMIN — ALBUMIN HUMAN 500 ML: 0.05 INJECTION, SOLUTION INTRAVENOUS at 15:02

## 2019-07-11 RX ADMIN — PROPOFOL 50 MCG/KG/MIN: 10 INJECTION, EMULSION INTRAVENOUS at 08:47

## 2019-07-11 RX ADMIN — ROCURONIUM BROMIDE 50 MG: 10 INJECTION INTRAVENOUS at 11:00

## 2019-07-11 RX ADMIN — NICARDIPINE HYDROCHLORIDE 0.2 MG: 25 INJECTION INTRAVENOUS at 11:24

## 2019-07-11 RX ADMIN — Medication 2 MG: at 06:48

## 2019-07-11 RX ADMIN — CEFAZOLIN SODIUM 2 G: 2 INJECTION, SOLUTION INTRAVENOUS at 07:25

## 2019-07-11 RX ADMIN — DEXMEDETOMIDINE HYDROCHLORIDE 1 MCG/KG/HR: 100 INJECTION, SOLUTION, CONCENTRATE INTRAVENOUS at 20:29

## 2019-07-11 RX ADMIN — POTASSIUM CHLORIDE 20 MEQ: 29.8 INJECTION, SOLUTION INTRAVENOUS at 13:41

## 2019-07-11 RX ADMIN — PROPOFOL 40 MG: 10 INJECTION, EMULSION INTRAVENOUS at 07:58

## 2019-07-11 RX ADMIN — NITROGLYCERIN 0.25 MCG/KG/MIN: 20 INJECTION INTRAVENOUS at 07:57

## 2019-07-11 RX ADMIN — EPHEDRINE SULFATE 10 MG: 50 INJECTION INTRAMUSCULAR; INTRAVENOUS; SUBCUTANEOUS at 07:53

## 2019-07-11 RX ADMIN — NITROGLYCERIN 100 MCG: 5 INJECTION, SOLUTION INTRAVENOUS at 09:00

## 2019-07-11 RX ADMIN — SODIUM CHLORIDE: 9 INJECTION, SOLUTION INTRAVENOUS at 06:39

## 2019-07-11 RX ADMIN — PROPOFOL 40 MG: 10 INJECTION, EMULSION INTRAVENOUS at 08:04

## 2019-07-11 RX ADMIN — MEPERIDINE HYDROCHLORIDE 25 MG: 25 INJECTION INTRAMUSCULAR; INTRAVENOUS; SUBCUTANEOUS at 16:09

## 2019-07-11 RX ADMIN — NITROGLYCERIN 100 MCG: 5 INJECTION, SOLUTION INTRAVENOUS at 08:04

## 2019-07-11 RX ADMIN — NEOSTIGMINE METHYLSULFATE 5 MG: 5 INJECTION, SOLUTION INTRAMUSCULAR; INTRAVENOUS; SUBCUTANEOUS at 12:50

## 2019-07-11 RX ADMIN — NITROGLYCERIN 100 MCG: 5 INJECTION, SOLUTION INTRAVENOUS at 07:57

## 2019-07-11 RX ADMIN — LIDOCAINE HYDROCHLORIDE 60 MG: 20 INJECTION, SOLUTION INFILTRATION; PERINEURAL at 07:06

## 2019-07-11 RX ADMIN — METOPROLOL TARTRATE 12.5 MG: 25 TABLET ORAL at 05:50

## 2019-07-11 RX ADMIN — PHENYLEPHRINE HYDROCHLORIDE 50 MCG: 10 INJECTION INTRAVENOUS at 09:28

## 2019-07-11 RX ADMIN — NITROGLYCERIN 100 MCG: 5 INJECTION, SOLUTION INTRAVENOUS at 08:01

## 2019-07-11 RX ADMIN — ROCURONIUM BROMIDE 50 MG: 10 INJECTION INTRAVENOUS at 12:10

## 2019-07-11 RX ADMIN — SODIUM CHLORIDE 0.4 MCG/KG/HR: 900 INJECTION, SOLUTION INTRAVENOUS at 07:29

## 2019-07-11 RX ADMIN — MAGNESIUM SULFATE HEPTAHYDRATE 2 G: 500 INJECTION, SOLUTION INTRAMUSCULAR; INTRAVENOUS at 11:00

## 2019-07-11 RX ADMIN — ALBUMIN HUMAN 500 ML: 0.05 INJECTION, SOLUTION INTRAVENOUS at 13:57

## 2019-07-11 RX ADMIN — FENTANYL CITRATE 50 MCG: 50 INJECTION INTRAMUSCULAR; INTRAVENOUS at 06:48

## 2019-07-11 RX ADMIN — VECURONIUM BROMIDE 10 MG: 1 INJECTION, POWDER, LYOPHILIZED, FOR SOLUTION INTRAVENOUS at 07:06

## 2019-07-11 RX ADMIN — CEFAZOLIN SODIUM 2 G: 2 INJECTION, SOLUTION INTRAVENOUS at 18:43

## 2019-07-11 RX ADMIN — EPHEDRINE SULFATE 10 MG: 50 INJECTION INTRAMUSCULAR; INTRAVENOUS; SUBCUTANEOUS at 07:16

## 2019-07-11 RX ADMIN — MIDAZOLAM 2 MG: 1 INJECTION INTRAMUSCULAR; INTRAVENOUS at 05:51

## 2019-07-11 RX ADMIN — SODIUM CHLORIDE 5 MG/HR: 9 INJECTION, SOLUTION INTRAVENOUS at 11:25

## 2019-07-11 RX ADMIN — FENTANYL CITRATE 50 MCG: 50 INJECTION INTRAMUSCULAR; INTRAVENOUS at 07:06

## 2019-07-11 RX ADMIN — SODIUM CHLORIDE 2 UNITS/HR: 900 INJECTION, SOLUTION INTRAVENOUS at 10:44

## 2019-07-11 RX ADMIN — ONDANSETRON 4 MG: 2 INJECTION INTRAMUSCULAR; INTRAVENOUS at 11:16

## 2019-07-11 RX ADMIN — MIDAZOLAM HYDROCHLORIDE 2 MG: 1 INJECTION, SOLUTION INTRAMUSCULAR; INTRAVENOUS at 16:00

## 2019-07-11 RX ADMIN — PROPOFOL 50 MCG/KG/MIN: 10 INJECTION, EMULSION INTRAVENOUS at 15:45

## 2019-07-11 RX ADMIN — NITROGLYCERIN 100 MCG: 5 INJECTION, SOLUTION INTRAVENOUS at 08:56

## 2019-07-11 RX ADMIN — GLYCOPYRROLATE 0.5 MG: 0.2 INJECTION INTRAMUSCULAR; INTRAVENOUS at 12:50

## 2019-07-11 RX ADMIN — VECURONIUM BROMIDE 5 MG: 1 INJECTION, POWDER, LYOPHILIZED, FOR SOLUTION INTRAVENOUS at 09:32

## 2019-07-11 RX ADMIN — VECURONIUM BROMIDE 2 MG: 1 INJECTION, POWDER, LYOPHILIZED, FOR SOLUTION INTRAVENOUS at 08:18

## 2019-07-11 RX ADMIN — CEFAZOLIN SODIUM 2 G: 2 INJECTION, SOLUTION INTRAVENOUS at 11:16

## 2019-07-11 RX ADMIN — NICARDIPINE HYDROCHLORIDE 0.2 MG: 25 INJECTION INTRAVENOUS at 11:22

## 2019-07-11 RX ADMIN — SUFENTANIL CITRATE 50 MCG: 50 INJECTION, SOLUTION EPIDURAL; INTRAVENOUS at 09:02

## 2019-07-11 RX ADMIN — NITROGLYCERIN 100 MCG: 5 INJECTION, SOLUTION INTRAVENOUS at 09:13

## 2019-07-11 RX ADMIN — ALBUMIN HUMAN 250 ML: 0.05 INJECTION, SOLUTION INTRAVENOUS at 19:54

## 2019-07-11 RX ADMIN — MEPERIDINE HYDROCHLORIDE 25 MG: 25 INJECTION INTRAMUSCULAR; INTRAVENOUS; SUBCUTANEOUS at 15:43

## 2019-07-11 RX ADMIN — MUPIROCIN 1 APPLICATION: 20 OINTMENT TOPICAL at 20:04

## 2019-07-11 RX ADMIN — MORPHINE SULFATE 4 MG: 2 INJECTION, SOLUTION INTRAMUSCULAR; INTRAVENOUS at 15:32

## 2019-07-11 RX ADMIN — DEXMEDETOMIDINE HYDROCHLORIDE 1 MCG/KG/HR: 100 INJECTION, SOLUTION, CONCENTRATE INTRAVENOUS at 16:07

## 2019-07-11 NOTE — H&P
H&P Entered on 2019/7/3.    Data reviewed. Pt reviewed.    No significant interval changes.    Will proceed as planned.

## 2019-07-11 NOTE — NURSING NOTE
Pt with multiple episodes of shivering with associated hypotension, elevated peak airway pressures with drop in tidal volumes and SaO2. Demerol x2, Morphine, Versed, precedex gtt increased and propofol gtt restarted.  Still with mild shivering.  MYLES Knott and MYLES Blank at bedside.

## 2019-07-11 NOTE — ANESTHESIA POSTPROCEDURE EVALUATION
Patient: Elijah Montero    Procedure Summary     Date:  07/11/19 Room / Location:  [facility] OR [address] [place] [address] MAIN OR    Anesthesia Start:  0639 Anesthesia Stop:  1254    Procedure:  TYLOR STERNOTOMY CORONARY ARTERY BYPASS GRAFT TIMES 4 USING LEFT INTERNAL MAMMARY ARTERY AND RIGHT GREATER SAPHENOUS VEIN GRAFT PER ENDOSCOPIC VEIN HARVESTING AND PRP (N/A Chest) Diagnosis:       Coronary artery disease involving native coronary artery, angina presence unspecified, unspecified whether native or transplanted heart      (Coronary artery disease involving native coronary artery, angina presence unspecified, unspecified whether native or transplanted heart [I25.10])    Surgeon:  Gael Mccall MD Provider:  Ravinder Rutherford MD    Anesthesia Type:  general ASA Status:  4          Anesthesia Type: general  Last vitals  BP   99/66 (07/11/19 1430)   Temp   36.4 °C (97.6 °F) (07/11/19 0529)   Pulse   79 (07/11/19 1430)   Resp   14 (07/11/19 1245)     SpO2   99 % (07/11/19 1430)     Post Anesthesia Care and Evaluation    Patient location during evaluation: PACU  Patient participation: complete - patient cannot participate  Level of consciousness: obtunded/minimal responses  Pain management: adequate  Airway patency: patent  Anesthetic complications: No anesthetic complications  PONV Status: NA  Cardiovascular status: acceptable  Respiratory status: acceptable, ETT, intubated and ventilator  Hydration status: acceptable

## 2019-07-11 NOTE — OP NOTE
Operative Note    Date of Dictation:   07/11/19.    Date of Procedure:   07/11/19.    Referring Physician:  Guera Perry MD.    Preoperative Diagnosis:  Multivessel CAD with stable angina.    Postoperative Diagnosis:  Same.    Case Status:    Urgent.    Procedure:     1. CABG x 4 (LIMA to LAD, SVG to DIAG1, SVG to OM1, SVG to PDA).  2. EVH of the right leg.  3. TYLOR.    Surgeon:    Gael Mccall MD, PhD.    Assistants:    YENNIFER Briggs.    Anesthesia:    GA with Ravinder Rutherford MD.    STS Data:    The STS risk was calculated and the risks, benefits and alternatives to surgery were discussed with the patient and they requested to proceed with surgery. Miri-operative antibiotics and beta blockers were given within the STS-recommended windows.    Findings:    The saphenous vein was harvested endoscopically form the right leg.  The vein had a diameter of 3.5 mm and was of good quality.    The coronaries had severe atherosclerotic disease.    Post-bypass LV systolic function was good.  Post-bypass RV systolic function was good.    Estimated Blood Loss:   Documented in the anesthesia record    Specimens:     None.    Complications:  None.    CPB Time:     91 min.    Aortic Clamp Time:    76 min.    Operative Details:    The patient was placed supine on the operating table. Standard monitors and lines were placed, anesthesia was given and the patient was intubated. The patient was prepped and draped in a sterile manner. A pre-incision time-out was performed. A median sternotomy was performed using a scalpel on the skin and electrocautery down to the sternum. The sternum was divided in the midline using a vertical oscillating saw. Hemostasis was achieved. The pericardium was exposed and the heart and surface coronary arteries were inspected. The left internal mammary artery was harvested in-situ in a skeletonized fashion and had good flow. Meanwhile, saphenous vein from the leg was harvested endoscopically.  IV heparin was given to maintain an ACT over 400.    A pericardial cradle was created and cannulation sutures were placed in the ascending aorta, right atrium and aortic root. The aorta and right atrium were cannulated for cardiopulmonary bypass and cardioplegia cannulae were placed in the root and coronary sinus. The vein graft was inspected and determined to be of adequate quality for use as a conduit. Cardiopulmonary bypass was initiated. The aorta was cross-clamped. One liter of cold blood cardioplegia was given in an antegrade fashion to achieved diastolic arrest with subsequent doses at appropriate intervals between the construction of the subsequent anastomoses.     The PDA distal target vessel was identified and exposed. It was found to be 1.5 mm in diameter and of adequate quality for bypass. The saphenous vein was reversed and anastomosed in an end-to-side fashion with 7-0 prolene suture. The anastomosis was then checked for water-tightness, sized and cut at the proximal end. Following a stab incision with a #11 blade, a hole was made in the aorta with a 3.5 mm punch. The proximal anastomosis was performed with 6-0 prolene suture.    Next, the OM1 distal target vessel was identified and exposed. It was found to be 1.5 mm in diameter and of adequate quality for bypass. The saphenous vein was reversed and anastomosed in an end-to-side fashion with 7-0 prolene suture. The anastomosis was then checked for water-tightness, sized and cut at the proximal end. Following a stab incision with a #11 blade, a hole was made in the aorta with a 3.5 mm punch. The proximal anastomosis was performed with 6-0 prolene suture.    Next, the DIAG1 distal target vessel was identified and exposed. It was found to be 2 mm in diameter and of adequate quality for bypass. The saphenous vein was reversed and anastomosed in an end-to-side fashion with 7-0 prolene suture. The anastomosis was then checked for water-tightness, sized and  cut at the proximal end. Following a stab incision with a #11 blade, a hole was made in the aorta with a 3.5 mm punch. The proximal anastomosis was performed with 6-0 prolene suture.    Next, the LAD distal target vessel was identified and exposed. It was found to be 2 mm in diameter and of adequate quality for bypass. A slit was fashioned in the pericardium and the LIMA was prepared and anastomosed in an end-to-side fashion with 7-0 prolene suture. The anastomosis was then checked for water-tightness and a bulldog clamp reapplied. Following a dose of warm cardioplegia, the aortic cross-clamp and LIMA bulldog were removed.     All anastomotic sites were inspected for hemostasis. The left pleural space was suctioned and ventilation was resumed. Atrial and ventricular pacing wires were installed. The heart did require defibrillation once but did not require pacing prior to the resumption of a regular atrial rhythm. The patient was weaned from cardiopulmonary bypass. The cardioplegia and venous cannulae were removed. A heparin-reversing dose of protamine was administered and the aortic cannula was removed.    Chest drains were placed in the left pleural and mediastinal spaces. Hemostasis was reconfirmed at all surgical sites. The sternum was sprayed with platelet rich plasma prior to reapproximation with stainless steel wires. The presternal fascia, subcutaneous and dermal layers were reapproximated with running sutures. The wounds were covered with sterile dressings.       Disposition:   Cardiovascular recovery room.           Condition:   Critical but stable.

## 2019-07-11 NOTE — ANESTHESIA PROCEDURE NOTES
Airway  Urgency: elective    Airway not difficult    General Information and Staff    Patient location during procedure: OR  Anesthesiologist: Ravinder Rutherford MD    Indications and Patient Condition  Indications for airway management: airway protection    Preoxygenated: yes  MILS maintained throughout  Mask difficulty assessment: 1 - vent by mask    Final Airway Details  Final airway type: endotracheal airway      Successful airway: ETT  Cuffed: yes   Successful intubation technique: direct laryngoscopy  Endotracheal tube insertion site: oral  Blade: Matty  Blade size: 3  ETT size (mm): 8.0  Cormack-Lehane Classification: grade IIa - partial view of glottis  Placement verified by: chest auscultation and capnometry   Measured from: lips  ETT to lips (cm): 23  Number of attempts at approach: 1

## 2019-07-11 NOTE — ANESTHESIA PROCEDURE NOTES
Central Line      Patient location during procedure: OR  Start time: 7/11/2019 7:18 AM  Stop Time:7/11/2019 7:28 AM  Indications: vascular access  Staff  Anesthesiologist: Ravinder Rutherford MD  Preanesthetic Checklist  Completed: patient identified, site marked, surgical consent, pre-op evaluation, timeout performed, IV checked, risks and benefits discussed and monitors and equipment checked  Central Line Prep  Sterile Tech:cap, gloves, gown, mask and sterile barriers  Prep: chloraprep  Patient monitoring: blood pressure monitoring, continuous pulse oximetry and EKG  Central Line Procedure  Laterality:right  Location:internal jugular  Catheter Type:[place], Cordis and double lumen  Catheter Size:9 Fr  Guidance:landmark technique  Assessment  Post procedure:biopatch applied, line sutured and occlusive dressing applied  Assessement:blood return through all ports, free fluid flow and Aubrey Test  Complications:no  Patient Tolerance:patient tolerated the procedure well with no apparent complications

## 2019-07-11 NOTE — ANESTHESIA PROCEDURE NOTES
Procedure Performed: Emergent/Open-Heart Anesthesia TYLOR     Start Time:        End Time:        General Procedure Information  TYLOR Placed for monitoring purposes only -- This is not a diagnostic TYLOR

## 2019-07-11 NOTE — CONSULTS
[facility]  Cardiology Consult Note    Patient Identification:  Name: Elijah Montero  Age: 63 y.o.  Sex: male  :  1955  MRN: [id number]             Requesting Physician: DR. MAHMOOD    Reason for Consultation / Chief Complaint: management recommendations postop management    History of Present Illness:     63-year-old white male recently underwent diagnostic heart catheterization for symptoms suggestive of significant coronary artery disease, underwent coronary artery bypass graft surgery today, patient in the postop care  Comorbid cardiac risk factors:     Past Medical History:  Past Medical History:   Diagnosis Date   • Chest pain     possibly due to pericarditis   • Coronary artery disease     mild to moderate   • Heart disease    • History of positive PPD     as a child    • History of rotator cuff tear     LEFT    • Hyperlipidemia    • Hypertension      Past Surgical History:  Past Surgical History:   Procedure Laterality Date   • CARDIAC CATHETERIZATION      angioplasty with stent    • CARDIAC CATHETERIZATION  2015   • CARDIAC CATHETERIZATION N/A 7/3/2019    Procedure: Left Heart Cath;  Surgeon: Guera Perry MD;  Location:  [facility] CATH INVASIVE LOCATION;  Service: Cardiology   • CARDIAC CATHETERIZATION N/A 7/3/2019    Procedure: Coronary angiography;  Surgeon: Guera Perry MD;  Location:  [facility] CATH [facility]VASIVE [facility];  Service: Cardiology   • CARDIAC CATHETERIZATION N/A 7/3/2019    Procedure: Left ventriculography;  Surgeon: Guera Perry MD;  Location:  [facility] CATH INVASIVE LOCATION;  Service: Cardiology   • CORONARY STENT PLACEMENT     • SKIN CANCER EXCISION N/A    • TONSILLECTOMY        Allergies:  No Known Allergies  Home Meds:  Medications Prior to Admission   Medication Sig Dispense Refill Last Dose   • B Complex Vitamins (VITAMIN B COMPLEX PO) Take 1 tablet by mouth Every Morning.   2019   • chlorhexidine (PERIDEX) 0.12 %  solution Apply 15 mL to the mouth or throat. As directed pre op   7/11/2019 at 0430   • Chlorhexidine Gluconate Cloth 2 % pads Apply  topically. As directed pre op   7/11/2019 at 0430   • folic acid (FOLVITE) 400 MCG tablet Take 400 mcg by mouth Daily.   7/9/2019   • metoprolol succinate XL (TOPROL-XL) 25 MG 24 hr tablet Take 1 tablet by mouth Daily. (Patient taking differently: Take 25 mg by mouth Every Night.) 30 tablet 11 7/10/2019 at 2000   • mupirocin (BACTROBAN) 2 % nasal ointment into the nostril(s) as directed by provider. As directed pre op   7/11/2019 at 0430   • ramipril (ALTACE) 10 MG capsule Take 1 capsule by mouth Daily. (Patient taking differently: Take 10 mg by mouth Every Night.) 90 capsule 1 7/10/2019 at 2000   • saccharomyces boulardii (FLORASTOR) 250 MG capsule Take 250 mg by mouth Every Morning.   7/8/2019   • simvastatin (ZOCOR) 40 MG tablet Take 40 mg by mouth Every Night.   7/10/2019 at 2000   • vitamin C (ASCORBIC ACID) 250 MG tablet Take 250 mg by mouth Every Morning.   7/10/2019 at 1200n   • aspirin 81 MG EC tablet Take 81 mg by mouth Every Morning.   7/10/2019 at 1600   • nitroglycerin (NITROSTAT) 0.4 MG SL tablet 1 under the tongue as needed for angina, may repeat q5mins for up three doses (Patient taking differently: Place 0.4 mg under the tongue Every 5 (Five) Minutes As Needed for Chest Pain. 1 under the tongue as needed for angina, may repeat q5mins for up three doses) 100 tablet 11 7/4/2019     Current Meds:   [unfilled]  Social History:   Social History     Tobacco Use   • Smoking status: Former Smoker     Packs/day: 1.00     Years: 13.00     Pack years: 13.00     Types: Cigarettes   • Smokeless tobacco: Never Used   • Tobacco comment: QUIT AGE 29   Substance Use Topics   • Alcohol use: Yes     Comment: THREE TIMES WEEK, BEER      Family History:  Family History   Problem Relation Age of Onset   • Other Mother 65        cabg   • Heart disease Father    • Other Father 45        cabg    • Other Sister         BLOOD DISEASE    • Malig Hyperthermia Neg Hx         Review of Systems  Cannot be obtained as the patient is intubated                                                    Constitutional:  Temp:  [97.6 °F (36.4 °C)] 97.6 °F (36.4 °C)  Heart Rate:  [67-90] 79  Resp:  [14-16] 14  BP: ()/(58-85) 110/68  Arterial Line BP: ()/(43-64) 117/58  FiO2 (%):  [40 %-100 %] 40 %    Physical Exam   General: Intubated  Eyes: PERTL,  HEENT:  No JVD. Thyroid not visibly enlarged. No mucosal pallor or cyanosis  Respiratory: Respirations regular and unlabored at rest. BBS with good air entry in all fields. No crackles, rubs or wheezes auscultated  Cardiovascular: S1S2 Regular rate and rhythm. No murmur, rub or gallop auscultated. No carotid bruits. DP/PT pulses    . No pretibial pitting edema  Gastrointestinal: Abdomen soft, flat, non tender. Bowel sounds present. No hepatosplenomegaly. No ascites  Musculoskeletal: GARSIA x4. No abnormal movements  Extremities: No digital clubbing or cyanosis  Skin: Color pink. Skin warm and dry to touch. No rashes  No xanthoma  euro: AAO x3 CN II-XII grossly intact  N            Cardiographics  ECG:     Telemetry:    Echocardiogram:     Imaging  Chest X-ray:     Lab Review       Results from last 7 days   Lab Units 07/11/19  1254   MAGNESIUM mg/dL 2.8*     Results from last 7 days   Lab Units 07/11/19  1254   SODIUM mmol/L 144   POTASSIUM mmol/L 3.9   BUN mg/dL 12   CREATININE mg/dL 0.91   CALCIUM mg/dL 8.2*     @LABRCNTIPbnp@  Results from last 7 days   Lab Units 07/11/19  1254 07/10/19  0758   WBC 10*3/mm3 12.75* 6.44   HEMOGLOBIN g/dL 12.4* 15.5   HEMATOCRIT % 37.7 48.3   PLATELETS 10*3/mm3 92* 179     Results from last 7 days   Lab Units 07/11/19  1254 07/10/19  0758   INR  1.42* 0.94   APTT seconds 29.8 28.8         Assessment:  Post CABG  Hypertension    Recommendations / Plan:   Patient's remains intubated, difficult to arouse, unstable vital signs, will be  continued on supportive care, will repeat EKGs and cardiac enzymes    Postop beta-blockers    Postop pacemaker support    Further recommendations as needed      Guera Perry MD  7/11/2019, 5:12 PM      EMR Dragon/Transcription:   Dictated utilizing Dragon dictation

## 2019-07-11 NOTE — ANESTHESIA PROCEDURE NOTES
Arterial Line      Patient location during procedure: OR  Start time: 7/11/2019 6:53 AM  Stop Time:7/11/2019 6:58 AM       Line placed for hemodynamic monitoring.  Performed By   Anesthesiologist: Ravinder Rutherford MD  Preanesthetic Checklist  Completed: patient identified, site marked, surgical consent, pre-op evaluation, timeout performed, IV checked, risks and benefits discussed and monitors and equipment checked  Arterial Line Prep   Sterile Tech: cap, gloves and mask  Prep: ChloraPrep  Patient monitoring: blood pressure monitoring, continuous pulse oximetry and EKG  Arterial Line Procedure   Laterality:left  Location:  radial artery  Catheter size: 20 G   Guidance: ultrasound guided and palpation technique  Number of attempts: 2  Successful placement: yes  Post Assessment   Dressing Type: biopatch applied, occlusive dressing applied, secured with tape and wrist guard applied.   Complications no  Circ/Move/Sens Assessment: normal and unchanged.   Patient Tolerance: patient tolerated the procedure well with no apparent complications  Additional Notes  Ultrasound needed to find artery/no images obtained

## 2019-07-12 ENCOUNTER — APPOINTMENT (OUTPATIENT)
Dept: GENERAL RADIOLOGY | Facility: HOSPITAL | Age: 64
End: 2019-07-12

## 2019-07-12 LAB
ALBUMIN SERPL-MCNC: 4.4 G/DL (ref 3.5–5.2)
ANION GAP SERPL CALCULATED.3IONS-SCNC: 15.1 MMOL/L (ref 5–15)
BASE EXCESS BLDA CALC-SCNC: 0 MMOL/L (ref -5–5)
BASE EXCESS BLDA CALC-SCNC: 0 MMOL/L (ref -5–5)
BASE EXCESS BLDA CALC-SCNC: 2 MMOL/L (ref -5–5)
BASE EXCESS BLDA CALC-SCNC: 3 MMOL/L (ref -5–5)
BASE EXCESS BLDA CALC-SCNC: 5 MMOL/L (ref -5–5)
BASE EXCESS BLDA CALC-SCNC: 5 MMOL/L (ref -5–5)
BASOPHILS # BLD AUTO: 0.03 10*3/MM3 (ref 0–0.2)
BASOPHILS NFR BLD AUTO: 0.3 % (ref 0–1.5)
BUN BLD-MCNC: 14 MG/DL (ref 8–23)
BUN/CREAT SERPL: 12.5 (ref 7–25)
CALCIUM SPEC-SCNC: 8.2 MG/DL (ref 8.6–10.5)
CHLORIDE SERPL-SCNC: 107 MMOL/L (ref 98–107)
CO2 BLDA-SCNC: 26 MMOL/L (ref 24–29)
CO2 BLDA-SCNC: 27 MMOL/L (ref 24–29)
CO2 BLDA-SCNC: 30 MMOL/L (ref 24–29)
CO2 BLDA-SCNC: 30 MMOL/L (ref 24–29)
CO2 BLDA-SCNC: 31 MMOL/L (ref 24–29)
CO2 BLDA-SCNC: 32 MMOL/L (ref 24–29)
CO2 SERPL-SCNC: 20.9 MMOL/L (ref 22–29)
CREAT BLD-MCNC: 1.12 MG/DL (ref 0.76–1.27)
DEPRECATED RDW RBC AUTO: 46.6 FL (ref 37–54)
EOSINOPHIL # BLD AUTO: 0.02 10*3/MM3 (ref 0–0.4)
EOSINOPHIL NFR BLD AUTO: 0.2 % (ref 0.3–6.2)
ERYTHROCYTE [DISTWIDTH] IN BLOOD BY AUTOMATED COUNT: 13 % (ref 12.3–15.4)
GFR SERPL CREATININE-BSD FRML MDRD: 67 ML/MIN/1.73
GLUCOSE BLD-MCNC: 174 MG/DL (ref 65–99)
GLUCOSE BLDC GLUCOMTR-MCNC: 120 MG/DL (ref 70–130)
GLUCOSE BLDC GLUCOMTR-MCNC: 123 MG/DL (ref 70–130)
GLUCOSE BLDC GLUCOMTR-MCNC: 132 MG/DL (ref 70–130)
GLUCOSE BLDC GLUCOMTR-MCNC: 136 MG/DL (ref 70–130)
GLUCOSE BLDC GLUCOMTR-MCNC: 143 MG/DL (ref 70–130)
GLUCOSE BLDC GLUCOMTR-MCNC: 143 MG/DL (ref 70–130)
GLUCOSE BLDC GLUCOMTR-MCNC: 151 MG/DL (ref 70–130)
GLUCOSE BLDC GLUCOMTR-MCNC: 153 MG/DL (ref 70–130)
GLUCOSE BLDC GLUCOMTR-MCNC: 157 MG/DL (ref 70–130)
GLUCOSE BLDC GLUCOMTR-MCNC: 160 MG/DL (ref 70–130)
GLUCOSE BLDC GLUCOMTR-MCNC: 183 MG/DL (ref 70–130)
HCO3 BLDA-SCNC: 24.7 MMOL/L (ref 22–26)
HCO3 BLDA-SCNC: 25.9 MMOL/L (ref 22–26)
HCO3 BLDA-SCNC: 28.1 MMOL/L (ref 22–26)
HCO3 BLDA-SCNC: 28.3 MMOL/L (ref 22–26)
HCO3 BLDA-SCNC: 29.9 MMOL/L (ref 22–26)
HCO3 BLDA-SCNC: 30.3 MMOL/L (ref 22–26)
HCT VFR BLD AUTO: 32.8 % (ref 37.5–51)
HCT VFR BLDA CALC: 28 % (ref 38–51)
HCT VFR BLDA CALC: 29 % (ref 38–51)
HCT VFR BLDA CALC: 30 % (ref 38–51)
HCT VFR BLDA CALC: 41 % (ref 38–51)
HGB BLD-MCNC: 10.6 G/DL (ref 13–17.7)
HGB BLDA-MCNC: 10.2 G/DL (ref 12–17)
HGB BLDA-MCNC: 13.9 G/DL (ref 12–17)
HGB BLDA-MCNC: 9.5 G/DL (ref 12–17)
HGB BLDA-MCNC: 9.9 G/DL (ref 12–17)
IMM GRANULOCYTES # BLD AUTO: 0.03 10*3/MM3 (ref 0–0.05)
IMM GRANULOCYTES NFR BLD AUTO: 0.3 % (ref 0–0.5)
INR PPP: 1.29 (ref 0.9–1.1)
LYMPHOCYTES # BLD AUTO: 0.49 10*3/MM3 (ref 0.7–3.1)
LYMPHOCYTES NFR BLD AUTO: 5.6 % (ref 19.6–45.3)
MAGNESIUM SERPL-MCNC: 2.3 MG/DL (ref 1.6–2.4)
MCH RBC QN AUTO: 31.8 PG (ref 26.6–33)
MCHC RBC AUTO-ENTMCNC: 32.3 G/DL (ref 31.5–35.7)
MCV RBC AUTO: 98.5 FL (ref 79–97)
MONOCYTES # BLD AUTO: 0.88 10*3/MM3 (ref 0.1–0.9)
MONOCYTES NFR BLD AUTO: 10 % (ref 5–12)
NEUTROPHILS # BLD AUTO: 7.31 10*3/MM3 (ref 1.7–7)
NEUTROPHILS NFR BLD AUTO: 83.6 % (ref 42.7–76)
NRBC BLD AUTO-RTO: 0 /100 WBC (ref 0–0.2)
PCO2 BLDA: 40.6 MM HG (ref 35–45)
PCO2 BLDA: 46.8 MM HG (ref 35–45)
PCO2 BLDA: 50.5 MM HG (ref 35–45)
PCO2 BLDA: 50.6 MM HG (ref 35–45)
PCO2 BLDA: 51 MM HG (ref 35–45)
PCO2 BLDA: 51.8 MM HG (ref 35–45)
PH BLDA: 7.34 PH UNITS (ref 7.35–7.6)
PH BLDA: 7.35 PH UNITS (ref 7.35–7.6)
PH BLDA: 7.36 PH UNITS (ref 7.35–7.6)
PH BLDA: 7.38 PH UNITS (ref 7.35–7.6)
PH BLDA: 7.38 PH UNITS (ref 7.35–7.6)
PH BLDA: 7.39 PH UNITS (ref 7.35–7.6)
PHOSPHATE SERPL-MCNC: 3.3 MG/DL (ref 2.5–4.5)
PLATELET # BLD AUTO: 91 10*3/MM3 (ref 140–450)
PMV BLD AUTO: 11.2 FL (ref 6–12)
PO2 BLDA: 134 MMHG (ref 80–105)
PO2 BLDA: 213 MMHG (ref 80–105)
PO2 BLDA: 332 MMHG (ref 80–105)
PO2 BLDA: 40 MMHG (ref 80–105)
PO2 BLDA: 403 MMHG (ref 80–105)
PO2 BLDA: 403 MMHG (ref 80–105)
POTASSIUM BLD-SCNC: 4.3 MMOL/L (ref 3.5–5.2)
POTASSIUM BLDA-SCNC: 3.9 MMOL/L (ref 3.5–4.9)
POTASSIUM BLDA-SCNC: 4.1 MMOL/L (ref 3.5–4.9)
POTASSIUM BLDA-SCNC: 4.2 MMOL/L (ref 3.5–4.9)
POTASSIUM BLDA-SCNC: 4.6 MMOL/L (ref 3.5–4.9)
POTASSIUM BLDA-SCNC: 4.7 MMOL/L (ref 3.5–4.9)
POTASSIUM BLDA-SCNC: 4.7 MMOL/L (ref 3.5–4.9)
PROTHROMBIN TIME: 15.8 SECONDS (ref 11.7–14.2)
RBC # BLD AUTO: 3.33 10*6/MM3 (ref 4.14–5.8)
SAO2 % BLDA: 100 % (ref 95–98)
SAO2 % BLDA: 72 % (ref 95–98)
SAO2 % BLDA: 99 % (ref 95–98)
SODIUM BLD-SCNC: 143 MMOL/L (ref 136–145)
WBC NRBC COR # BLD: 8.76 10*3/MM3 (ref 3.4–10.8)

## 2019-07-12 PROCEDURE — 97110 THERAPEUTIC EXERCISES: CPT

## 2019-07-12 PROCEDURE — 25010000002 ALBUMIN HUMAN 5% PER 50 ML: Performed by: THORACIC SURGERY (CARDIOTHORACIC VASCULAR SURGERY)

## 2019-07-12 PROCEDURE — 86901 BLOOD TYPING SEROLOGIC RH(D): CPT

## 2019-07-12 PROCEDURE — 63710000001 INSULIN LISPRO (HUMAN) PER 5 UNITS: Performed by: THORACIC SURGERY (CARDIOTHORACIC VASCULAR SURGERY)

## 2019-07-12 PROCEDURE — 25010000002 METOCLOPRAMIDE PER 10 MG: Performed by: THORACIC SURGERY (CARDIOTHORACIC VASCULAR SURGERY)

## 2019-07-12 PROCEDURE — 25010000003 CEFAZOLIN IN DEXTROSE 2-4 GM/100ML-% SOLUTION: Performed by: THORACIC SURGERY (CARDIOTHORACIC VASCULAR SURGERY)

## 2019-07-12 PROCEDURE — 97162 PT EVAL MOD COMPLEX 30 MIN: CPT

## 2019-07-12 PROCEDURE — 93010 ELECTROCARDIOGRAM REPORT: CPT | Performed by: INTERNAL MEDICINE

## 2019-07-12 PROCEDURE — 93005 ELECTROCARDIOGRAM TRACING: CPT | Performed by: THORACIC SURGERY (CARDIOTHORACIC VASCULAR SURGERY)

## 2019-07-12 PROCEDURE — 99232 SBSQ HOSP IP/OBS MODERATE 35: CPT | Performed by: INTERNAL MEDICINE

## 2019-07-12 PROCEDURE — 85610 PROTHROMBIN TIME: CPT | Performed by: THORACIC SURGERY (CARDIOTHORACIC VASCULAR SURGERY)

## 2019-07-12 PROCEDURE — 83735 ASSAY OF MAGNESIUM: CPT | Performed by: THORACIC SURGERY (CARDIOTHORACIC VASCULAR SURGERY)

## 2019-07-12 PROCEDURE — 71045 X-RAY EXAM CHEST 1 VIEW: CPT

## 2019-07-12 PROCEDURE — 86900 BLOOD TYPING SEROLOGIC ABO: CPT

## 2019-07-12 PROCEDURE — 82962 GLUCOSE BLOOD TEST: CPT

## 2019-07-12 PROCEDURE — 80069 RENAL FUNCTION PANEL: CPT | Performed by: THORACIC SURGERY (CARDIOTHORACIC VASCULAR SURGERY)

## 2019-07-12 PROCEDURE — 25010000002 MORPHINE PER 10 MG: Performed by: THORACIC SURGERY (CARDIOTHORACIC VASCULAR SURGERY)

## 2019-07-12 PROCEDURE — 25010000002 FUROSEMIDE PER 20 MG: Performed by: NURSE PRACTITIONER

## 2019-07-12 PROCEDURE — 85025 COMPLETE CBC W/AUTO DIFF WBC: CPT | Performed by: THORACIC SURGERY (CARDIOTHORACIC VASCULAR SURGERY)

## 2019-07-12 PROCEDURE — P9041 ALBUMIN (HUMAN),5%, 50ML: HCPCS | Performed by: THORACIC SURGERY (CARDIOTHORACIC VASCULAR SURGERY)

## 2019-07-12 RX ORDER — NICOTINE POLACRILEX 4 MG
15 LOZENGE BUCCAL
Status: DISCONTINUED | OUTPATIENT
Start: 2019-07-12 | End: 2019-07-16 | Stop reason: HOSPADM

## 2019-07-12 RX ORDER — AMIODARONE HYDROCHLORIDE 200 MG/1
400 TABLET ORAL EVERY 12 HOURS SCHEDULED
Status: DISCONTINUED | OUTPATIENT
Start: 2019-07-12 | End: 2019-07-16

## 2019-07-12 RX ORDER — POTASSIUM CHLORIDE 750 MG/1
20 CAPSULE, EXTENDED RELEASE ORAL DAILY
Status: DISCONTINUED | OUTPATIENT
Start: 2019-07-12 | End: 2019-07-13

## 2019-07-12 RX ORDER — FUROSEMIDE 10 MG/ML
40 INJECTION INTRAMUSCULAR; INTRAVENOUS ONCE
Status: COMPLETED | OUTPATIENT
Start: 2019-07-12 | End: 2019-07-12

## 2019-07-12 RX ORDER — DEXTROSE MONOHYDRATE 25 G/50ML
25 INJECTION, SOLUTION INTRAVENOUS
Status: DISCONTINUED | OUTPATIENT
Start: 2019-07-12 | End: 2019-07-16 | Stop reason: HOSPADM

## 2019-07-12 RX ADMIN — CHLORHEXIDINE GLUCONATE 15 ML: 1.2 RINSE ORAL at 17:31

## 2019-07-12 RX ADMIN — MUPIROCIN 1 APPLICATION: 20 OINTMENT TOPICAL at 20:11

## 2019-07-12 RX ADMIN — ASPIRIN 81 MG: 81 TABLET, COATED ORAL at 08:24

## 2019-07-12 RX ADMIN — METOCLOPRAMIDE 10 MG: 5 INJECTION, SOLUTION INTRAMUSCULAR; INTRAVENOUS at 02:07

## 2019-07-12 RX ADMIN — OXYCODONE HYDROCHLORIDE 10 MG: 5 TABLET ORAL at 14:26

## 2019-07-12 RX ADMIN — OXYCODONE HYDROCHLORIDE 10 MG: 5 TABLET ORAL at 20:11

## 2019-07-12 RX ADMIN — INSULIN LISPRO 2 UNITS: 100 INJECTION, SOLUTION INTRAVENOUS; SUBCUTANEOUS at 12:30

## 2019-07-12 RX ADMIN — METOPROLOL TARTRATE 25 MG: 25 TABLET ORAL at 20:11

## 2019-07-12 RX ADMIN — CEFAZOLIN SODIUM 2 G: 2 INJECTION, SOLUTION INTRAVENOUS at 04:07

## 2019-07-12 RX ADMIN — CEFAZOLIN SODIUM 2 G: 2 INJECTION, SOLUTION INTRAVENOUS at 12:31

## 2019-07-12 RX ADMIN — SENNOSIDES,DOCUSATE SODIUM 2 TABLET: 50; 8.6 TABLET, FILM COATED ORAL at 20:11

## 2019-07-12 RX ADMIN — CHLORHEXIDINE GLUCONATE 15 ML: 1.2 RINSE ORAL at 05:19

## 2019-07-12 RX ADMIN — PANTOPRAZOLE SODIUM 40 MG: 40 TABLET, DELAYED RELEASE ORAL at 05:56

## 2019-07-12 RX ADMIN — CYCLOBENZAPRINE 10 MG: 10 TABLET, FILM COATED ORAL at 17:32

## 2019-07-12 RX ADMIN — MUPIROCIN 1 APPLICATION: 20 OINTMENT TOPICAL at 08:27

## 2019-07-12 RX ADMIN — AMIODARONE HYDROCHLORIDE 400 MG: 200 TABLET ORAL at 09:05

## 2019-07-12 RX ADMIN — METOPROLOL TARTRATE 12.5 MG: 25 TABLET ORAL at 08:26

## 2019-07-12 RX ADMIN — METOCLOPRAMIDE 10 MG: 5 INJECTION, SOLUTION INTRAMUSCULAR; INTRAVENOUS at 06:33

## 2019-07-12 RX ADMIN — HYDROCODONE BITARTRATE AND ACETAMINOPHEN 2 TABLET: 5; 325 TABLET ORAL at 01:22

## 2019-07-12 RX ADMIN — OXYCODONE HYDROCHLORIDE 10 MG: 5 TABLET ORAL at 10:06

## 2019-07-12 RX ADMIN — OXYCODONE HYDROCHLORIDE 10 MG: 5 TABLET ORAL at 05:56

## 2019-07-12 RX ADMIN — FUROSEMIDE 40 MG: 10 INJECTION, SOLUTION INTRAMUSCULAR; INTRAVENOUS at 09:02

## 2019-07-12 RX ADMIN — HYDROCODONE BITARTRATE AND ACETAMINOPHEN 2 TABLET: 5; 325 TABLET ORAL at 07:59

## 2019-07-12 RX ADMIN — CYCLOBENZAPRINE 10 MG: 10 TABLET, FILM COATED ORAL at 09:15

## 2019-07-12 RX ADMIN — AMIODARONE HYDROCHLORIDE 400 MG: 200 TABLET ORAL at 20:11

## 2019-07-12 RX ADMIN — POTASSIUM CHLORIDE 20 MEQ: 750 CAPSULE, EXTENDED RELEASE ORAL at 09:45

## 2019-07-12 RX ADMIN — HYDROCODONE BITARTRATE AND ACETAMINOPHEN 2 TABLET: 5; 325 TABLET ORAL at 23:52

## 2019-07-12 RX ADMIN — MORPHINE SULFATE 1 MG: 2 INJECTION, SOLUTION INTRAMUSCULAR; INTRAVENOUS at 03:20

## 2019-07-12 RX ADMIN — CEFAZOLIN SODIUM 2 G: 2 INJECTION, SOLUTION INTRAVENOUS at 22:18

## 2019-07-12 RX ADMIN — ALBUMIN HUMAN 250 ML: 0.05 INJECTION, SOLUTION INTRAVENOUS at 04:01

## 2019-07-12 RX ADMIN — METOPROLOL TARTRATE 12.5 MG: 25 TABLET ORAL at 09:44

## 2019-07-12 NOTE — PLAN OF CARE
Problem: Patient Care Overview  Goal: Plan of Care Review   07/12/19 0942   OTHER   Outcome Summary Patient is a63 y.o. male admitted to [facility] for CABGx4 on 7/11/2019. PMHx includes CAD, HLD, HTN. Patient is independent at baseline and lives with his spouse. Patient reports independence with mobility, no stairs to enter home. Today, patient performed bed mobility with modA, required Caitlyn for transfers, and ambulated 75 feet with minAx2. Patient demonstrates impairments consisting of generalized post op weakness and pain, decreased activity tolerance. Patient may benefit from skilled PT services acutely to address functional deficits as well as improve level of independence prior to discharge. Anticipate home with assist upon DC.

## 2019-07-12 NOTE — PROGRESS NOTES
[facility]  Cardiology Progress Note    Patient Identification:  Name: Elijah Montero  Age: 63 y.o.  Sex: male  :  1955  MRN: [id number]                 Follow Up / Chief Complaint: CAD s/p CABG    Interval History: Patient underwent CABG x4 with right EVH on 2019.  Tolerated well.  Vital signs stable, patient was febrile overnight, this is since resolved.       Subjective: Patient is having a fair amount of pain postoperatively.  Denies any chest pain and shortness of breath at this time, other than the difficulty caused by surgical incisional pain.      Objective:    Past Medical History:  Past Medical History:   Diagnosis Date   • Chest pain     possibly due to pericarditis   • Coronary artery disease     mild to moderate   • Heart disease    • History of positive PPD     as a child    • History of rotator cuff tear     LEFT    • Hyperlipidemia    • Hypertension      Past Surgical History:  Past Surgical History:   Procedure Laterality Date   • CARDIAC CATHETERIZATION      angioplasty with stent    • CARDIAC CATHETERIZATION  2015   • CARDIAC CATHETERIZATION N/A 7/3/2019    Procedure: Left Heart Cath;  Surgeon: Guera Perry MD;  Location: [facility] CATH INVASIVE LOCATION;  Service: Cardiology   • CARDIAC CATHETERIZATION N/A 7/3/2019    Procedure: Coronary angiography;  Surgeon: Guera Perry MD;  Location: [facility] CATH INVASIVE LOCATION;  Service: Cardiology   • CARDIAC CATHETERIZATION N/A 7/3/2019    Procedure: Left ventriculography;  Surgeon: Guera Perry MD;  Location: [facility] CATH INVASIVE LOCATION;  Service: Cardiology   • CORONARY ARTERY BYPASS GRAFT N/A 2019    Procedure: TYLOR STERNOTOMY CORONARY ARTERY BYPASS GRAFT TIMES 4 USING LEFT INTERNAL MAMMARY ARTERY AND RIGHT GREATER SAPHENOUS VEIN GRAFT PER ENDOSCOPIC VEIN HARVESTING AND PRP;  Surgeon: Gael Mccall MD;  Location: [facility] MAIN OR;  Service: Cardiothoracic   •  "CORONARY STENT PLACEMENT     • SKIN CANCER EXCISION N/A 2013   • TONSILLECTOMY          Social History:   Social History     Tobacco Use   • Smoking status: Former Smoker     Packs/day: 1.00     Years: 13.00     Pack years: 13.00     Types: Cigarettes   • Smokeless tobacco: Never Used   • Tobacco comment: QUIT AGE 29   Substance Use Topics   • Alcohol use: Yes     Comment: THREE TIMES WEEK, BEER      Family History:  Family History   Problem Relation Age of Onset   • Other Mother 65        cabg   • Heart disease Father    • Other Father 45        cabg   • Other Sister         BLOOD DISEASE    • Malig Hyperthermia Neg Hx           Allergies:  No Known Allergies  Scheduled Meds:    amiodarone 400 mg Q12H   aspirin 81 mg Daily   atorvastatin 40 mg Nightly   ceFAZolin 2 g Q8H   chlorhexidine 15 mL Q12H   enoxaparin 40 mg Q24H   insulin lispro 0-7 Units 4x Daily With Meals & Nightly   metoprolol tartrate 25 mg Q12H   mupirocin 1 application BID   pantoprazole 40 mg Q AM   potassium chloride 20 mEq Daily   sennosides-docusate sodium 2 tablet Nightly       I have reviewed the patient's recent medical history and current medications, as well as personally reviewed/interpreted the telemetry/ECG data.      INTAKE AND OUTPUT:    Intake/Output Summary (Last 24 hours) at 7/12/2019 1626  Last data filed at 7/12/2019 1200  Gross per 24 hour   Intake 2645 ml   Output 1450 ml   Net 1195 ml       Review of Systems:   GI: Denies abdominal pain and nausea vomiting diarrhea  Cardiac: Denies chest pain and palpitations  Pulmonary: Denies shortness of breath and cough    /61   Pulse 69   Temp 98.2 °F (36.8 °C) (Oral)   Resp 16   Ht 175.3 cm (69.02\")   Wt 91.6 kg (202 lb)   SpO2 97%   BMI 29.82 kg/m²   General appearance: No acute changes   Neck: Trachea midline; NECK, supple, no thyromegaly or lymphadenopathy   Lungs: Normal size and shape, normal breath sounds, equal distribution of air, no rales and rhonchi   CV: S1-S2 " regular, no murmurs, no rub, no gallop   Abdomen: Soft, non-tender; no masses , no abnormal abdominal sounds   Extremities: No deformity , normal color , no peripheral edema   Skin: Normal temperature, turgor and texture; no rash, ulcers          Cardiographics  Telemetry:     EC/12 SR rate 70s similar to prior tracing.         7/10 SR rate 60s        Echocardiogram:   19  Interpretation Summary     · Left atrial cavity size is borderline dilated.  · There is calcification of the aortic valve.  · Calculated EF = 60%.  · There is no evidence of pericardial effusion.            Stress test  19  Interpretation Summary        · Findings consistent with an abnormal ECG stress test.  · Left ventricular ejection fraction is normal (Calculated EF = 60%).  · Myocardial perfusion imaging indicates a small-sized, mildly severe area of ischemia located in the inferior wall.  · Impressions are consistent with an intermediate risk study.     Asymptomatic for chest pain.  ECG is positive for ischemia:  2 mm Horizontal ST segment Depression Inferior Leads (Only Lead III at baseline was non-specific T wave abnormality)  Borderline positive Lateral leads with beat to beat variability 1.5-1.5 mm Horizontal ST depression  (Baseline V6 was non-specific ST T wave abnormality)  BP response: hypertensive, Baseline 114/60, Peak 164/70, Recovery 164/70- 128/64  Recovery: 1 min 160/70                   2 min 150/70                   3 min 140/60                   4 min 144/60                   5 min 134/60                   6 min 128/64  Ectopy: Rare PVC with exercise and recovery, and rare PAC with exercise.  Exercise Tolerance: good and  reached 18 Of  20 Urmila Scale, Stage IV.  Unable to reach THR due to Beta-blocker therapy and fatigue. Changed to Walking Lexiscan.            Cardiac catheterization  7/3/19  Coronary Angiography      Left Main:  The left main originates in the left coronary cusp.  It bifurcates and gives  rise to the LAD and circumflex system.  Distal left main 50% stenosis     Left Anterior Descending:  Left anterior descending very proximal 70 to 80% stenosis midportion 50% stenosis, early atherosclerotic plaque of diagonal and  branches     Left Circumflex:  Circumflex artery codominant ostial 70% stenosis     Right Coronary Artery:  The right coronary artery originates in the right coronary cusp.  Right coronary artery proximal 60% stenosis, distally at the bifurcation 90% stenosis extending into the PDA and posterolateral branches     Left Ventriculography:       Estimated Ejection Fraction:         60 %   Wall motion abnormalities:  None   Mitral Regurgitation:  None      Impression:      1. Distal left main 50% stenosis  2. Left anterior descending very proximal 70 to 80% stenosis midportion 50% stenosis, early atherosclerotic plaque of diagonal and  branches  3. Circumflex artery codominant ostial 70% stenosis  4. Right coronary artery proximal 60% stenosis, distally at the bifurcation 90% stenosis extending into the PDA and posterolateral branches  5. Normal LV gram     Recommendations:      1. Will consider coronary artery bypass graft surgery                     Lab Review       Results from last 7 days   Lab Units 07/12/19  0303   MAGNESIUM mg/dL 2.3     Results from last 7 days   Lab Units 07/12/19  0303   SODIUM mmol/L 143   POTASSIUM mmol/L 4.3   BUN mg/dL 14   CREATININE mg/dL 1.12   CALCIUM mg/dL 8.2*       Results from last 7 days   Lab Units 07/12/19  0303 07/11/19  1652 07/11/19  1254   WBC 10*3/mm3 8.76 8.37 12.75*   HEMOGLOBIN g/dL 10.6* 11.2* 12.4*   HEMATOCRIT % 32.8* 33.7* 37.7   PLATELETS 10*3/mm3 91* 83* 92*     Results from last 7 days   Lab Units 07/12/19  0303 07/11/19  1254 07/10/19  0758   INR  1.29* 1.42* 0.94   APTT seconds  --  29.8 28.8     Estimated Creatinine Clearance: 77.9 mL/min (by C-G formula based on SCr of 1.12 mg/dL).    I have reviewed the medical  "decision making with Dr. Perry in detail.       Assessment/plan    1.  CAD  2.  Hypertension  3.  Hyperlipidemia      Patient is now status post CABG x4.  Tolerated well.  On maximum medical therapy.  Blood pressure stable and within normal limits.  Hemoglobin noted to be 10.6.  Encourage I-S use.  Patient still with chest tubes, transferred to telemetry today.  Receiving IV diuretics per CT surgery.  Defer diuresis to CTS.  Creatinine stable, electrolytes within normal limits.  Telemetry sinus rhythm rate 70s.  Encouraged ambulation, deep breathing, incentive spirometry use.          Continue to offer supportive care throughout postoperative recovery, further recommendations as needed.      )7/12/2019  Felipa Lowery, MYLES Valdez/Transcription:   \"Dictated utilizing Dragon dictation\".   "

## 2019-07-12 NOTE — PROGRESS NOTES
Discharge Planning Assessment  [facility]     Patient Name: Elijah Montero  MRN: [id number]  Today's Date: 7/12/2019    Admit Date: 7/11/2019    Discharge Needs Assessment     Row Name 07/12/19 1613       Living Environment    Lives With  spouse    Name(s) of Who Lives With Patient  Kenyatta Montero, spouse    Current Living Arrangements  home/apartment/condo    Primary Care Provided by  self    Provides Primary Care For  pet(s) 2 dogs    Family Caregiver if Needed  spouse    Quality of Family Relationships  helpful;involved;supportive    Able to Return to Prior Arrangements  yes       Resource/Environmental Concerns    Resource/Environmental Concerns  none       Transition Planning    Patient/Family Anticipates Transition to  home with family    Patient/Family Anticipated Services at Transition  home health care    Transportation Anticipated  family or friend will provide       Discharge Needs Assessment    Concerns to be Addressed  discharge planning    Equipment Currently Used at Home  none    Anticipated Changes Related to Illness  none    Equipment Needed After Discharge  none    Discharge Facility/Level of Care Needs  home with home health    Offered/Gave Vendor List  yes    Patient's Choice of Community Agency(s)  Spouse chose [facility]        Discharge Plan     Row Name 07/12/19 0088       Plan    Plan  Home w/ [facility]    Patient/Family in Agreement with Plan  yes    Plan Comments  Spoke with Pt and spouse Kenyatta Montero ([phone]) at bedside.  CCP introduced self and role.  Pt confirmed information on face sheet.  Pt stated he is IADL'S, works full time & drives.  Pt lives in a house with one entrance step.  Pt reports PCP is MYLES Hillman.   Pt confirms pharmacy as [facility].  Pt denies issues with affording his medications.  Pt denies past home health, sub-acute rehab & DME.  Pt plans to return home at discharge with assistance of his wife.  Pt chose [facility] to follow him at d/c.   Referral called to [facility].  CCP will continue to follow…NABIL MILNER/CCP        Destination      No service coordination in this encounter.      Durable Medical Equipment      No service coordination in this encounter.      Dialysis/Infusion      No service coordination in this encounter.      Home Medical Care - Selection Complete      Service Provider Request Status Selected Services Address Phone Number Fax Number    [facility] [address] [address] [phone]      Therapy      No service coordination in this encounter.      Community Resources      No service coordination in this encounter.          Demographic Summary     Row Name 07/12/19 1617       General Information    Admission Type  inpatient    Arrived From  home    Referral Source  admission list;physician    Reason for Consult  discharge planning    Preferred Language  English     Used During This Interaction  no        Functional Status     Row Name 07/12/19 1617       Functional Status    Usual Activity Tolerance  good    Current Activity Tolerance  fair       Functional Status, IADL    Medications  independent    Meal Preparation  independent    Housekeeping  independent    Laundry  independent    Shopping  independent       Mental Status    General Appearance WDL  WDL       Mental Status Summary    Recent Changes in Mental Status/Cognitive Functioning  no changes       Employment/    Employment Status  employed full time    Shift Worked  first shift    Current or Previous Occupation  traveling/driving        Psychosocial    No documentation.       Abuse/Neglect    No documentation.       Legal    No documentation.       Substance Abuse    No documentation.       Patient Forms    No documentation.           Melanie Freeman RN

## 2019-07-12 NOTE — PROGRESS NOTES
LOS: 1 day   Patient Care Team:  Nahomi Rae APRN as PCP - General (Family Medicine)  Guera Perry MD as Consulting Physician (Cardiology)    Chief Complaint:post op    Subjective      Vital Signs  Heart Rate:  [67-90] 90  Resp:  [14-29] 17  BP: ()/(56-76) 96/64  Arterial Line BP: ()/(43-64) 127/62  FiO2 (%):  [40 %-100 %] 40 %  Body mass index is 30.14 kg/m².    Intake/Output Summary (Last 24 hours) at 7/12/2019 0826  Last data filed at 7/12/2019 0600  Gross per 24 hour   Intake 5345 ml   Output 3905 ml   Net 1440 ml     No intake/output data recorded.    Chest tube drainage last 8 hours 120        07/11/19  0529   Weight: 92.6 kg (204 lb 3 oz)         Objective    Results Review:        WBC WBC   Date Value Ref Range Status   07/12/2019 8.76 3.40 - 10.80 10*3/mm3 Final   07/11/2019 8.37 3.40 - 10.80 10*3/mm3 Final   07/11/2019 12.75 (H) 3.40 - 10.80 10*3/mm3 Final   07/10/2019 6.44 3.40 - 10.80 10*3/mm3 Final      HGB Hemoglobin   Date Value Ref Range Status   07/12/2019 10.6 (L) 13.0 - 17.7 g/dL Final   07/11/2019 11.2 (L) 13.0 - 17.7 g/dL Final   07/11/2019 12.4 (L) 13.0 - 17.7 g/dL Final   07/11/2019 9.5 (L) 12.0 - 17.0 g/dL Final   07/11/2019 10.2 (L) 12.0 - 17.0 g/dL Final   07/11/2019 9.9 (L) 12.0 - 17.0 g/dL Final   07/11/2019 10.2 (L) 12.0 - 17.0 g/dL Final   07/11/2019 10.2 (L) 12.0 - 17.0 g/dL Final   07/11/2019 13.9 12.0 - 17.0 g/dL Final   07/10/2019 15.5 13.0 - 17.7 g/dL Final      HCT Hematocrit   Date Value Ref Range Status   07/12/2019 32.8 (L) 37.5 - 51.0 % Final   07/11/2019 33.7 (L) 37.5 - 51.0 % Final   07/11/2019 37.7 37.5 - 51.0 % Final   07/11/2019 28 (L) 38 - 51 % Final   07/11/2019 30 (L) 38 - 51 % Final   07/11/2019 29 (L) 38 - 51 % Final   07/11/2019 30 (L) 38 - 51 % Final   07/11/2019 30 (L) 38 - 51 % Final   07/11/2019 41 38 - 51 % Final   07/10/2019 48.3 37.5 - 51.0 % Final      Platelets Platelets   Date Value Ref Range Status   07/12/2019 91 (L) 140 -  450 10*3/mm3 Final   07/11/2019 83 (L) 140 - 450 10*3/mm3 Final   07/11/2019 92 (L) 140 - 450 10*3/mm3 Final   07/10/2019 179 140 - 450 10*3/mm3 Final        PT/INR:    Protime   Date Value Ref Range Status   07/12/2019 15.8 (H) 11.7 - 14.2 Seconds Final   07/11/2019 17.0 (H) 11.7 - 14.2 Seconds Final   07/10/2019 12.3 11.7 - 14.2 Seconds Final   /  INR   Date Value Ref Range Status   07/12/2019 1.29 (H) 0.90 - 1.10 Final   07/11/2019 1.42 (H) 0.90 - 1.10 Final   07/10/2019 0.94 0.90 - 1.10 Final       Sodium Sodium   Date Value Ref Range Status   07/12/2019 143 136 - 145 mmol/L Final   07/11/2019 146 (H) 136 - 145 mmol/L Final   07/11/2019 144 136 - 145 mmol/L Final   07/10/2019 138 136 - 145 mmol/L Final      Potassium Potassium   Date Value Ref Range Status   07/12/2019 4.3 3.5 - 5.2 mmol/L Final   07/11/2019 4.4 3.5 - 5.2 mmol/L Final   07/11/2019 3.9 3.5 - 5.2 mmol/L Final   07/10/2019 4.7 3.5 - 5.2 mmol/L Final      Chloride Chloride   Date Value Ref Range Status   07/12/2019 107 98 - 107 mmol/L Final   07/11/2019 111 (H) 98 - 107 mmol/L Final   07/11/2019 112 (H) 98 - 107 mmol/L Final   07/10/2019 101 98 - 107 mmol/L Final      Bicarbonate CO2   Date Value Ref Range Status   07/12/2019 20.9 (L) 22.0 - 29.0 mmol/L Final   07/11/2019 22.7 22.0 - 29.0 mmol/L Final   07/11/2019 22.7 22.0 - 29.0 mmol/L Final   07/10/2019 26.7 22.0 - 29.0 mmol/L Final      BUN BUN   Date Value Ref Range Status   07/12/2019 14 8 - 23 mg/dL Final   07/11/2019 11 8 - 23 mg/dL Final   07/11/2019 12 8 - 23 mg/dL Final   07/10/2019 14 8 - 23 mg/dL Final      Creatinine Creatinine   Date Value Ref Range Status   07/12/2019 1.12 0.76 - 1.27 mg/dL Final   07/11/2019 0.98 0.76 - 1.27 mg/dL Final   07/11/2019 0.91 0.76 - 1.27 mg/dL Final   07/10/2019 0.96 0.76 - 1.27 mg/dL Final      Calcium Calcium   Date Value Ref Range Status   07/12/2019 8.2 (L) 8.6 - 10.5 mg/dL Final   07/11/2019 8.0 (L) 8.6 - 10.5 mg/dL Final   07/11/2019 8.2 (L) 8.6 -  10.5 mg/dL Final   07/10/2019 9.5 8.6 - 10.5 mg/dL Final      Magnesium Magnesium   Date Value Ref Range Status   07/12/2019 2.3 1.6 - 2.4 mg/dL Final   07/11/2019 2.4 1.6 - 2.4 mg/dL Final   07/11/2019 2.8 (H) 1.6 - 2.4 mg/dL Final   07/10/2019 2.4 1.6 - 2.4 mg/dL Final            aspirin 81 mg Oral Daily   atorvastatin 40 mg Oral Nightly   ceFAZolin 2 g Intravenous Q8H   chlorhexidine 15 mL Mouth/Throat Q12H   enoxaparin 40 mg Subcutaneous Q24H   magnesium sulfate 1 g Intravenous Q8H   metoclopramide 10 mg Intravenous Q6H   metoprolol tartrate 12.5 mg Oral Q12H   mupirocin 1 application Each Nare BID   pantoprazole 40 mg Oral Q AM   sennosides-docusate sodium 2 tablet Oral Nightly       clevidipine 2-32 mg/hr    dexmedetomidine 0.2-1.5 mcg/kg/hr Last Rate: 0.2 mcg/kg/hr (07/12/19 0015)   DOPamine 2-20 mcg/kg/min    EPINEPHrine 0.02-0.3 mcg/kg/min    insulin 0-50 Units/hr Last Rate: Stopped (07/11/19 1250)   milrinone 0.25-0.75 mcg/kg/min    niCARdipine 5-15 mg/hr Last Rate: Stopped (07/11/19 1250)   nitroglycerin 5-200 mcg/min    norepinephrine 0.02-0.3 mcg/kg/min    phenylephrine 0.2-3 mcg/kg/min Last Rate: 0.1 mcg/kg/min (07/11/19 1528)   propofol 5-50 mcg/kg/min Last Rate: Stopped (07/11/19 2057)   sodium chloride 30 mL/hr Last Rate: 30 mL/hr (07/11/19 1310)   sodium chloride 30 mL/hr    vasopressin 0.02-0.1 Units/min            Patient Active Problem List   Diagnosis Code   • Heart disease I51.9   • Hyperlipidemia E78.5   • Essential hypertension I10   • History of positive PPD R76.11   • Prediabetes R73.03   • Obesity (BMI 30-39.9) E66.9   • Coronary artery disease of native artery of native heart with stable angina pectoris (CMS/HCC) I25.118   • Precordial pain R07.2   • Abnormal cardiac function test R94.30   • Coronary artery disease involving native coronary artery I25.10       Assessment & Plan    -severe CAD- s/p CABGx4 LIMA, EVH of right SVG- POD#1 Tsirigotis  -Hypertension  -Hyperlipidemia  -Post op  anemia, expected acute blood loss    IV diurese.  Mobilize.  Encourage pulmonary toilet.  Leave chest tubes, reassess after ambulation.  Transfer to stepdown.    MYLES Ferraro  07/12/19  8:26 AM

## 2019-07-12 NOTE — CONSULTS
"Met with patient's wife, patient sleeping, discussed benefits of cardiac rehab. Provided phase II information packet, which includes; general information about cardiac rehab, [facility]iac Re[facility] handout and [facility] letter article entitled “Cardiac Rehab is often the Best Medicine for Recovery\", stresses the importance of cardiac rehab after a heart event.   I provided the contact information for cardiac rehab here at [facility] and encouraged them to call when discharged.   Explained if receiving home health would not be able to attend cardiac rehab until finished with home health. Instructed to bring a copy of After Visit Summary to initial assessment.      "

## 2019-07-12 NOTE — DISCHARGE PLACEMENT REQUEST
"Kem Montero (63 y.o. Male)     Date of Birth Social Security Number Address Home Phone MRN    1955  [address] [id number] [phone] [id number]    Mu-ism Marital Status          Samaritan        Admission Date Admission Type Admitting Provider Attending Provider Department, Room/Bed    7/11/19 Elective Gael Mccall MD Tsirigotis, Dimitrios N, MD [facility] CARDIOVASC UNIT, 2223/1    Discharge Date Discharge Disposition Discharge Destination                       Attending Provider:  Gael Mccall MD    Allergies:  No Known Allergies    Isolation:  None   Infection:  None   Code Status:  CPR    Ht:  175.3 cm (69.02\")   Wt:  92.6 kg (204 lb 3 oz)    Admission Cmt:  None   Principal Problem:  Coronary artery disease involving native coronary artery [I25.10] More...                 Active Insurance as of 7/11/2019     Primary Coverage     Payor Plan Insurance Group Employer/Plan Group    [facility] BLUE Critical access hospital [employer] [facility] PPO [id number]     Payor Plan Address Payor Plan Phone Number Payor Plan Fax Number Effective Dates    [address] [phone]  7/21/2016 - None Entered    [facility] [id number]       Subscriber Name Subscriber Birth Date Member ID       KEM MONTERO 1955 [id number]                 Emergency Contacts      (Rel.) Home Phone Work Phone Mobile Phone    Kenyatta Montero (Spouse) [phone] -- --        "

## 2019-07-12 NOTE — THERAPY EVALUATION
Acute Care - Physical Therapy Initial Evaluation  [facility]     Patient Name: Elijah Montero  : 1955  MRN: [id number]  Today's Date: 2019   Onset of Illness/Injury or Date of Surgery: 19            Admit Date: 2019    Visit Dx:     ICD-10-CM ICD-9-CM   1. Impaired mobility Z74.09 799.89   2. Coronary artery disease involving native coronary artery, angina presence unspecified, unspecified whether native or transplanted heart I25.10 414.01     Patient Active Problem List   Diagnosis   • Heart disease   • Hyperlipidemia   • Essential hypertension   • History of positive PPD   • Prediabetes   • Obesity (BMI 30-39.9)   • Coronary artery disease of native artery of native heart with stable angina pectoris (CMS/[facility])   • Precordial pain   • Abnormal cardiac function test   • Coronary artery disease involving native coronary artery     Past Medical History:   Diagnosis Date   • Chest pain     possibly due to pericarditis   • Coronary artery disease     mild to moderate   • Heart disease    • History of positive PPD     as a child    • History of rotator cuff tear     LEFT    • Hyperlipidemia    • Hypertension      Past Surgical History:   Procedure Laterality Date   • CARDIAC CATHETERIZATION      angioplasty with stent    • CARDIAC CATHETERIZATION  2015   • CARDIAC CATHETERIZATION N/A 7/3/2019    Procedure: Left Heart Cath;  Surgeon: Guera Perry MD;  Location: [facility] CATH INVASIVE LOCATION;  Service: Cardiology   • CARDIAC CATHETERIZATION N/A 7/3/2019    Procedure: Coronary angiography;  Surgeon: Guera Perry MD;  Location: [facility] CATH INVASIVE LOCATION;  Service: Cardiology   • CARDIAC CATHETERIZATION N/A 7/3/2019    Procedure: Left ventriculography;  Surgeon: Guera Perry MD;  Location:  [facility] CATH INVASIVE LOCATION;  Service: Cardiology   • CORONARY STENT PLACEMENT     • SKIN CANCER EXCISION N/A    • TONSILLECTOMY          PT ASSESSMENT (last  12 hours)      Physical Therapy Evaluation     Row Name 07/12/19 0936          PT Evaluation Time/Intention    Subjective Information  complains of;weakness;fatigue  -EM     Document Type  evaluation  -EM     Mode of Treatment  physical therapy  -EM     Patient Effort  good  -EM     Row Name 07/12/19 0936          General Information    Onset of Illness/Injury or Date of Surgery  07/11/19  -EM     Patient Observations  alert;cooperative;agree to therapy  -EM     General Observations of Patient   male sitting up in recliner, awake and alert   -EM     Prior Level of Function  independent:;community mobility  -EM     Equipment Currently Used at Home  none  -EM     Pertinent History of Current Functional Problem  CABG x4  -EM     Existing Precautions/Restrictions  cardiac;fall;sternal  -EM     Row Name 07/12/19 0936          Relationship/Environment    Lives With  spouse  -EM     Row Name 07/12/19 0936          Resource/Environmental Concerns    Current Living Arrangements  home/apartment/condo  -EM     Row Name 07/12/19 0936          Cognitive Assessment/Intervention- PT/OT    Orientation Status (Cognition)  oriented x 4  -EM     Follows Commands (Cognition)  WNL  -EM     Row Name 07/12/19 0936          Bed Mobility Assessment/Treatment    Bed Mobility Assessment/Treatment  supine-sit;sit-supine  -EM     Sit-Supine [place] (Bed Mobility)  moderate assist (50% patient effort)  -EM     Assistive Device (Bed Mobility)  head of bed elevated  -EM     Row Name 07/12/19 0936          Transfer Assessment/Treatment    Transfer Assessment/Treatment  sit-stand transfer;stand-sit transfer  -EM     Sit-Stand Towner (Transfers)  minimum assist (75% patient effort);verbal cues  -EM     Stand-Sit Towner (Transfers)  minimum assist (75% patient effort)  -EM     Row Name 07/12/19 0936          Gait/Stairs Assessment/Training    Towner Level (Gait)  minimum assist (75% patient effort);1 person to manage  equipment  -EM     Distance in Feet (Gait)  75  -EM     Deviations/Abnormal Patterns (Gait)  stride length decreased  -EM     Bilateral Gait Deviations  forward flexed posture  -EM     Row Name 07/12/19 0936          General ROM    GENERAL ROM COMMENTS  ROM WNL   -EM     Row Name 07/12/19 0936          MMT (Manual Muscle Testing)    General MMT Comments  no focal deficits identified   -EM     Row Name 07/12/19 0936          Motor Assessment/Intervention    Additional Documentation  Balance (Group);Therapeutic Exercise (Group);Therapeutic Exercise Interventions (Group)  -EM     Row Name 07/12/19 0936          Therapeutic Exercise    Comment (Therapeutic Exercise)  5 reps cardiac exercises, level 3   -EM     Row Name 07/12/19 0936          Balance    Balance  static sitting balance;static standing balance  -EM     Row Name 07/12/19 0936          Static Sitting Balance    Level of [place] (Unsupported Sitting, Static Balance)  supervision  -EM     Sitting Position (Unsupported Sitting, Static Balance)  sitting on edge of bed  -EM     Row Name 07/12/19 0936          Static Standing Balance    Level of [place] (Supported Standing, Static Balance)  contact guard assist  -EM     Row Name 07/12/19 0936          Pain Assessment    Additional Documentation  Pain Scale: Numbers Pre/Post-Treatment (Group)  -EM     Row Name 07/12/19 0936          Pain Scale: Numbers Pre/Post-Treatment    Pain Scale: Numbers, Pretreatment  3/10  -EM     Pain Location  chest  -EM     Pain Intervention(s)  Medication (See MAR)  -EM     Row Name             Wound 07/11/19 1151 chest incision    Wound - Properties Group Date first assessed: 07/11/19  -SR Time first assessed: 1151  -SR Location: chest  -SR Type: incision  -SR    Row Name             Wound 07/11/19 1151 Right leg incision    Wound - Properties Group Date first assessed: 07/11/19  -SR Time first assessed: 1151  -SR Side: Right  -SR Location: leg  -SR Type: incision  -SR    Row  Name 07/12/19 0936          Plan of Care Review    Plan of Care Reviewed With  patient  -EM     Row Name 07/12/19 0936          Physical Therapy Clinical Impression    Patient/Family Goals Statement (PT Clinical Impression)  go home   -EM     Criteria for Skilled Interventions Met (PT Clinical Impression)  yes;treatment indicated  -EM     Impairments Found (describe specific impairments)  gait, locomotion, and balance;aerobic capacity/endurance  -EM     Rehab Potential (PT Clinical Summary)  good, to achieve stated therapy goals  -EM     Row Name 07/12/19 0936          Vital Signs    Pre Systolic BP Rehab  112  -EM     Pre Treatment Diastolic BP  64  -EM     Post Systolic BP Rehab  149  -EM     Post Treatment Diastolic BP  83  -EM     Pretreatment Heart Rate (beats/min)  79  -EM     Posttreatment Heart Rate (beats/min)  86  -EM     Pre SpO2 (%)  96  -EM     O2 Delivery Pre Treatment  supplemental O2  -EM     Post SpO2 (%)  100  -EM     O2 Delivery Post Treatment  supplemental O2  -EM     Row Name 07/12/19 0936          Physical Therapy Goals    Additional Documentation  Cardiac Level Goal Selection (PT) (Group)  -EM     Row Name 07/12/19 0936          Cardiac Level Goal (PT)    Cardiac Level (Cardiac Goal, PT)  Level 5  -EM     Time Frame (Cardiac Goal, PT)  1 week  -EM     Row Name 07/12/19 0936          Positioning and Restraints    Pre-Treatment Position  sitting in chair/recliner  -EM     Post Treatment Position  bed  -EM     In Bed  sitting;with nsg  -EM       User Key  (r) = Recorded By, (t) = Taken By, (c) = Cosigned By    Initials Name Provider Type    EM Tati Edouard PT Physical Therapist    Ruby De La Cruz RN Registered Nurse        Physical Therapy Education     Title: PT OT SLP Therapies (In Progress)     Topic: Physical Therapy (In Progress)     Point: Mobility training (In Progress)     Learning Progress Summary           Patient Acceptance, E, NR by EM at 7/12/2019  9:41 AM                    Point: Home exercise program (In Progress)     Learning Progress Summary           Patient Acceptance, E, NR by EM at 7/12/2019  9:41 AM                               User Key     Initials Effective Dates Name Provider Type Discipline    EM 04/03/18 -  Tati Edouard, PT Physical Therapist PT              PT Recommendation and Plan  Anticipated Discharge Disposition (PT): home with assist  Planned Therapy Interventions (PT Eval): bed mobility training, gait training, home exercise program  Therapy Frequency (PT Clinical Impression): daily  Outcome Summary/Treatment Plan (PT)  Anticipated Discharge Disposition (PT): home with assist  Plan of Care Reviewed With: patient  Outcome Summary: Patient is a63 y.o. male admitted to [facility] for CABGx4 on 7/11/2019. PMHx includes CAD, HLD, HTN. Patient is independent at baseline and lives with his spouse. Patient reports independence with mobility, no stairs to enter home. Today, patient performed bed mobility with modA, required Caitlyn for transfers, and ambulated 75 feet with minAx2.  Patient demonstrates impairments consisting of generalized post op weakness and pain, decreased activity tolerance. Patient may benefit from skilled PT services acutely to address functional deficits as well as improve level of independence prior to discharge. Anticipate home with assist upon DC.  Outcome Measures     Row Name 07/12/19 0900             How much help from another person do you currently need...    Turning from your back to your side while in flat bed without using bedrails?  3  -EM      Moving from lying on back to sitting on the side of a flat bed without bedrails?  2  -EM      Moving to and from a bed to a chair (including a wheelchair)?  3  -EM      Standing up from a chair using your arms (e.g., wheelchair, bedside chair)?  3  -EM      Climbing 3-5 steps with a railing?  2  -EM      To walk in hospital room?  3  -EM      AM-PAC 6 Clicks Score (PT)  16  -EM         Functional  Assessment    Outcome Measure Options  AM-PAC 6 Clicks Basic Mobility (PT)  -EM        User Key  (r) = Recorded By, (t) = Taken By, (c) = Cosigned By    Initials Name Provider Type    Tati Ribeiro PT Physical Therapist         Time Calculation:   PT Charges     Row Name 07/12/19 0943             Time Calculation    Start Time  0835  -EM      Stop Time  0856  -EM      Time Calculation (min)  21 min  -EM      PT Received On  07/12/19  -EM      PT - Next Appointment  07/13/19  -EM      PT Goal Re-Cert Due Date  07/19/19  -EM         Time Calculation- PT    Total Timed Code Minutes- PT  10 minute(s)  -EM        User Key  (r) = Recorded By, (t) = Taken By, (c) = Cosigned By    Initials Name Provider Type    Tati Ribeiro PT Physical Therapist        Therapy Charges for Today     Code Description Service Date Service Provider Modifiers Qty    69189260177 HC PT EVAL MOD COMPLEXITY 2 7/12/2019 Tati Edouard, PT GP 1    11284936685 HC PT THER PROC EA 15 MIN 7/12/2019 Tati Edouard, PT GP 1    70913717631 HC PT THER SUPP EA 15 MIN 7/12/2019 Tati Edouard, PT GP 1          PT G-Codes  Outcome Measure Options: AM-PAC 6 Clicks Basic Mobility (PT)  AM-PAC 6 Clicks Score (PT): 16      aTti Edouard PT  7/12/2019

## 2019-07-12 NOTE — PLAN OF CARE
Problem: Patient Care Overview  Goal: Plan of Care Review  Outcome: Ongoing (interventions implemented as appropriate)   07/12/19 1617 07/12/19 7566   OTHER   Outcome Summary --  Brought over from [facility], POD1 CABGx4 LSVG. L pleural and MS CT's y'd together. Wires connected to PM set to 80,15,0.5, pt is overriding. NSR, vitals stable.   Plan of Care Review   Progress --  improving   Coping/Psychosocial   Plan of Care Reviewed With patient --

## 2019-07-13 ENCOUNTER — APPOINTMENT (OUTPATIENT)
Dept: GENERAL RADIOLOGY | Facility: HOSPITAL | Age: 64
End: 2019-07-13

## 2019-07-13 LAB
ANION GAP SERPL CALCULATED.3IONS-SCNC: 9.5 MMOL/L (ref 5–15)
BUN BLD-MCNC: 14 MG/DL (ref 8–23)
BUN/CREAT SERPL: 15.9 (ref 7–25)
CALCIUM SPEC-SCNC: 9 MG/DL (ref 8.6–10.5)
CHLORIDE SERPL-SCNC: 104 MMOL/L (ref 98–107)
CO2 SERPL-SCNC: 25.5 MMOL/L (ref 22–29)
CREAT BLD-MCNC: 0.88 MG/DL (ref 0.76–1.27)
DEPRECATED RDW RBC AUTO: 50.6 FL (ref 37–54)
ERYTHROCYTE [DISTWIDTH] IN BLOOD BY AUTOMATED COUNT: 13.5 % (ref 12.3–15.4)
GFR SERPL CREATININE-BSD FRML MDRD: 88 ML/MIN/1.73
GLUCOSE BLD-MCNC: 136 MG/DL (ref 65–99)
GLUCOSE BLDC GLUCOMTR-MCNC: 104 MG/DL (ref 70–130)
GLUCOSE BLDC GLUCOMTR-MCNC: 121 MG/DL (ref 70–130)
GLUCOSE BLDC GLUCOMTR-MCNC: 139 MG/DL (ref 70–130)
GLUCOSE BLDC GLUCOMTR-MCNC: 98 MG/DL (ref 70–130)
HCT VFR BLD AUTO: 31.6 % (ref 37.5–51)
HGB BLD-MCNC: 9.9 G/DL (ref 13–17.7)
MCH RBC QN AUTO: 31.9 PG (ref 26.6–33)
MCHC RBC AUTO-ENTMCNC: 31.3 G/DL (ref 31.5–35.7)
MCV RBC AUTO: 101.9 FL (ref 79–97)
PLATELET # BLD AUTO: 86 10*3/MM3 (ref 140–450)
PMV BLD AUTO: 11.2 FL (ref 6–12)
POTASSIUM BLD-SCNC: 4.3 MMOL/L (ref 3.5–5.2)
RBC # BLD AUTO: 3.1 10*6/MM3 (ref 4.14–5.8)
SODIUM BLD-SCNC: 139 MMOL/L (ref 136–145)
WBC NRBC COR # BLD: 10.78 10*3/MM3 (ref 3.4–10.8)

## 2019-07-13 PROCEDURE — 85027 COMPLETE CBC AUTOMATED: CPT | Performed by: THORACIC SURGERY (CARDIOTHORACIC VASCULAR SURGERY)

## 2019-07-13 PROCEDURE — 93010 ELECTROCARDIOGRAM REPORT: CPT | Performed by: INTERNAL MEDICINE

## 2019-07-13 PROCEDURE — 71045 X-RAY EXAM CHEST 1 VIEW: CPT

## 2019-07-13 PROCEDURE — 25010000003 CEFAZOLIN IN DEXTROSE 2-4 GM/100ML-% SOLUTION: Performed by: THORACIC SURGERY (CARDIOTHORACIC VASCULAR SURGERY)

## 2019-07-13 PROCEDURE — 99231 SBSQ HOSP IP/OBS SF/LOW 25: CPT | Performed by: NURSE PRACTITIONER

## 2019-07-13 PROCEDURE — 82962 GLUCOSE BLOOD TEST: CPT

## 2019-07-13 PROCEDURE — 80048 BASIC METABOLIC PNL TOTAL CA: CPT | Performed by: THORACIC SURGERY (CARDIOTHORACIC VASCULAR SURGERY)

## 2019-07-13 PROCEDURE — 97110 THERAPEUTIC EXERCISES: CPT

## 2019-07-13 PROCEDURE — 93005 ELECTROCARDIOGRAM TRACING: CPT | Performed by: THORACIC SURGERY (CARDIOTHORACIC VASCULAR SURGERY)

## 2019-07-13 PROCEDURE — 99024 POSTOP FOLLOW-UP VISIT: CPT | Performed by: THORACIC SURGERY (CARDIOTHORACIC VASCULAR SURGERY)

## 2019-07-13 PROCEDURE — 25010000002 FUROSEMIDE PER 20 MG: Performed by: NURSE PRACTITIONER

## 2019-07-13 RX ORDER — FUROSEMIDE 10 MG/ML
40 INJECTION INTRAMUSCULAR; INTRAVENOUS ONCE
Status: COMPLETED | OUTPATIENT
Start: 2019-07-13 | End: 2019-07-13

## 2019-07-13 RX ORDER — POTASSIUM CHLORIDE 750 MG/1
20 CAPSULE, EXTENDED RELEASE ORAL DAILY
Status: DISCONTINUED | OUTPATIENT
Start: 2019-07-13 | End: 2019-07-16 | Stop reason: HOSPADM

## 2019-07-13 RX ADMIN — HYDROCODONE BITARTRATE AND ACETAMINOPHEN 2 TABLET: 5; 325 TABLET ORAL at 20:07

## 2019-07-13 RX ADMIN — FUROSEMIDE 40 MG: 10 INJECTION, SOLUTION INTRAMUSCULAR; INTRAVENOUS at 11:24

## 2019-07-13 RX ADMIN — METOPROLOL TARTRATE 25 MG: 25 TABLET ORAL at 20:13

## 2019-07-13 RX ADMIN — PANTOPRAZOLE SODIUM 40 MG: 40 TABLET, DELAYED RELEASE ORAL at 05:29

## 2019-07-13 RX ADMIN — POTASSIUM CHLORIDE 20 MEQ: 750 CAPSULE, EXTENDED RELEASE ORAL at 10:41

## 2019-07-13 RX ADMIN — CHLORHEXIDINE GLUCONATE 15 ML: 1.2 RINSE ORAL at 05:29

## 2019-07-13 RX ADMIN — CEFAZOLIN SODIUM 2 G: 2 INJECTION, SOLUTION INTRAVENOUS at 04:12

## 2019-07-13 RX ADMIN — OXYCODONE HYDROCHLORIDE 10 MG: 5 TABLET ORAL at 05:29

## 2019-07-13 RX ADMIN — AMIODARONE HYDROCHLORIDE 400 MG: 200 TABLET ORAL at 10:41

## 2019-07-13 RX ADMIN — MUPIROCIN 1 APPLICATION: 20 OINTMENT TOPICAL at 20:14

## 2019-07-13 RX ADMIN — AMIODARONE HYDROCHLORIDE 400 MG: 200 TABLET ORAL at 20:14

## 2019-07-13 RX ADMIN — MUPIROCIN 1 APPLICATION: 20 OINTMENT TOPICAL at 10:41

## 2019-07-13 RX ADMIN — CYCLOBENZAPRINE 10 MG: 10 TABLET, FILM COATED ORAL at 10:41

## 2019-07-13 RX ADMIN — SENNOSIDES,DOCUSATE SODIUM 2 TABLET: 50; 8.6 TABLET, FILM COATED ORAL at 20:14

## 2019-07-13 RX ADMIN — METOPROLOL TARTRATE 25 MG: 25 TABLET ORAL at 10:41

## 2019-07-13 RX ADMIN — ASPIRIN 81 MG: 81 TABLET, COATED ORAL at 10:41

## 2019-07-13 RX ADMIN — ATORVASTATIN CALCIUM 40 MG: 20 TABLET, FILM COATED ORAL at 20:14

## 2019-07-13 RX ADMIN — HYDROCODONE BITARTRATE AND ACETAMINOPHEN 2 TABLET: 5; 325 TABLET ORAL at 10:41

## 2019-07-13 NOTE — THERAPY TREATMENT NOTE
Acute Care - Physical Therapy Treatment Note  [facility]     Patient Name: Elijah Montero  : 1955  MRN: [id number]  Today's Date: 2019  Onset of Illness/Injury or Date of Surgery: 19          Admit Date: 2019    Visit Dx:    ICD-10-CM ICD-9-CM   1. Impaired mobility Z74.09 799.89   2. Coronary artery disease involving native coronary artery, angina presence unspecified, unspecified whether native or transplanted heart I25.10 414.01     Patient Active Problem List   Diagnosis   • Heart disease   • Hyperlipidemia   • Essential hypertension   • History of positive PPD   • Prediabetes   • Obesity (BMI 30-39.9)   • Coronary artery disease of native artery of native heart with stable angina pectoris (CMS/HCC)   • Precordial pain   • Abnormal cardiac function test   • Coronary artery disease involving native coronary artery       Therapy Treatment    Rehabilitation Treatment Summary     Row Name 19 1040             Treatment Time/Intention    Discipline  physical therapist  -EM      Document Type  therapy note (daily note)  -EM      Subjective Information  complains of;pain  -EM      Mode of Treatment  physical therapy  -EM      Patient/Family Observations  wife at bedside, pt sitting up in chair, awake and alert   -EM      Patient Effort  good  -EM      Existing Precautions/Restrictions  cardiac;fall;sternal  -EM      Recorded by [EM] Tait Edouard, PT 19 1044      Row Name 19 1040             Vital Signs    Pre Systolic BP Rehab  122  -EM      Pre Treatment Diastolic BP  89  -EM      Pretreatment Heart Rate (beats/min)  80  -EM      Intratreatment Heart Rate (beats/min)  96  -EM      Posttreatment Heart Rate (beats/min)  90  -EM      Post SpO2 (%)  98  -EM      O2 Delivery Post Treatment  supplemental O2  -EM      Recorded by [EM] Tati Edouard, PT 19 1044      Row Name 19 1040             Sit-Stand Transfer    Sit-Stand [place] (Transfers)   contact guard  -EM      Recorded by [EM] Tati Edouard, PT 07/13/19 1044      Row Name 07/13/19 1040             Stand-Sit Transfer    Stand-Sit [place] (Transfers)  contact guard  -EM      Recorded by [EM] Tati Edouard, PT 07/13/19 1044      Row Name 07/13/19 1040             Gait/Stairs Assessment/Training    [place] Level (Gait)  contact guard;1 person to manage equipment  -EM      Distance in Feet (Gait)  100  -EM      Deviations/Abnormal Patterns (Gait)  stride length decreased;gait speed decreased  -EM      Comment (Gait/Stairs)  cues for deeper breathing and to not grunt, pt very guarded, holding pillow against chest   -EM      Recorded by [EM] Tati Edouard, PT 07/13/19 1044      Row Name 07/13/19 1040             Therapeutic Exercise    Comment (Therapeutic Exercise)  5 reps cardiac protocol, level 3   -EM      Recorded by [EM] Tati Edouard, PT 07/13/19 1044      Row Name 07/13/19 1040             Positioning and Restraints    Pre-Treatment Position  sitting in chair/recliner  -EM      Post Treatment Position  chair  -EM      In Chair  reclined;call light within reach;with family/caregiver  -EM      Recorded by [EM] Tati Edouard, PT 07/13/19 1044      Row Name 07/13/19 1040             Pain Scale: Numbers Pre/Post-Treatment    Pain Scale: Numbers, Pretreatment  5/10  -EM      Pain Location  chest  -EM      Pain Intervention(s)  Medication (See MAR) notified RN pt requesting pain meds   -EM      Recorded by [EM] Tati Edouard, PT 07/13/19 1044      Row Name                Wound 07/11/19 1151 chest incision    Wound - Properties Group Date first assessed: 07/11/19 [SR] Time first assessed: 1151 [SR] Location: chest [SR] Type: incision [SR] Recorded by:  [SR] Ruby Noe RN 07/11/19 1151    Row Name                Wound 07/11/19 1151 Right leg incision    Wound - Properties Group Date first assessed: 07/11/19 [SR] Time first assessed: 1151 [SR] Side:  Right [SR] Location: leg [SR] Type: incision [SR] Recorded by:  [SR] Ruby Noe RN 07/11/19 1151    Row Name 07/13/19 1040             Outcome Summary/Treatment Plan (PT)    Anticipated Discharge Disposition (PT)  home with assist  -EM      Recorded by [EM] Tati Edouard, PT 07/13/19 1044        User Key  (r) = Recorded By, (t) = Taken By, (c) = Cosigned By    Initials Name Effective Dates Discipline    EM Tati Edouard, PT 04/03/18 -  PT    SR Ruby Noe RN 06/16/16 -  Nurse          Wound 07/11/19 1151 chest incision (Active)   Dressing Appearance dry;intact;no drainage 7/13/2019  8:00 AM   Closure JACLYN 7/13/2019  8:00 AM   Base dressing in place, unable to visualize 7/13/2019  8:00 AM   Drainage Amount none 7/13/2019  8:00 AM   Dressing Care, Wound foam;low-adherent 7/13/2019  8:00 AM       Wound 07/11/19 1151 Right leg incision (Active)   Dressing Appearance dry;intact;no drainage 7/13/2019  8:00 AM   Closure Open to air 7/13/2019  8:00 AM   Base clean;dry 7/13/2019  8:00 AM   Drainage Amount none 7/13/2019  8:00 AM   Dressing Care, Wound open to air 7/13/2019  8:00 AM           Physical Therapy Education     Title: PT OT SLP Therapies (In Progress)     Topic: Physical Therapy (In Progress)     Point: Mobility training (In Progress)     Learning Progress Summary           Patient Acceptance, E, NR by EM at 7/13/2019 10:44 AM    Acceptance, E, NR by EM at 7/12/2019  9:41 AM                   Point: Home exercise program (In Progress)     Learning Progress Summary           Patient Acceptance, E, NR by EM at 7/13/2019 10:44 AM    Acceptance, E, NR by EM at 7/12/2019  9:41 AM                               User Key     Initials Effective Dates Name Provider Type Discipline    EM 04/03/18 -  Tati Edouard, PT Physical Therapist PT                PT Recommendation and Plan  Anticipated Discharge Disposition (PT): home with assist  Planned Therapy Interventions (PT Eval): bed mobility  training, gait training, home exercise program  Therapy Frequency (PT Clinical Impression): daily  Outcome Summary/Treatment Plan (PT)  Anticipated Discharge Disposition (PT): home with assist  Plan of Care Reviewed With: patient  Outcome Summary: patient very guarded during mobility, cues for deep breathing and to take longer steps. Patient able to tolerate increased ambulation distance   Outcome Measures     Row Name 07/13/19 1000 07/12/19 0900          How much help from another person do you currently need...    Turning from your back to your side while in flat bed without using bedrails?  3  -EM  3  -EM     Moving from lying on back to sitting on the side of a flat bed without bedrails?  2  -EM  2  -EM     Moving to and from a bed to a chair (including a wheelchair)?  3  -EM  3  -EM     Standing up from a chair using your arms (e.g., wheelchair, bedside chair)?  3  -EM  3  -EM     Climbing 3-5 steps with a railing?  2  -EM  2  -EM     To walk in hospital room?  3  -EM  3  -EM     AM-PAC 6 Clicks Score (PT)  16  -EM  16  -EM        Functional Assessment    Outcome Measure Options  --  AM-PAC 6 Clicks Basic Mobility (PT)  -EM       User Key  (r) = Recorded By, (t) = Taken By, (c) = Cosigned By    Initials Name Provider Type    Tati Ribeiro PT Physical Therapist         Time Calculation:   PT Charges     Row Name 07/13/19 1045             Time Calculation    Start Time  1000  -EM      Stop Time  1017  -EM      Time Calculation (min)  17 min  -EM      PT Received On  07/13/19  -EM      PT - Next Appointment  07/14/19  -EM         Time Calculation- PT    Total Timed Code Minutes- PT  17 minute(s)  -EM        User Key  (r) = Recorded By, (t) = Taken By, (c) = Cosigned By    Initials Name Provider Type    Tati Ribeiro PT Physical Therapist        Therapy Charges for Today     Code Description Service Date Service Provider Modifiers Qty    50286769593  PT EVAL MOD COMPLEXITY 2 7/12/2019  Tati Edouard, PT GP 1    01157228646 HC PT THER PROC EA 15 MIN 7/12/2019 Tati Edouard, PT GP 1    55537463672 HC PT THER SUPP EA 15 MIN 7/12/2019 Tati Edouard, PT GP 1    52020949603 HC PT THER PROC EA 15 MIN 7/13/2019 Tati Edouard, PT GP 1    73621820305 HC PT THER SUPP EA 15 MIN 7/13/2019 Tati Edouard, PT GP 1          PT G-Codes  Outcome Measure Options: AM-PAC 6 Clicks Basic Mobility (PT)  AM-PAC 6 Clicks Score (PT): 16    Tati Edouard, PT  7/13/2019

## 2019-07-13 NOTE — PROGRESS NOTES
[facility]  Cardiology Progress Note    Patient Identification:  Name: Elijah Montero  Age: 63 y.o.  Sex: male  :  1955  MRN: [id number]                 Follow Up / Chief Complaint: CAD s/p CABG    Interval History: Patient underwent CABG x4  with right EVH on 2019.  Tolerated well.  Vital signs stable.          Subjective:He feels pain is worse today.   Had CT pulled today. RN reports he has been very sleepy today so adjustments in pain meds are underway.   Pulling 250-500 on is.  Family at bedside.  + belching, no flatus.     Past Medical History:  Past Medical History:   Diagnosis Date   • Chest pain     possibly due to pericarditis   • Coronary artery disease     mild to moderate   • Heart disease    • History of positive PPD     as a child    • History of rotator cuff tear     LEFT    • Hyperlipidemia    • Hypertension      Past Surgical History:  Past Surgical History:   Procedure Laterality Date   • CARDIAC CATHETERIZATION      angioplasty with stent    • CARDIAC CATHETERIZATION  2015   • CARDIAC CATHETERIZATION N/A 7/3/2019    Procedure: Left Heart Cath;  Surgeon: Guera Perry MD;  Location: [facility] CATH INVASIVE LOCATION;  Service: Cardiology   • CARDIAC CATHETERIZATION N/A 7/3/2019    Procedure: Coronary angiography;  Surgeon: Guera Perry MD;  Location: [facility] CATH INVASIVE LOCATION;  Service: Cardiology   • CARDIAC CATHETERIZATION N/A 7/3/2019    Procedure: Left ventriculography;  Surgeon: Guera Perry MD;  Location: [facility] CATH INVASIVE LOCATION;  Service: Cardiology   • CORONARY ARTERY BYPASS GRAFT N/A 2019    Procedure: TYLOR STERNOTOMY CORONARY ARTERY BYPASS GRAFT TIMES 4 USING LEFT INTERNAL MAMMARY ARTERY AND RIGHT GREATER SAPHENOUS VEIN GRAFT PER ENDOSCOPIC VEIN HARVESTING AND PRP;  Surgeon: Gael Mccall MD;  Location: [facility] OR;  Service: Cardiothoracic   • CORONARY STENT PLACEMENT     • SKIN CANCER  EXCISION N/A 2013   • TONSILLECTOMY          Social History:   Social History     Tobacco Use   • Smoking status: Former Smoker     Packs/day: 1.00     Years: 13.00     Pack years: 13.00     Types: Cigarettes   • Smokeless tobacco: Never Used   • Tobacco comment: QUIT AGE 29   Substance Use Topics   • Alcohol use: Yes     Comment: THREE TIMES WEEK, BEER      Family History:  Family History   Problem Relation Age of Onset   • Other Mother 65        cabg   • Heart disease Father    • Other Father 45        cabg   • Other Sister         BLOOD DISEASE    • Malig Hyperthermia Neg Hx           Allergies:  No Known Allergies     Scheduled Meds:    amiodarone 400 mg Q12H   aspirin 81 mg Daily   atorvastatin 40 mg Nightly   chlorhexidine 15 mL Q12H   enoxaparin 40 mg Q24H   insulin lispro 0-7 Units 4x Daily With Meals & Nightly   metoprolol tartrate 25 mg Q12H   mupirocin 1 application BID   pantoprazole 40 mg Q AM   potassium chloride 20 mEq Daily   sennosides-docusate sodium 2 tablet Nightly       I have reviewed the patient's recent medical history and current medications, as well as personally reviewed/interpreted the telemetry/ECG data.      INTAKE AND OUTPUT:    Intake/Output Summary (Last 24 hours) at 7/13/2019 1152  Last data filed at 7/13/2019 0651  Gross per 24 hour   Intake 440 ml   Output 1600 ml   Net -1160 ml       Review of Systems:   GI: Denies abdominal pain and nausea, vomiting, diarrhea  Cardiac: Denies chest pain and palpitations  Pulmonary: Denies shortness of breath and cough    Constitutional:  Temp:  [97.9 °F (36.6 °C)-99.6 °F (37.6 °C)] 98.2 °F (36.8 °C)  Heart Rate:  [77-81] 77  Resp:  [16-18] 16  BP: (108-141)/(58-89) 122/89   SpO2:  [95 %-98 %] 95 %    Physical Exam:  General:  Appears in no acute distress; resting in chair, complains of incisional pain but otherwise doing quite well  Eyes: non icteric, no conjunctival drainage  HEENT:  No JVD. Thyroid not visibly enlarged. No mucosal pallor or  cyanosis  Respiratory: Respirations regular and unlabored at rest. BBS with good air entry anteriorly and laterally. No crackles or wheezes auscultated;  Cardiovascular: S1S2 Regular rate and rhythm. No murmur, rub or gallop. No carotid bruits. DP/PT pulses 2+. No pretibial pitting edema  Gastrointestinal: Abdomen soft, round, non tender. Bowel sounds present.  No ascites  Musculoskeletal: GARSIA x4. No abnormal movements  Extremities: No digital clubbing or cyanosis  Skin: Color pink. Skin warm and dry to touch. No rashes    Neuro: AAO x3 CN II-XII grossly intact  Psych: Mood and affect normal, pleasant and cooperative      Cardiographics  Telemetry:     SR       EC/13/19    Apaced.                SR rate 70s similar to prior tracing.         7/10 SR rate 60s        Echocardiogram:   19  Interpretation Summary     · Left atrial cavity size is borderline dilated.  · There is calcification of the aortic valve.  · Calculated EF = 60%.  · There is no evidence of pericardial effusion.            Stress test  19  Interpretation Summary        · Findings consistent with an abnormal ECG stress test.  · Left ventricular ejection fraction is normal (Calculated EF = 60%).  · Myocardial perfusion imaging indicates a small-sized, mildly severe area of ischemia located in the inferior wall.  · Impressions are consistent with an intermediate risk study.     Asymptomatic for chest pain.  ECG is positive for ischemia:  2 mm Horizontal ST segment Depression Inferior Leads (Only Lead III at baseline was non-specific T wave abnormality)  Borderline positive Lateral leads with beat to beat variability 1.5-1.5 mm Horizontal ST depression  (Baseline V6 was non-specific ST T wave abnormality)  BP response: hypertensive, Baseline 114/60, Peak 164/70, Recovery 164/70- 128/64  Recovery: 1 min 160/70                   2 min 150/70                   3 min 140/60                   4 min 144/60                   5 min 134/60                    6 min 128/64  Ectopy: Rare PVC with exercise and recovery, and rare PAC with exercise.  Exercise Tolerance: good and  reached 18 Of  20 Urmial Scale, Stage IV.  Unable to reach THR due to Beta-blocker therapy and fatigue. Changed to Walking Lexiscan.            Cardiac catheterization  7/3/19  Coronary Angiography      Left Main:  The left main originates in the left coronary cusp.  It bifurcates and gives rise to the LAD and circumflex system.  Distal left main 50% stenosis     Left Anterior Descending:  Left anterior descending very proximal 70 to 80% stenosis midportion 50% stenosis, early atherosclerotic plaque of diagonal and  branches     Left Circumflex:  Circumflex artery codominant ostial 70% stenosis     Right Coronary Artery:  The right coronary artery originates in the right coronary cusp.  Right coronary artery proximal 60% stenosis, distally at the bifurcation 90% stenosis extending into the PDA and posterolateral branches     Left Ventriculography:       Estimated Ejection Fraction:         60 %   Wall motion abnormalities:  None   Mitral Regurgitation:  None      Impression:      1. Distal left main 50% stenosis  2. Left anterior descending very proximal 70 to 80% stenosis midportion 50% stenosis, early atherosclerotic plaque of diagonal and  branches  3. Circumflex artery codominant ostial 70% stenosis  4. Right coronary artery proximal 60% stenosis, distally at the bifurcation 90% stenosis extending into the PDA and posterolateral branches  5. Normal LV gram     Recommendations:      1. Will consider coronary artery bypass graft surgery       Lab Review       Results from last 7 days   Lab Units 07/12/19  0303   MAGNESIUM mg/dL 2.3     Results from last 7 days   Lab Units 07/13/19  0343   SODIUM mmol/L 139   POTASSIUM mmol/L 4.3   BUN mg/dL 14   CREATININE mg/dL 0.88   CALCIUM mg/dL 9.0       Results from last 7 days   Lab Units 07/13/19  0343 07/12/19  0303  "07/11/19  1652   WBC 10*3/mm3 10.78 8.76 8.37   HEMOGLOBIN g/dL 9.9* 10.6* 11.2*   HEMATOCRIT % 31.6* 32.8* 33.7*   PLATELETS 10*3/mm3 86* 91* 83*     Results from last 7 days   Lab Units 07/12/19  0303 07/11/19  1254 07/10/19  0758   INR  1.29* 1.42* 0.94   APTT seconds  --  29.8 28.8     Estimated Creatinine Clearance: 100.1 mL/min (by C-G formula based on SCr of 0.88 mg/dL).        Assessment/plan    1.  CAD s/p CAB x 4 on 7/11.  Is on asa, statin, and bb.    2.  Hypertension: adequate control.  Somewhat labile.  Continue BB.   3.  Hyperlipidemia: is on statin  4.  Post op ABL anemia: 7/13 Hgb 9.9 (1.6)       .  Encourage I-S use q 1 to 2 hours.  Poor inspiratory effort noted.   Receiving diuretics per CT surgery.  Is on prophylactic amiodarone.  QTc stable. Telemetry sinus rhythm rate 70s. Encouraged ambulation, deep breathing, incentive spirometry use.      Continue post op supportive care.     )7/13/2019  MYLES Hair/Transcription:   \"Dictated utilizing Dragon dictation\".   "

## 2019-07-13 NOTE — PLAN OF CARE
"Problem: Patient Care Overview  Goal: Plan of Care Review  Outcome: Ongoing (interventions implemented as appropriate)   07/13/19 1727   OTHER   Outcome Summary VSS. D/C'd chest tubes, central line, and payton catheter. Pt has until 2140 to void. Pt was able to ambulate in the hallway. Pt states that he feels \"more lucid\" this afternoon than he did this AM. Has had mild complaints of pain, see MAR for the last dose of pain medication. Post chest tube removal, the pt has no complaints of pain at this time. Chest xray showed no pneumothorax. Will continue to monitor and assess. 7/13/2019 @ 1730.   Plan of Care Review   Progress improving   Coping/Psychosocial   Plan of Care Reviewed With patient;spouse       Problem: Cardiac Surgery (Adult)  Goal: Signs and Symptoms of Listed Potential Problems Will be Absent, Minimized or Managed (Cardiac Surgery)  Outcome: Ongoing (interventions implemented as appropriate)   07/13/19 1727   Goal/Outcome Evaluation   Problems Assessed (Cardiac Surgery) all   Problems Present (Cardiac Surgery) pain;situational response     Goal: Anesthesia/Sedation Recovery  Outcome: Ongoing (interventions implemented as appropriate)   07/13/19 1727   Goal/Outcome Evaluation   Anesthesia/Sedation Recovery criteria met for discharge       Problem: Skin Injury Risk (Adult)  Goal: Identify Related Risk Factors and Signs and Symptoms  Outcome: Ongoing (interventions implemented as appropriate)   07/13/19 1727   Skin Injury Risk (Adult)   Related Risk Factors (Skin Injury Risk) critical care admission;medical devices     Goal: Skin Health and Integrity  Outcome: Ongoing (interventions implemented as appropriate)   07/13/19 1727   Skin Injury Risk (Adult)   Skin Health and Integrity making progress toward outcome       Problem: Fall Risk (Adult)  Goal: Identify Related Risk Factors and Signs and Symptoms  Outcome: Ongoing (interventions implemented as appropriate)   07/13/19 1727   Fall Risk (Adult) "   Related Risk Factors (Fall Risk) fear of falling;environment unfamiliar   Signs and Symptoms (Fall Risk) presence of risk factors     Goal: Absence of Fall  Outcome: Ongoing (interventions implemented as appropriate)   07/13/19 0203   Fall Risk (Adult)   Absence of Fall making progress toward outcome

## 2019-07-13 NOTE — PROGRESS NOTES
-severe CAD- s/p CABGx4 LIMA, EVH of right SVG- POD#2 Tsirigotis  -Hypertension  -Hyperlipidemia  -Post op anemia, expected acute blood loss     IV diurese.  Mobilize.  Encourage pulmonary toilet.  Discontinue chest tubes, central line, payton catheter.  Isolate wires.    Nicky Santiago, APRN  07/13/19  8:47 AM    Above findings confirmed, agree with assessment.

## 2019-07-13 NOTE — PLAN OF CARE
Problem: Patient Care Overview  Goal: Plan of Care Review  Outcome: Ongoing (interventions implemented as appropriate)   07/13/19 1047   OTHER   Outcome Summary patient very guarded during mobility, cues for deep breathing and to take longer steps. Patient able to tolerate increased ambulation distance    Coping/Psychosocial   Plan of Care Reviewed With patient

## 2019-07-13 NOTE — PLAN OF CARE
Problem: Patient Care Overview  Goal: Plan of Care Review  Outcome: Ongoing (interventions implemented as appropriate)   07/13/19 5646   OTHER   Outcome Summary POD 2 CABGx4. Muller with adequat output, CT in place with minimal output. Pain treated with prn medications. Wires to pacer. Actively pacing. SBP 120s-130s. No falls Will continue to monitor    Plan of Care Review   Progress improving   Coping/Psychosocial   Plan of Care Reviewed With patient

## 2019-07-14 ENCOUNTER — APPOINTMENT (OUTPATIENT)
Dept: GENERAL RADIOLOGY | Facility: HOSPITAL | Age: 64
End: 2019-07-14

## 2019-07-14 LAB
ANION GAP SERPL CALCULATED.3IONS-SCNC: 11.8 MMOL/L (ref 5–15)
BUN BLD-MCNC: 16 MG/DL (ref 8–23)
BUN/CREAT SERPL: 18.6 (ref 7–25)
CALCIUM SPEC-SCNC: 9 MG/DL (ref 8.6–10.5)
CHLORIDE SERPL-SCNC: 100 MMOL/L (ref 98–107)
CO2 SERPL-SCNC: 24.2 MMOL/L (ref 22–29)
CREAT BLD-MCNC: 0.86 MG/DL (ref 0.76–1.27)
DEPRECATED RDW RBC AUTO: 48 FL (ref 37–54)
ERYTHROCYTE [DISTWIDTH] IN BLOOD BY AUTOMATED COUNT: 13 % (ref 12.3–15.4)
GFR SERPL CREATININE-BSD FRML MDRD: 90 ML/MIN/1.73
GLUCOSE BLD-MCNC: 114 MG/DL (ref 65–99)
GLUCOSE BLDC GLUCOMTR-MCNC: 108 MG/DL (ref 70–130)
GLUCOSE BLDC GLUCOMTR-MCNC: 108 MG/DL (ref 70–130)
GLUCOSE BLDC GLUCOMTR-MCNC: 125 MG/DL (ref 70–130)
GLUCOSE BLDC GLUCOMTR-MCNC: 91 MG/DL (ref 70–130)
HCT VFR BLD AUTO: 31.3 % (ref 37.5–51)
HGB BLD-MCNC: 9.8 G/DL (ref 13–17.7)
MCH RBC QN AUTO: 31.2 PG (ref 26.6–33)
MCHC RBC AUTO-ENTMCNC: 31.3 G/DL (ref 31.5–35.7)
MCV RBC AUTO: 99.7 FL (ref 79–97)
PLATELET # BLD AUTO: 109 10*3/MM3 (ref 140–450)
PMV BLD AUTO: 11.4 FL (ref 6–12)
POTASSIUM BLD-SCNC: 4.4 MMOL/L (ref 3.5–5.2)
RBC # BLD AUTO: 3.14 10*6/MM3 (ref 4.14–5.8)
SODIUM BLD-SCNC: 136 MMOL/L (ref 136–145)
WBC NRBC COR # BLD: 10.7 10*3/MM3 (ref 3.4–10.8)

## 2019-07-14 PROCEDURE — 99231 SBSQ HOSP IP/OBS SF/LOW 25: CPT | Performed by: NURSE PRACTITIONER

## 2019-07-14 PROCEDURE — 99024 POSTOP FOLLOW-UP VISIT: CPT | Performed by: THORACIC SURGERY (CARDIOTHORACIC VASCULAR SURGERY)

## 2019-07-14 PROCEDURE — 85027 COMPLETE CBC AUTOMATED: CPT | Performed by: THORACIC SURGERY (CARDIOTHORACIC VASCULAR SURGERY)

## 2019-07-14 PROCEDURE — 82962 GLUCOSE BLOOD TEST: CPT

## 2019-07-14 PROCEDURE — 97110 THERAPEUTIC EXERCISES: CPT

## 2019-07-14 PROCEDURE — 80048 BASIC METABOLIC PNL TOTAL CA: CPT | Performed by: THORACIC SURGERY (CARDIOTHORACIC VASCULAR SURGERY)

## 2019-07-14 PROCEDURE — 71046 X-RAY EXAM CHEST 2 VIEWS: CPT

## 2019-07-14 RX ORDER — FUROSEMIDE 40 MG/1
40 TABLET ORAL DAILY
Status: DISCONTINUED | OUTPATIENT
Start: 2019-07-14 | End: 2019-07-16 | Stop reason: HOSPADM

## 2019-07-14 RX ORDER — ACETAMINOPHEN 325 MG/1
650 TABLET ORAL EVERY 6 HOURS
Status: DISCONTINUED | OUTPATIENT
Start: 2019-07-14 | End: 2019-07-16 | Stop reason: HOSPADM

## 2019-07-14 RX ADMIN — SODIUM CHLORIDE, PRESERVATIVE FREE 30 ML: 5 INJECTION INTRAVENOUS at 09:37

## 2019-07-14 RX ADMIN — ATORVASTATIN CALCIUM 40 MG: 20 TABLET, FILM COATED ORAL at 20:05

## 2019-07-14 RX ADMIN — HYDROCODONE BITARTRATE AND ACETAMINOPHEN 2 TABLET: 5; 325 TABLET ORAL at 03:54

## 2019-07-14 RX ADMIN — MUPIROCIN 1 APPLICATION: 20 OINTMENT TOPICAL at 20:05

## 2019-07-14 RX ADMIN — MUPIROCIN 1 APPLICATION: 20 OINTMENT TOPICAL at 09:37

## 2019-07-14 RX ADMIN — METOPROLOL TARTRATE 25 MG: 25 TABLET ORAL at 20:05

## 2019-07-14 RX ADMIN — AMIODARONE HYDROCHLORIDE 400 MG: 200 TABLET ORAL at 09:36

## 2019-07-14 RX ADMIN — FUROSEMIDE 40 MG: 40 TABLET ORAL at 11:48

## 2019-07-14 RX ADMIN — METOPROLOL TARTRATE 25 MG: 25 TABLET ORAL at 09:36

## 2019-07-14 RX ADMIN — HYDROCODONE BITARTRATE AND ACETAMINOPHEN 2 TABLET: 5; 325 TABLET ORAL at 17:41

## 2019-07-14 RX ADMIN — AMIODARONE HYDROCHLORIDE 400 MG: 200 TABLET ORAL at 20:05

## 2019-07-14 RX ADMIN — ACETAMINOPHEN 650 MG: 325 TABLET, FILM COATED ORAL at 20:05

## 2019-07-14 RX ADMIN — HYDROCODONE BITARTRATE AND ACETAMINOPHEN 2 TABLET: 5; 325 TABLET ORAL at 09:38

## 2019-07-14 RX ADMIN — PANTOPRAZOLE SODIUM 40 MG: 40 TABLET, DELAYED RELEASE ORAL at 06:20

## 2019-07-14 RX ADMIN — POTASSIUM CHLORIDE 20 MEQ: 750 CAPSULE, EXTENDED RELEASE ORAL at 09:37

## 2019-07-14 RX ADMIN — CYCLOBENZAPRINE 10 MG: 10 TABLET, FILM COATED ORAL at 17:41

## 2019-07-14 RX ADMIN — CHLORHEXIDINE GLUCONATE 15 ML: 1.2 RINSE ORAL at 06:20

## 2019-07-14 RX ADMIN — ASPIRIN 81 MG: 81 TABLET, COATED ORAL at 09:36

## 2019-07-14 RX ADMIN — CYCLOBENZAPRINE 10 MG: 10 TABLET, FILM COATED ORAL at 09:38

## 2019-07-14 NOTE — PROGRESS NOTES
[facility]  Cardiology Progress Note    Patient Identification:  Name: Elijah Montero  Age: 63 y.o.  Sex: male  :  1955  MRN: [id number]                 Follow Up / Chief Complaint: CAD s/p CABG    Interval History: Patient underwent CABG x4  with right EVH on 2019.  Tolerated well.  Vital signs stable.          Subjective: feels better today.  Pain is better controlled.  Does have issues with tremors/shakes while walking with PT.  Feels anxious when he is up walking but does better walking after he has had a pain pill.     Pulling 500 - 750 on IS.    Family at bedside.  + BM.  Is still on clears diet but feels ready to advance.       Past Medical History:  Past Medical History:   Diagnosis Date   • Chest pain     possibly due to pericarditis   • Coronary artery disease     mild to moderate   • Heart disease    • History of positive PPD     as a child    • History of rotator cuff tear     LEFT    • Hyperlipidemia    • Hypertension      Past Surgical History:  Past Surgical History:   Procedure Laterality Date   • CARDIAC CATHETERIZATION      angioplasty with stent    • CARDIAC CATHETERIZATION  2015   • CARDIAC CATHETERIZATION N/A 7/3/2019    Procedure: Left Heart Cath;  Surgeon: Guera Perry MD;  [facility];  Service: Cardiology   • CARDIAC CATHETERIZATION N/A 7/3/2019    Procedure: Coronary angiography;  Surgeon: Guera Perry MD;  Location:  [facility] CATH INVASIVE LOCATION;  Service: Cardiology   • CARDIAC CATHETERIZATION N/A 7/3/2019    Procedure: Left ventriculography;  Surgeon: Guera Perry MD;  Location:  [facility] CATH INVASIVE LOCATION;  Service: Cardiology   • CORONARY ARTERY BYPASS GRAFT N/A 2019    Procedure: TYLOR STERNOTOMY CORONARY ARTERY BYPASS GRAFT TIMES 4 USING LEFT INTERNAL MAMMARY ARTERY AND RIGHT GREATER SAPHENOUS VEIN GRAFT PER ENDOSCOPIC VEIN HARVESTING AND PRP;  Surgeon: Gael Mccall  MD ISAÍAS;  Location: [facility] OR;  Service: Cardiothoracic   • CORONARY STENT PLACEMENT     • SKIN CANCER EXCISION N/A 2013   • TONSILLECTOMY          Social History:   Social History     Tobacco Use   • Smoking status: Former Smoker     Packs/day: 1.00     Years: 13.00     Pack years: 13.00     Types: Cigarettes   • Smokeless tobacco: Never Used   • Tobacco comment: QUIT AGE 29   Substance Use Topics   • Alcohol use: Yes     Comment: THREE TIMES WEEK, BEER      Family History:  Family History   Problem Relation Age of Onset   • Other Mother 65        cabg   • Heart disease Father    • Other Father 45        cabg   • Other Sister         BLOOD DISEASE    • Malig Hyperthermia Neg Hx           Allergies:  No Known Allergies     Scheduled Meds:    amiodarone 400 mg Q12H   aspirin 81 mg Daily   atorvastatin 40 mg Nightly   chlorhexidine 15 mL Q12H   enoxaparin 40 mg Q24H   furosemide 40 mg Daily   insulin lispro 0-7 Units 4x Daily With Meals & Nightly   metoprolol tartrate 25 mg Q12H   mupirocin 1 application BID   pantoprazole 40 mg Q AM   potassium chloride 20 mEq Daily   sennosides-docusate sodium 2 tablet Nightly       I have reviewed the patient's recent medical history and current medications, as well as personally reviewed/interpreted the telemetry/ECG data.      INTAKE AND OUTPUT:    Intake/Output Summary (Last 24 hours) at 7/14/2019 1141  Last data filed at 7/14/2019 0500  Gross per 24 hour   Intake 240 ml   Output 1375 ml   Net -1135 ml       Review of Systems:   GI: Denies abdominal pain and nausea, vomiting, diarrhea  Cardiac: Denies chest pain and palpitations  Pulmonary: Denies shortness of breath and cough    Constitutional:  Temp:  [98.4 °F (36.9 °C)-99 °F (37.2 °C)] 99 °F (37.2 °C)  Heart Rate:  [64-77] 72  Resp:  [16-18] 18  BP: (111-130)/(60-72) 130/67   SpO2:  [92 %-100 %] 92 %    Physical Exam:  General:  Appears in no acute distress; resting in chair, complains of incisional pain but otherwise  doing quite well  Eyes: non icteric, no conjunctival drainage  HEENT:  No JVD. Thyroid not visibly enlarged. No mucosal pallor or cyanosis  Respiratory: Respirations regular and unlabored at rest. BBS with good air entry anteriorly and laterally. No crackles or wheezes auscultated;  Cardiovascular: S1S2 Regular rate and rhythm. No murmur, rub or gallop. No carotid bruits. DP/PT pulses 2+. No pretibial pitting edema  Gastrointestinal: Abdomen soft, round, non tender. Bowel sounds present.  No ascites  Musculoskeletal: GARSIA x4. No abnormal movements  Extremities: No digital clubbing or cyanosis  Skin: Color pink. Skin warm and dry to touch. No rashes.  MS incision well approximated  Neuro: AAO x3 CN II-XII grossly intact  Psych: Mood and affect normal, pleasant and cooperative      Cardiographics  Telemetry:     SR            EC/13/19    Apaced.                SR rate 70s similar to prior tracing.         7/10 SR rate 60s        Echocardiogram:   19  Interpretation Summary     · Left atrial cavity size is borderline dilated.  · There is calcification of the aortic valve.  · Calculated EF = 60%.  · There is no evidence of pericardial effusion.            Stress test  19  Interpretation Summary        · Findings consistent with an abnormal ECG stress test.  · Left ventricular ejection fraction is normal (Calculated EF = 60%).  · Myocardial perfusion imaging indicates a small-sized, mildly severe area of ischemia located in the inferior wall.  · Impressions are consistent with an intermediate risk study.     Asymptomatic for chest pain.  ECG is positive for ischemia:  2 mm Horizontal ST segment Depression Inferior Leads (Only Lead III at baseline was non-specific T wave abnormality)  Borderline positive Lateral leads with beat to beat variability 1.5-1.5 mm Horizontal ST depression  (Baseline V6 was non-specific ST T wave abnormality)  BP response: hypertensive, Baseline 114/60, Peak 164/70, Recovery  164/70- 128/64  Recovery: 1 min 160/70                   2 min 150/70                   3 min 140/60                   4 min 144/60                   5 min 134/60                   6 min 128/64  Ectopy: Rare PVC with exercise and recovery, and rare PAC with exercise.  Exercise Tolerance: good and  reached 18 Of  20 Urmila Scale, Stage IV.  Unable to reach THR due to Beta-blocker therapy and fatigue. Changed to Walking Lexiscan.            Cardiac catheterization  7/3/19  Coronary Angiography      Left Main:  The left main originates in the left coronary cusp.  It bifurcates and gives rise to the LAD and circumflex system.  Distal left main 50% stenosis     Left Anterior Descending:  Left anterior descending very proximal 70 to 80% stenosis midportion 50% stenosis, early atherosclerotic plaque of diagonal and  branches     Left Circumflex:  Circumflex artery codominant ostial 70% stenosis     Right Coronary Artery:  The right coronary artery originates in the right coronary cusp.  Right coronary artery proximal 60% stenosis, distally at the bifurcation 90% stenosis extending into the PDA and posterolateral branches     Left Ventriculography:       Estimated Ejection Fraction:         60 %   Wall motion abnormalities:  None   Mitral Regurgitation:  None      Impression:      1. Distal left main 50% stenosis  2. Left anterior descending very proximal 70 to 80% stenosis midportion 50% stenosis, early atherosclerotic plaque of diagonal and  branches  3. Circumflex artery codominant ostial 70% stenosis  4. Right coronary artery proximal 60% stenosis, distally at the bifurcation 90% stenosis extending into the PDA and posterolateral branches  5. Normal LV gram     Recommendations:      1. Will consider coronary artery bypass graft surgery       Lab Review       Results from last 7 days   Lab Units 07/12/19  0303   MAGNESIUM mg/dL 2.3     Results from last 7 days   Lab Units 07/14/19  0347   SODIUM mmol/L  "136   POTASSIUM mmol/L 4.4   BUN mg/dL 16   CREATININE mg/dL 0.86   CALCIUM mg/dL 9.0       Results from last 7 days   Lab Units 07/14/19  0800 07/13/19  0343 07/12/19  0303   WBC 10*3/mm3 10.70 10.78 8.76   HEMOGLOBIN g/dL 9.8* 9.9* 10.6*   HEMATOCRIT % 31.3* 31.6* 32.8*   PLATELETS 10*3/mm3 109* 86* 91*     Results from last 7 days   Lab Units 07/12/19  0303 07/11/19  1254 07/10/19  0758   INR  1.29* 1.42* 0.94   APTT seconds  --  29.8 28.8     Estimated Creatinine Clearance: 102.2 mL/min (by C-G formula based on SCr of 0.86 mg/dL).        Assessment/plan    1.  CAD s/p CAB x 4 on 7/11.  Is on asa, statin, and bb.    2.  Hypertension: adequate control.  Somewhat labile.  Continue BB.   3.  Hyperlipidemia: is on statin  4.  Post op ABL anemia: 7/14 Hgb stable 9.8 (9.9).         .  Encourage I-S use q 1 to 2 hours.  Better effort today.  Now on po lasix per CTS.   Is on prophylactic amiodarone.  QTc stable. Telemetry sinus rhythm rate 70s. Encouraged ambulation, deep breathing, incentive spirometry use.      Continue post op supportive care.     Dr. Perry to resume care in am.     )7/14/2019  Danika Storm, MYLES Valdez/Transcription:   \"Dictated utilizing Dragon dictation\".   "

## 2019-07-14 NOTE — PROGRESS NOTES
-severe CAD- s/p CABGx4 LIMA, EVH of right SVG- POD#3 Tsirigotis  -Hypertension  -Hyperlipidemia  -Post op anemia, expected acute blood loss     PO diurese.  Mobilize.  Encourage pulmonary toilet.  Discontinue wires.  Making good progress.     Nicky Santiago, APRN  07/14/19  9:10  AM    Making good progress.

## 2019-07-14 NOTE — PLAN OF CARE
Problem: Patient Care Overview  Goal: Plan of Care Review  Outcome: Ongoing (interventions implemented as appropriate)   07/14/19 7261   OTHER   Outcome Summary Patient participated well with skilled PT. Increased ambulation distance this visit. Gets really cold when walking, but will warm up when he gets back the the chair. Will continue to progress towards goals.   Plan of Care Review   Progress improving   Coping/Psychosocial   Plan of Care Reviewed With patient

## 2019-07-14 NOTE — PLAN OF CARE
Problem: Patient Care Overview  Goal: Plan of Care Review  Outcome: Outcome(s) achieved Date Met: 07/14/19 07/14/19 0451   Plan of Care Review   Progress improving   Coping/Psychosocial   Plan of Care Reviewed With patient;spouse     Goal: Individualization and Mutuality  Outcome: Ongoing (interventions implemented as appropriate)    Goal: Discharge Needs Assessment  Outcome: Ongoing (interventions implemented as appropriate)    Goal: Interprofessional Rounds/Family Conf  Outcome: Ongoing (interventions implemented as appropriate)      Problem: Cardiac Surgery (Adult)  Goal: Signs and Symptoms of Listed Potential Problems Will be Absent, Minimized or Managed (Cardiac Surgery)  Outcome: Ongoing (interventions implemented as appropriate)      Problem: Skin Injury Risk (Adult)  Goal: Identify Related Risk Factors and Signs and Symptoms  Outcome: Ongoing (interventions implemented as appropriate)    Goal: Skin Health and Integrity  Outcome: Ongoing (interventions implemented as appropriate)      Problem: Fall Risk (Adult)  Goal: Identify Related Risk Factors and Signs and Symptoms  Outcome: Ongoing (interventions implemented as appropriate)    Goal: Absence of Fall  Outcome: Ongoing (interventions implemented as appropriate)

## 2019-07-14 NOTE — PLAN OF CARE
Problem: Patient Care Overview  Goal: Plan of Care Review  Outcome: Ongoing (interventions implemented as appropriate)   07/14/19 1729   OTHER   Outcome Summary VSS. Pt complaints of pain in chest and jaw. See MAR. Cardiac wires d/c'd. Will continue to monitor and assess. 7/14/2019 @ 1731.   Plan of Care Review   Progress improving   Coping/Psychosocial   Plan of Care Reviewed With patient;family       Problem: Cardiac Surgery (Adult)  Goal: Signs and Symptoms of Listed Potential Problems Will be Absent, Minimized or Managed (Cardiac Surgery)  Outcome: Ongoing (interventions implemented as appropriate)   07/13/19 1727   Goal/Outcome Evaluation   Problems Assessed (Cardiac Surgery) all   Problems Present (Cardiac Surgery) pain;situational response     Goal: Anesthesia/Sedation Recovery  Outcome: Ongoing (interventions implemented as appropriate)   07/14/19 1729   Goal/Outcome Evaluation   Anesthesia/Sedation Recovery criteria met for discharge       Problem: Skin Injury Risk (Adult)  Goal: Identify Related Risk Factors and Signs and Symptoms  Outcome: Ongoing (interventions implemented as appropriate)   07/13/19 1727   Skin Injury Risk (Adult)   Related Risk Factors (Skin Injury Risk) critical care admission;medical devices     Goal: Skin Health and Integrity  Outcome: Ongoing (interventions implemented as appropriate)   07/14/19 1729   Skin Injury Risk (Adult)   Skin Health and Integrity making progress toward outcome       Problem: Fall Risk (Adult)  Goal: Identify Related Risk Factors and Signs and Symptoms  Outcome: Ongoing (interventions implemented as appropriate)   07/13/19 1727   Fall Risk (Adult)   Related Risk Factors (Fall Risk) fear of falling;environment unfamiliar   Signs and Symptoms (Fall Risk) presence of risk factors     Goal: Absence of Fall  Outcome: Ongoing (interventions implemented as appropriate)   07/14/19 1729   Fall Risk (Adult)   Absence of Fall making progress toward outcome

## 2019-07-14 NOTE — THERAPY TREATMENT NOTE
Acute Care - Physical Therapy Treatment Note  [facility]     Patient Name: Elijah Montero  : 1955  MRN: [id number]  Today's Date: 2019  Onset of Illness/Injury or Date of Surgery: 19          Admit Date: 2019    Visit Dx:    ICD-10-CM ICD-9-CM   1. Impaired mobility Z74.09 799.89   2. Coronary artery disease involving native coronary artery, angina presence unspecified, unspecified whether native or transplanted heart I25.10 414.01     Patient Active Problem List   Diagnosis   • Heart disease   • Hyperlipidemia   • Essential hypertension   • History of positive PPD   • Prediabetes   • Obesity (BMI 30-39.9)   • Coronary artery disease of native artery of native heart with stable angina pectoris (CMS/HCC)   • Precordial pain   • Abnormal cardiac function test   • Coronary artery disease involving native coronary artery       Therapy Treatment    Rehabilitation Treatment Summary     Row Name 19             Treatment Time/Intention    Discipline  physical therapist  -AL      Document Type  therapy note (daily note)  -AL      Subjective Information  complains of;weakness;pain  -AL      Mode of Treatment  physical therapy  -AL      Patient/Family Observations  Patient sitting in chair with wife present.  -AL      Therapy Frequency (PT Clinical Impression)  daily  -AL      Patient Effort  good  -AL      Existing Precautions/Restrictions  cardiac;fall;sternal  -AL      Recorded by [AL] Tisha Han, PT 19      Row Name 19             Cognitive Assessment/Intervention- PT/OT    Orientation Status (Cognition)  oriented x 4  -AL      Follows Commands (Cognition)  WFL  -AL      Recorded by [AL] Tisha Han, PT 19      Row Name 19             Cognitive Assessment Intervention- SLP    Cognitive Function (Cognition)  WFL  -AL      Recorded by [AL] Tisha Han, PT 19      Row Name 19             Bed Mobility  Assessment/Treatment    Bed Mobility Assessment/Treatment  supine-sit;sit-supine  -AL      Supine-Sit [place] (Bed Mobility)  not tested up in chair.  -AL      Sit-Supine [place] (Bed Mobility)  not tested up in chair.  -AL      Recorded by [AL] Tisha Han, PT 07/14/19 0954      Row Name 07/14/19 0921             Transfer Assessment/Treatment    Transfer Assessment/Treatment  sit-stand transfer;stand-sit transfer  -AL      Maintains Weight-bearing Status (Transfers)  able to maintain  -AL      Recorded by [AL] Tisha Han, PT 07/14/19 0954      Row Name 07/14/19 0921             Sit-Stand Transfer    Sit-Stand [place] (Transfers)  supervision;verbal cues  -AL      Assistive Device (Sit-Stand Transfers)  -- none  -AL      Recorded by [AL] Tisha Han, PT 07/14/19 0954      Row Name 07/14/19 0921             Stand-Sit Transfer    Stand-Sit [place] (Transfers)  supervision;verbal cues  -AL      Assistive Device (Stand-Sit Transfers)  -- none  -AL      Recorded by [AL] Tisha Han, PT 07/14/19 0954      Row Name 07/14/19 0921             Gait/Stairs Assessment/Training    Gait/Stairs Assessment/Training  gait/ambulation independence  -AL      [place] Level (Gait)  stand by assist  -AL      Assistive Device (Gait)  -- none  -AL      Distance in Feet (Gait)  150  -AL      Deviations/Abnormal Patterns (Gait)  martha decreased;gait speed decreased;stride length decreased  -AL      Bilateral Gait Deviations  forward flexed posture  -AL      Comment (Gait/Stairs)  Gets cold when walking, but warms up when back in chair.  -AL      Recorded by [AL] Tisha Han, PT 07/14/19 0954      Row Name 07/14/19 0921             Positioning and Restraints    Pre-Treatment Position  sitting in chair/recliner  -AL      Post Treatment Position  chair  -AL      In Chair  sitting;call light within reach;with family/caregiver  -AL      Recorded by [AL] Tisha Han, PT 07/14/19 0954      Row Name 07/14/19 0921              Pain Assessment    Additional Documentation  Pain Scale: Numbers Pre/Post-Treatment (Group)  -AL      Recorded by [AL] Tisha Han, PT 07/14/19 0954      Row Name 07/14/19 0921             Pain Scale: Numbers Pre/Post-Treatment    Pain Scale: Numbers, Pretreatment  5/10  -AL      Pain Scale: Numbers, Post-Treatment  5/10  -AL      Pain Location  back neck, chest  -AL      Recorded by [AL] Tisha Han, PT 07/14/19 0954      Row Name                Wound 07/11/19 1151 chest incision    Wound - Properties Group Date first assessed: 07/11/19 [SR] Time first assessed: 1151 [SR] Location: chest [SR] Type: incision [SR] Recorded by:  [SR] Ruby Noe RN 07/11/19 1151    Row Name                Wound 07/11/19 1151 Right leg incision    Wound - Properties Group Date first assessed: 07/11/19 [SR] Time first assessed: 1151 [SR] Side: Right [SR] Location: leg [SR] Type: incision [SR] Recorded by:  [SR] Ruby Noe RN 07/11/19 1151    Row Name 07/14/19 0921             Coping    Observed Emotional State  accepting;calm;cooperative  -AL      Verbalized Emotional State  acceptance  -AL      Recorded by [AL] Tisha Han, PT 07/14/19 0954      Row Name 07/14/19 0921             Plan of Care Review    Plan of Care Reviewed With  patient  -AL      Recorded by [AL] Tisha Han, PT 07/14/19 0954      Row Name 07/14/19 0921             Outcome Summary/Treatment Plan (PT)    Daily Summary of Progress (PT)  progress toward functional goals is good  -AL      Plan for Continued Treatment (PT)  Focus on progression of gait.  -AL      Anticipated Discharge Disposition (PT)  home with assist;home with home health  -AL      Recorded by [AL] Tisha Han, PT 07/14/19 0954        User Key  (r) = Recorded By, (t) = Taken By, (c) = Cosigned By    Initials Name Effective Dates Discipline    AL Tisha Han, PT 04/03/18 -  PT    SR Ruby Noe RN 06/16/16 -  Nurse          Wound 07/11/19 1151 chest incision (Active)   Dressing  Appearance dry;intact;no drainage 7/14/2019  3:54 AM   Closure JACLYN 7/14/2019  3:54 AM   Base dressing in place, unable to visualize 7/14/2019  3:54 AM   Drainage Amount none 7/14/2019  3:54 AM   Dressing Care, Wound foam;low-adherent 7/13/2019  4:00 PM       Wound 07/11/19 1151 Right leg incision (Active)   Dressing Appearance dry;intact 7/14/2019  3:54 AM   Closure Open to air 7/14/2019  3:54 AM   Base clean;dry 7/14/2019  3:54 AM   Drainage Amount none 7/14/2019  3:54 AM   Dressing Care, Wound open to air 7/13/2019  8:04 PM           Physical Therapy Education     Title: PT OT SLP Therapies (In Progress)     Topic: Physical Therapy (In Progress)     Point: Mobility training (Done)     Learning Progress Summary           Patient Acceptance, E, VU by AL at 7/14/2019  9:54 AM    Acceptance, E, NR by EM at 7/13/2019 10:44 AM    Acceptance, E, NR by EM at 7/12/2019  9:41 AM                   Point: Home exercise program (In Progress)     Learning Progress Summary           Patient Acceptance, E, NR by EM at 7/13/2019 10:44 AM    Acceptance, E, NR by EM at 7/12/2019  9:41 AM                   Point: Body mechanics (Done)     Learning Progress Summary           Patient Acceptance, E, VU by AL at 7/14/2019  9:54 AM                   Point: Precautions (Done)     Learning Progress Summary           Patient Acceptance, E, VU by AL at 7/14/2019  9:54 AM                               User Key     Initials Effective Dates Name Provider Type Discipline    AL 04/03/18 -  Tisha Han, PT Physical Therapist PT    EM 04/03/18 -  Tati Edouard, PT Physical Therapist PT                PT Recommendation and Plan  Anticipated Discharge Disposition (PT): home with assist, home with home health  Therapy Frequency (PT Clinical Impression): daily  Outcome Summary/Treatment Plan (PT)  Daily Summary of Progress (PT): progress toward functional goals is good  Plan for Continued Treatment (PT): Focus on progression of  gait.  Anticipated Discharge Disposition (PT): home with assist, home with home health  Plan of Care Reviewed With: patient  Progress: improving  Outcome Summary: Patient participated well with skilled PT. Increased ambulation distance this visit. Will continue to progress towards goals.  Outcome Measures     Row Name 07/14/19 0900 07/13/19 1000 07/12/19 0900       How much help from another person do you currently need...    Turning from your back to your side while in flat bed without using bedrails?  3  -AL  3  -EM  3  -EM    Moving from lying on back to sitting on the side of a flat bed without bedrails?  3  -AL  2  -EM  2  -EM    Moving to and from a bed to a chair (including a wheelchair)?  3  -AL  3  -EM  3  -EM    Standing up from a chair using your arms (e.g., wheelchair, bedside chair)?  3  -AL  3  -EM  3  -EM    Climbing 3-5 steps with a railing?  2  -AL  2  -EM  2  -EM    To walk in hospital room?  3  -AL  3  -EM  3  -EM    AM-PAC 6 Clicks Score (PT)  17  -AL  16  -EM  16  -EM       Functional Assessment    Outcome Measure Options  AM-PAC 6 Clicks Basic Mobility (PT)  -AL  --  AM-PAC 6 Clicks Basic Mobility (PT)  -EM      User Key  (r) = Recorded By, (t) = Taken By, (c) = Cosigned By    Initials Name Provider Type    Tisha Del Angel, PT Physical Therapist    EM Tati Edouard, PT Physical Therapist         Time Calculation:   PT Charges     Row Name 07/14/19 0956             Time Calculation    Start Time  0921  -AL      Stop Time  0930  -AL      Time Calculation (min)  9 min  -AL      PT Received On  07/14/19  -AL      PT - Next Appointment  07/15/19  -AL        User Key  (r) = Recorded By, (t) = Taken By, (c) = Cosigned By    Initials Name Provider Type    Tisha Del Angel, PT Physical Therapist        Therapy Charges for Today     Code Description Service Date Service Provider Modifiers Qty    80577898712 HC PT THER PROC EA 15 MIN 7/14/2019 Tisha Han, PT GP 1          PT G-Codes  Outcome  Measure Options: -[facility] 6 Clicks Basic Mobility (PT)  -[facility] 6 Clicks Score (PT): 17    Tisha Han, PT  7/14/2019

## 2019-07-15 LAB
ANION GAP SERPL CALCULATED.3IONS-SCNC: 10.9 MMOL/L (ref 5–15)
BUN BLD-MCNC: 19 MG/DL (ref 8–23)
BUN/CREAT SERPL: 20.2 (ref 7–25)
CALCIUM SPEC-SCNC: 9 MG/DL (ref 8.6–10.5)
CHLORIDE SERPL-SCNC: 98 MMOL/L (ref 98–107)
CO2 SERPL-SCNC: 26.1 MMOL/L (ref 22–29)
CREAT BLD-MCNC: 0.94 MG/DL (ref 0.76–1.27)
DEPRECATED RDW RBC AUTO: 46.4 FL (ref 37–54)
ERYTHROCYTE [DISTWIDTH] IN BLOOD BY AUTOMATED COUNT: 13 % (ref 12.3–15.4)
GFR SERPL CREATININE-BSD FRML MDRD: 82 ML/MIN/1.73
GLUCOSE BLD-MCNC: 119 MG/DL (ref 65–99)
GLUCOSE BLDC GLUCOMTR-MCNC: 118 MG/DL (ref 70–130)
GLUCOSE BLDC GLUCOMTR-MCNC: 123 MG/DL (ref 70–130)
GLUCOSE BLDC GLUCOMTR-MCNC: 127 MG/DL (ref 70–130)
HCT VFR BLD AUTO: 29.8 % (ref 37.5–51)
HGB BLD-MCNC: 9.6 G/DL (ref 13–17.7)
MCH RBC QN AUTO: 31.8 PG (ref 26.6–33)
MCHC RBC AUTO-ENTMCNC: 32.2 G/DL (ref 31.5–35.7)
MCV RBC AUTO: 98.7 FL (ref 79–97)
PLATELET # BLD AUTO: 127 10*3/MM3 (ref 140–450)
PMV BLD AUTO: 10.8 FL (ref 6–12)
POTASSIUM BLD-SCNC: 3.8 MMOL/L (ref 3.5–5.2)
RBC # BLD AUTO: 3.02 10*6/MM3 (ref 4.14–5.8)
SODIUM BLD-SCNC: 135 MMOL/L (ref 136–145)
WBC NRBC COR # BLD: 9.42 10*3/MM3 (ref 3.4–10.8)

## 2019-07-15 PROCEDURE — 25010000002 ONDANSETRON PER 1 MG: Performed by: THORACIC SURGERY (CARDIOTHORACIC VASCULAR SURGERY)

## 2019-07-15 PROCEDURE — 99024 POSTOP FOLLOW-UP VISIT: CPT | Performed by: NURSE PRACTITIONER

## 2019-07-15 PROCEDURE — 99232 SBSQ HOSP IP/OBS MODERATE 35: CPT | Performed by: INTERNAL MEDICINE

## 2019-07-15 PROCEDURE — 82962 GLUCOSE BLOOD TEST: CPT

## 2019-07-15 PROCEDURE — 80048 BASIC METABOLIC PNL TOTAL CA: CPT | Performed by: THORACIC SURGERY (CARDIOTHORACIC VASCULAR SURGERY)

## 2019-07-15 PROCEDURE — 85027 COMPLETE CBC AUTOMATED: CPT | Performed by: THORACIC SURGERY (CARDIOTHORACIC VASCULAR SURGERY)

## 2019-07-15 PROCEDURE — 97110 THERAPEUTIC EXERCISES: CPT

## 2019-07-15 RX ADMIN — POTASSIUM CHLORIDE 20 MEQ: 750 CAPSULE, EXTENDED RELEASE ORAL at 09:06

## 2019-07-15 RX ADMIN — METOPROLOL TARTRATE 25 MG: 25 TABLET ORAL at 09:06

## 2019-07-15 RX ADMIN — HYDROCODONE BITARTRATE AND ACETAMINOPHEN 1 TABLET: 5; 325 TABLET ORAL at 09:13

## 2019-07-15 RX ADMIN — ATORVASTATIN CALCIUM 40 MG: 20 TABLET, FILM COATED ORAL at 20:17

## 2019-07-15 RX ADMIN — MUPIROCIN 1 APPLICATION: 20 OINTMENT TOPICAL at 20:16

## 2019-07-15 RX ADMIN — AMIODARONE HYDROCHLORIDE 400 MG: 200 TABLET ORAL at 20:16

## 2019-07-15 RX ADMIN — ONDANSETRON 4 MG: 2 INJECTION INTRAMUSCULAR; INTRAVENOUS at 20:21

## 2019-07-15 RX ADMIN — HYDROCODONE BITARTRATE AND ACETAMINOPHEN 2 TABLET: 5; 325 TABLET ORAL at 00:38

## 2019-07-15 RX ADMIN — ASPIRIN 81 MG: 81 TABLET, COATED ORAL at 09:06

## 2019-07-15 RX ADMIN — ACETAMINOPHEN 650 MG: 325 TABLET, FILM COATED ORAL at 20:16

## 2019-07-15 RX ADMIN — ACETAMINOPHEN 650 MG: 325 TABLET, FILM COATED ORAL at 03:46

## 2019-07-15 RX ADMIN — FUROSEMIDE 40 MG: 40 TABLET ORAL at 09:05

## 2019-07-15 RX ADMIN — PANTOPRAZOLE SODIUM 40 MG: 40 TABLET, DELAYED RELEASE ORAL at 06:28

## 2019-07-15 RX ADMIN — AMIODARONE HYDROCHLORIDE 400 MG: 200 TABLET ORAL at 09:05

## 2019-07-15 RX ADMIN — METOPROLOL TARTRATE 25 MG: 25 TABLET ORAL at 20:16

## 2019-07-15 RX ADMIN — MUPIROCIN 1 APPLICATION: 20 OINTMENT TOPICAL at 09:05

## 2019-07-15 RX ADMIN — HYDROCODONE BITARTRATE AND ACETAMINOPHEN 1 TABLET: 5; 325 TABLET ORAL at 14:07

## 2019-07-15 NOTE — PROGRESS NOTES
" LOS: 4 days   Patient Care Team:  Nahomi Rae APRN as PCP - General (Family Medicine)  Guera Perry MD as Consulting Physician (Cardiology)    Chief Complaint:  Post op fu    Subjective:  Symptoms:  He reports shortness of breath (with activity).    Diet:  Adequate intake.  No nausea or vomiting.    Activity level: Returning to normal.    Pain:  Pain is requiring pain medication and well controlled.          Vital Signs  Temp:  [98.1 °F (36.7 °C)-98.4 °F (36.9 °C)] 98.3 °F (36.8 °C)  Heart Rate:  [62-76] 76  Resp:  [18] 18  BP: (105-121)/(63-69) 121/67  Body mass index is 30.2 kg/m².    Intake/Output Summary (Last 24 hours) at 7/15/2019 1224  Last data filed at 7/15/2019 0800  Gross per 24 hour   Intake 240 ml   Output 200 ml   Net 40 ml     I/O this shift:  In: 240 [P.O.:240]  Out: -           07/13/19  0651 07/14/19  0600 07/15/19  0500   Weight: 94.7 kg (208 lb 11.2 oz) 94.2 kg (207 lb 9.6 oz) 92.8 kg (204 lb 9.6 oz)         Objective:  General Appearance:  Comfortable.    Vital signs: (most recent): Blood pressure 119/66, pulse 70, temperature 98.7 °F (37.1 °C), temperature source Oral, resp. rate 18, height 175.3 cm (69.02\"), weight 92.8 kg (204 lb 9.6 oz), SpO2 93 %.    Output: Producing urine and producing stool (loose).    Lungs:  Normal effort and normal respiratory rate.  There are rales (right lower lobe) and decreased breath sounds (left lower lobe).    Heart: Normal rate.  Regular rhythm.  S1 normal and S2 normal.    Abdomen: Abdomen is soft and non-distended.    Extremities: There is dependent edema (1+ right pretibial).    Pulses: Distal pulses are intact.    Neurological: Patient is alert and oriented to person, place and time.    Skin:  Warm and dry.  (Sternal and leg incisions well approximated without erythema, edema, or drainage)            Results Review:        WBC WBC   Date Value Ref Range Status   07/15/2019 9.42 3.40 - 10.80 10*3/mm3 Final   07/14/2019 10.70 3.40 - 10.80 " 10*3/mm3 Final   07/13/2019 10.78 3.40 - 10.80 10*3/mm3 Final      HGB Hemoglobin   Date Value Ref Range Status   07/15/2019 9.6 (L) 13.0 - 17.7 g/dL Final   07/14/2019 9.8 (L) 13.0 - 17.7 g/dL Final   07/13/2019 9.9 (L) 13.0 - 17.7 g/dL Final      HCT Hematocrit   Date Value Ref Range Status   07/15/2019 29.8 (L) 37.5 - 51.0 % Final   07/14/2019 31.3 (L) 37.5 - 51.0 % Final   07/13/2019 31.6 (L) 37.5 - 51.0 % Final      Platelets Platelets   Date Value Ref Range Status   07/15/2019 127 (L) 140 - 450 10*3/mm3 Final   07/14/2019 109 (L) 140 - 450 10*3/mm3 Final   07/13/2019 86 (L) 140 - 450 10*3/mm3 Final        PT/INR:  No results found for: PROTIME/No results found for: INR    Sodium Sodium   Date Value Ref Range Status   07/15/2019 135 (L) 136 - 145 mmol/L Final   07/14/2019 136 136 - 145 mmol/L Final   07/13/2019 139 136 - 145 mmol/L Final      Potassium Potassium   Date Value Ref Range Status   07/15/2019 3.8 3.5 - 5.2 mmol/L Final   07/14/2019 4.4 3.5 - 5.2 mmol/L Final   07/13/2019 4.3 3.5 - 5.2 mmol/L Final      Chloride Chloride   Date Value Ref Range Status   07/15/2019 98 98 - 107 mmol/L Final   07/14/2019 100 98 - 107 mmol/L Final   07/13/2019 104 98 - 107 mmol/L Final      Bicarbonate CO2   Date Value Ref Range Status   07/15/2019 26.1 22.0 - 29.0 mmol/L Final   07/14/2019 24.2 22.0 - 29.0 mmol/L Final   07/13/2019 25.5 22.0 - 29.0 mmol/L Final      BUN BUN   Date Value Ref Range Status   07/15/2019 19 8 - 23 mg/dL Final   07/14/2019 16 8 - 23 mg/dL Final   07/13/2019 14 8 - 23 mg/dL Final      Creatinine Creatinine   Date Value Ref Range Status   07/15/2019 0.94 0.76 - 1.27 mg/dL Final   07/14/2019 0.86 0.76 - 1.27 mg/dL Final   07/13/2019 0.88 0.76 - 1.27 mg/dL Final      Calcium Calcium   Date Value Ref Range Status   07/15/2019 9.0 8.6 - 10.5 mg/dL Final   07/14/2019 9.0 8.6 - 10.5 mg/dL Final   07/13/2019 9.0 8.6 - 10.5 mg/dL Final      Magnesium No results found for: MG         acetaminophen 650 mg  "Oral Q6H   amiodarone 400 mg Oral Q12H   aspirin 81 mg Oral Daily   atorvastatin 40 mg Oral Nightly   chlorhexidine 15 mL Mouth/Throat Q12H   enoxaparin 40 mg Subcutaneous Q24H   furosemide 40 mg Oral Daily   insulin lispro 0-7 Units Subcutaneous 4x Daily With Meals & Nightly   metoprolol tartrate 25 mg Oral Q12H   mupirocin 1 application Each Nare BID   pantoprazole 40 mg Oral Q AM   potassium chloride 20 mEq Oral Daily   sennosides-docusate sodium 2 tablet Oral Nightly       nitroglycerin 5-200 mcg/min    sodium chloride 30 mL/hr Last Rate: 30 mL/hr (07/11/19 1310)   sodium chloride 30 mL/hr            Patient Active Problem List   Diagnosis Code   • Heart disease I51.9   • Hyperlipidemia E78.5   • Essential hypertension I10   • History of positive PPD R76.11   • Prediabetes R73.03   • Obesity (BMI 30-39.9) E66.9   • Coronary artery disease of native artery of native heart with stable angina pectoris (CMS/HCC) I25.118   • Precordial pain R07.2   • Abnormal cardiac function test R94.30   • Coronary artery disease involving native coronary artery I25.10       Assessment & Plan    CAD s/p CABG x4 with LIMA, right SVG EVH----POD #4 (Tsirigotis)  HTN---controlled  HL----on statin  Post op anemia----expected blood loss    Navigated stairs successfully with PT today, patient still feels \"shaky\", will plan for discharge tomorrow.  Still with crackles in right lower lobe, increase pulmonary toilet (only pulling 750) and continue Lasix.      Rajwinder Carl, APRN  07/15/19  12:24 PM  "

## 2019-07-15 NOTE — PLAN OF CARE
Problem: Patient Care Overview  Goal: Plan of Care Review  Outcome: Ongoing (interventions implemented as appropriate)   07/15/19 0437   Plan of Care Review   Progress improving   Coping/Psychosocial   Plan of Care Reviewed With patient     Goal: Individualization and Mutuality  Outcome: Ongoing (interventions implemented as appropriate)    Goal: Discharge Needs Assessment  Outcome: Ongoing (interventions implemented as appropriate)    Goal: Interprofessional Rounds/Family Conf  Outcome: Ongoing (interventions implemented as appropriate)      Problem: Cardiac Surgery (Adult)  Goal: Signs and Symptoms of Listed Potential Problems Will be Absent, Minimized or Managed (Cardiac Surgery)  Outcome: Ongoing (interventions implemented as appropriate)      Problem: Skin Injury Risk (Adult)  Goal: Identify Related Risk Factors and Signs and Symptoms  Outcome: Ongoing (interventions implemented as appropriate)    Goal: Skin Health and Integrity  Outcome: Ongoing (interventions implemented as appropriate)      Problem: Fall Risk (Adult)  Goal: Identify Related Risk Factors and Signs and Symptoms  Outcome: Ongoing (interventions implemented as appropriate)    Goal: Absence of Fall  Outcome: Ongoing (interventions implemented as appropriate)

## 2019-07-15 NOTE — PLAN OF CARE
"Problem: Patient Care Overview  Goal: Plan of Care Review  Outcome: Ongoing (interventions implemented as appropriate)   07/15/19 1113   OTHER   Outcome Summary still c/o wkness, but reports less \"shaky' today w/amb; cues to slow pace for amb and stairs; possibly home today/tomorrow   Plan of Care Review   Progress improving   Coping/Psychosocial   Plan of Care Reviewed With patient;spouse         "

## 2019-07-15 NOTE — PLAN OF CARE
Problem: Patient Care Overview  Goal: Plan of Care Review  Outcome: Ongoing (interventions implemented as appropriate)   07/15/19 1839   OTHER   Outcome Summary Pt has complained of some neck aches today. No SOA reported, SR on the monitor. Anticipating discharge tomorrow. VS stable, will continue to monitor.   Plan of Care Review   Progress improving   Coping/Psychosocial   Plan of Care Reviewed With patient       Problem: Cardiac Surgery (Adult)  Intervention: Prevent Venous Stasis/Minimize VTE Risk   07/15/19 1839   Interventions   VTE Prevention/Management bilateral;compression stockings on;dorsiflexion/plantar flexion performed     Intervention: Prevent/Manage Post Cardiothoracic Surgery Infection   07/15/19 1839   Nutrition Interventions   Glycemic Management blood glucose monitoring     Intervention: Manage Post-operative Pain   07/15/19 1839   Promote Oxygenation/Perfusion   Pain Management Interventions see MAR     Intervention: Provide Oxygenation/Ventilation/Perfusion Support   07/15/19 1839   Activity   Activity Management activity encouraged;ambulated in oh;ambulated in room;up in chair         Problem: Fall Risk (Adult)  Intervention: Monitor/Assist with Self Care   07/15/19 1839   Activity   Activity Assistance Provided independent     Intervention: Reduce Risk/Promote Restraint Free Environment   07/15/19 1839   Safety Management   Environmental Safety Modification assistive device/personal items within reach;clutter free environment maintained;lighting adjusted;room near unit station;room organization consistent   Safety Promotion/Fall Prevention activity supervised;nonskid shoes/slippers when out of bed;safety round/check completed     Intervention: Review Medications/Identify Contributors to Fall Risk   07/15/19 1839   Safety Management   Medication Review/Management medications reviewed

## 2019-07-15 NOTE — PROGRESS NOTES
[facility]  Cardiology Progress Note    Patient Identification:  Name: Elijah Montero  Age: 63 y.o.  Sex: male  :  1955  MRN: [id number]                 Follow Up / Chief Complaint: CAD s/p CABG    Interval History: VS stable, may go home tomorrow.          Subjective: Pain is improving.  Denies chest pain and shortness of breath.       Past Medical History:  Past Medical History:   Diagnosis Date   • Chest pain     possibly due to pericarditis   • Coronary artery disease     mild to moderate   • Heart disease    • History of positive PPD     as a child    • History of rotator cuff tear     LEFT    • Hyperlipidemia    • Hypertension      Past Surgical History:  Past Surgical History:   Procedure Laterality Date   • CARDIAC CATHETERIZATION      angioplasty with stent    • CARDIAC CATHETERIZATION  2015   • CARDIAC CATHETERIZATION N/A 7/3/2019    Procedure: Left Heart Cath;  Surgeon: Guera Perry MD;  Location: [facility] CATH INVASIVE LOCATION;  Service: Cardiology   • CARDIAC CATHETERIZATION N/A 7/3/2019    Procedure: Coronary angiography;  Surgeon: Guera Perry MD;  Location: [facility] CATH INVASIVE LOCATION;  Service: Cardiology   • CARDIAC CATHETERIZATION N/A 7/3/2019    Procedure: Left ventriculography;  Surgeon: Guera Perry MD;  Location: [facility] CATH INVASIVE LOCATION;  Service: Cardiology   • CORONARY ARTERY BYPASS GRAFT N/A 2019    Procedure: TYLOR STERNOTOMY CORONARY ARTERY BYPASS GRAFT TIMES 4 USING LEFT INTERNAL MAMMARY ARTERY AND RIGHT GREATER SAPHENOUS VEIN GRAFT PER ENDOSCOPIC VEIN HARVESTING AND PRP;  Surgeon: Gael Mccall MD;  Location: [facility] OR;  Service: Cardiothoracic   • CORONARY STENT PLACEMENT     • SKIN CANCER EXCISION N/A    • TONSILLECTOMY          Social History:   Social History     Tobacco Use   • Smoking status: Former Smoker     Packs/day: 1.00     Years: 13.00     Pack years: 13.00     Types:  "Cigarettes   • Smokeless tobacco: Never Used   • Tobacco comment: QUIT AGE 29   Substance Use Topics   • Alcohol use: Yes     Comment: THREE TIMES WEEK, BEER      Family History:  Family History   Problem Relation Age of Onset   • Other Mother 65        cabg   • Heart disease Father    • Other Father 45        cabg   • Other Sister         BLOOD DISEASE    • Malig Hyperthermia Neg Hx           Allergies:  No Known Allergies     Scheduled Meds:    acetaminophen 650 mg Q6H   amiodarone 400 mg Q12H   aspirin 81 mg Daily   atorvastatin 40 mg Nightly   chlorhexidine 15 mL Q12H   enoxaparin 40 mg Q24H   furosemide 40 mg Daily   insulin lispro 0-7 Units 4x Daily With Meals & Nightly   metoprolol tartrate 25 mg Q12H   mupirocin 1 application BID   pantoprazole 40 mg Q AM   potassium chloride 20 mEq Daily   sennosides-docusate sodium 2 tablet Nightly       I have reviewed the patient's recent medical history and current medications, as well as personally reviewed/interpreted the telemetry/ECG data.      INTAKE AND OUTPUT:    Intake/Output Summary (Last 24 hours) at 7/15/2019 1133  Last data filed at 7/15/2019 0800  Gross per 24 hour   Intake 240 ml   Output 200 ml   Net 40 ml       Review of Systems: unchanged  GI: Denies abdominal pain and n/v/d  Cardiac: Denies chest pain and palpitations  Pulmonary: Denies shortness of breath and cough  /68 (BP Location: Right arm, Patient Position: Lying)   Pulse 68   Temp 97.9 °F (36.6 °C) (Oral)   Resp 18   Ht 175.3 cm (69.02\")   Wt 92.8 kg (204 lb 9.6 oz)   SpO2 95%   BMI 30.20 kg/m²   General appearance: No acute changes   Neck: Trachea midline; NECK, supple, no thyromegaly or lymphadenopathy   Lungs: Normal size and shape, normal breath sounds, equal distribution of air, no rales and rhonchi   CV: S1-S2 regular, no murmurs, no rub, no gallop   Abdomen: Soft, non-tender; no masses , no abnormal abdominal sounds   Extremities: No deformity , normal color , no peripheral " edema   Skin: Normal temperature, turgor and texture; no rash, ulcers          Cardiographics  Telemetry:   7/15 SR rate 70s      EC19 A paced  Rate 70s               SR rate 70s similar to prior tracing.         7/10 SR rate 60s        Echocardiogram:   19  Interpretation Summary     · Left atrial cavity size is borderline dilated.  · There is calcification of the aortic valve.  · Calculated EF = 60%.  · There is no evidence of pericardial effusion.            Stress test  19  Interpretation Summary        · Findings consistent with an abnormal ECG stress test.  · Left ventricular ejection fraction is normal (Calculated EF = 60%).  · Myocardial perfusion imaging indicates a small-sized, mildly severe area of ischemia located in the inferior wall.  · Impressions are consistent with an intermediate risk study.     Asymptomatic for chest pain.  ECG is positive for ischemia:  2 mm Horizontal ST segment Depression Inferior Leads (Only Lead III at baseline was non-specific T wave abnormality)  Borderline positive Lateral leads with beat to beat variability 1.5-1.5 mm Horizontal ST depression  (Baseline V6 was non-specific ST T wave abnormality)  BP response: hypertensive, Baseline 114/60, Peak 164/70, Recovery 164/70- 128/64  Recovery: 1 min 160/70                   2 min 150/70                   3 min 140/60                   4 min 144/60                   5 min 134/60                   6 min 128/64  Ectopy: Rare PVC with exercise and recovery, and rare PAC with exercise.  Exercise Tolerance: good and  reached 18 Of  20 Urmila Scale, Stage IV.  Unable to reach THR due to Beta-blocker therapy and fatigue. Changed to Walking Lexiscan.            Cardiac catheterization  7/3/19  Coronary Angiography      Left Main:  The left main originates in the left coronary cusp.  It bifurcates and gives rise to the LAD and circumflex system.  Distal left main 50% stenosis     Left Anterior Descending:  Left  anterior descending very proximal 70 to 80% stenosis midportion 50% stenosis, early atherosclerotic plaque of diagonal and  branches     Left Circumflex:  Circumflex artery codominant ostial 70% stenosis     Right Coronary Artery:  The right coronary artery originates in the right coronary cusp.  Right coronary artery proximal 60% stenosis, distally at the bifurcation 90% stenosis extending into the PDA and posterolateral branches     Left Ventriculography:       Estimated Ejection Fraction:         60 %   Wall motion abnormalities:  None   Mitral Regurgitation:  None      Impression:      1. Distal left main 50% stenosis  2. Left anterior descending very proximal 70 to 80% stenosis midportion 50% stenosis, early atherosclerotic plaque of diagonal and  branches  3. Circumflex artery codominant ostial 70% stenosis  4. Right coronary artery proximal 60% stenosis, distally at the bifurcation 90% stenosis extending into the PDA and posterolateral branches  5. Normal LV gram     Recommendations:      1. Will consider coronary artery bypass graft surgery       Lab Review       Results from last 7 days   Lab Units 07/12/19  0303   MAGNESIUM mg/dL 2.3     Results from last 7 days   Lab Units 07/15/19  0341   SODIUM mmol/L 135*   POTASSIUM mmol/L 3.8   BUN mg/dL 19   CREATININE mg/dL 0.94   CALCIUM mg/dL 9.0       Results from last 7 days   Lab Units 07/15/19  0341 07/14/19  0800 07/13/19  0343   WBC 10*3/mm3 9.42 10.70 10.78   HEMOGLOBIN g/dL 9.6* 9.8* 9.9*   HEMATOCRIT % 29.8* 31.3* 31.6*   PLATELETS 10*3/mm3 127* 109* 86*     Results from last 7 days   Lab Units 07/12/19  0303 07/11/19  1254 07/10/19  0758   INR  1.29* 1.42* 0.94   APTT seconds  --  29.8 28.8     Estimated Creatinine Clearance: 92.8 mL/min (by C-G formula based on SCr of 0.94 mg/dL).      I have reviewed the medical decision making with Dr. Perry in detail.       Assessment/plan    1.  CAD s/p CAB x 4 on 7/11  2.   "Hypertension  3.  Hyperlipidemia  4.  Post op ABL anemia    Pain control improved.  Encourage IS and ambulation as tolerated.  Now on PO diuresis, diuresing well. Wt now back to admission baseline. VS stable and WNL.  Continue maximum medical therapy for CAD. Hgb stable.  SR on telemetry today, temp wires discontinued per CTS    Continue to offer supportive care throughout post-op recovery.  Encouraged IS and ambulation.     )7/15/2019  MYLES Monsalve    Patient personally interviewed and above subjective findings personally confirmed during a face to face contact with patient today  All findings of physical examination confirmed  All pertinent and performed labs, cardiac procedures ,  radiographs of the last 24 hours personally reviewed  Impression and plans discussed/elaborated and implemented jointly as described above     Guera Perry MD            EMR Dragon/Transcription:   \"Dictated utilizing Dragon dictation\".   "

## 2019-07-15 NOTE — THERAPY TREATMENT NOTE
Acute Care - Physical Therapy Treatment Note  [facility]     Patient Name: Elijah Montero  : 1955  MRN: [id number]  Today's Date: 7/15/2019  Onset of Illness/Injury or Date of Surgery: 19          Admit Date: 2019    Visit Dx:    ICD-10-CM ICD-9-CM   1. Impaired mobility Z74.09 799.89   2. Coronary artery disease involving native coronary artery, angina presence unspecified, unspecified whether native or transplanted heart I25.10 414.01     Patient Active Problem List   Diagnosis   • Heart disease   • Hyperlipidemia   • Essential hypertension   • History of positive PPD   • Prediabetes   • Obesity (BMI 30-39.9)   • Coronary artery disease of native artery of native heart with stable angina pectoris (CMS/HCC)   • Precordial pain   • Abnormal cardiac function test   • Coronary artery disease involving native coronary artery       Therapy Treatment    Rehabilitation Treatment Summary     Row Name 07/15/19 1100             Treatment Time/Intention    Discipline  physical therapy assistant  -      Document Type  therapy note (daily note)  -      Subjective Information  complains of;weakness;fatigue  -      Care Plan Review  patient/other agree to care plan  -      Care Plan Review, Other Participant(s)  spouse  -      Comment  cues to slow pace-impulsive initially  -      Existing Precautions/Restrictions  fall;cardiac  -      Recorded by [] Sobia Macias PTA 07/15/19 1105      Row Name 07/15/19 1100             Bed Mobility Assessment/Treatment    Comment (Bed Mobility)  in chair  -      Recorded by [] Sobia Macias PTA 07/15/19 1113      Row Name 07/15/19 1100             Sit-Stand Transfer    Sit-Stand [place] (Transfers)  supervision;verbal cues cues to avoid pushing w/UEs  -      Recorded by [] Sobia Macias PTA 07/15/19 1113      Row Name 07/15/19 1100             Stand-Sit Transfer    Stand-Sit [place] (Transfers)  supervision;verbal cues  -   "    Recorded by [JM] Sobia Macias [facility] 07/15/19 1113      Row Name 07/15/19 1100             Gait/Stairs Assessment/Training    [place] Level (Gait)  contact guard  -      Assistive Device (Gait)  -- HHA  -      Distance in Feet (Gait)  200  -      [place] Level (Stairs)  contact guard;1 person assist;stand by assist  -      Handrail Location (Stairs)  right side (ascending)  -      Number of Steps (Stairs)  8  -JM      Ascending Technique (Stairs)  step-to-step  -JM      Descending Technique (Stairs)  step-to-step  -JM      Comment (Gait/Stairs)  2nd person to SBA for stair training due to pt has \"shaking episodes while amb\" per fam cues to slow pace, less shaky today  -      Recorded by [] Sobia Macias [facility] 07/15/19 1113      Row Name 07/15/19 1100             Therapeutic Exercise    Comment (Therapeutic Exercise)  cardiac exer x 10 reps educ for home exer before and after amb suggested  -      Recorded by [] Sobia Macias [facility] 07/15/19 1113      Row Name 07/15/19 1100             Positioning and Restraints    Pre-Treatment Position  sitting in chair/recliner  -JM      In Chair  reclined;call light within reach;encouraged to call for assist;with family/caregiver;notified nsg no alarm upon entry  -      Recorded by [] Sobia Macias [facility] 07/15/19 1113      Row Name 07/15/19 1100             Pain Scale: Numbers Pre/Post-Treatment    Pain Scale: Numbers, Pretreatment  2/10  -      Pain Scale: Numbers, Post-Treatment  0/10 - no pain  -      Pain Location - Orientation  incisional  -      Recorded by [JM] Sobia Macias [facility] 07/15/19 1113      Row Name                Wound 07/11/19 1151 chest incision    Wound - Properties Group Date first assessed: 07/11/19 [SR] Time first assessed: 1151 [SR] Location: chest [SR] Type: incision [SR] Recorded by:  [SR] Ruby Noe RN 07/11/19 1151    Row Name                Wound 07/11/19 1151 Right leg incision    Wound - " Properties Group Date first assessed: 07/11/19 [SR] Time first assessed: 1151 [SR] Side: Right [SR] Location: leg [SR] Type: incision [SR] Recorded by:  [SR] Ruby Noe RN 07/11/19 1151      User Key  (r) = Recorded By, (t) = Taken By, (c) = Cosigned By    Initials Name Effective Dates Discipline    Sobia Hazel, OSWALDO 03/07/18 -  PT    SR Ruby Noe RN 06/16/16 -  Nurse          Wound 07/11/19 1151 chest incision (Active)   Dressing Appearance open to air 7/15/2019  9:13 AM   Closure Approximated 7/15/2019  9:13 AM   Base pink;clean;dry 7/15/2019  9:13 AM   Drainage Amount none 7/15/2019  9:13 AM   Dressing Care, Wound open to air 7/15/2019  9:13 AM       Wound 07/11/19 1151 Right leg incision (Active)   Dressing Appearance dry;intact 7/15/2019  9:13 AM   Closure Open to air 7/15/2019  9:13 AM   Base clean;dry 7/15/2019  9:13 AM   Drainage Amount none 7/15/2019  9:13 AM   Dressing Care, Wound open to air 7/15/2019  9:13 AM           Physical Therapy Education     Title: PT OT SLP Therapies (Done)     Topic: Physical Therapy (Done)     Point: Mobility training (Done)     Learning Progress Summary           Patient Eager, E,TB,D, VU,NR by DESHAWN at 7/15/2019 11:15 AM    Comment:  educ on safety , slowing pace, not being over-achiever initially    Acceptance, E, VU by AL at 7/14/2019  9:54 AM    Acceptance, E, NR by EM at 7/13/2019 10:44 AM    Acceptance, E, NR by EM at 7/12/2019  9:41 AM   Family Eager, E,TB,D, VU,NR by DESHAWN at 7/15/2019 11:15 AM    Comment:  educ on safety , slowing pace, not being over-achiever initially                   Point: Home exercise program (Done)     Learning Progress Summary           Patient Eager, E,TB,D, VU,NR by DESHAWN at 7/15/2019 11:15 AM    Comment:  educ on safety , slowing pace, not being over-achiever initially    Acceptance, E, NR by EM at 7/13/2019 10:44 AM    JOSH Martinez NR by DARINEL at 7/12/2019  9:41 AM   Family Eager, E,ISA JENNINGS, JOCELYNE,NR by DESHAWN at 7/15/2019 11:15 AM    Comment:  " educ on safety , slowing pace, not being over-achiever initially                   Point: Body mechanics (Done)     Learning Progress Summary           Patient Eager, E,TB,D, VU,NR by  at 7/15/2019 11:15 AM    Comment:  educ on safety , slowing pace, not being over-achiever initially    Acceptance, E, VU by AL at 7/14/2019  9:54 AM   Family Eager, E,TB,D, VU,NR by  at 7/15/2019 11:15 AM    Comment:  educ on safety , slowing pace, not being over-achiever initially                   Point: Precautions (Done)     Learning Progress Summary           Patient Eager, E,TB,D, VU,NR by  at 7/15/2019 11:15 AM    Comment:  educ on safety , slowing pace, not being over-achiever initially    Acceptance, E, VU by AL at 7/14/2019  9:54 AM   Family Eager, E,TB,D, VU,NR by  at 7/15/2019 11:15 AM    Comment:  educ on safety , slowing pace, not being over-achiever initially                               User Key     Initials Effective Dates Name Provider Type Discipline    AL 04/03/18 -  Tisha Han, PT Physical Therapist PT    EM 04/03/18 -  Tati Edouard, PT Physical Therapist PT    JM 03/07/18 -  Sobia Macias PTA Physical Therapy Assistant PT                PT Recommendation and Plan     Plan of Care Reviewed With: patient, spouse  Progress: improving  Outcome Summary: still c/o wkness, but reports less \"shaky' today w/amb; cues to slow pace for amb and stairs; possibly home today/tomorrow  Outcome Measures     Row Name 07/15/19 1100 07/14/19 0900 07/13/19 1000       How much help from another person do you currently need...    Turning from your back to your side while in flat bed without using bedrails?  4  -JM  3  -AL  3  -EM    Moving from lying on back to sitting on the side of a flat bed without bedrails?  3  -JM  3  -AL  2  -EM    Moving to and from a bed to a chair (including a wheelchair)?  3  -JM  3  -AL  3  -EM    Standing up from a chair using your arms (e.g., wheelchair, bedside chair)?  3  -JM  3  " -AL  3  -EM    Climbing 3-5 steps with a railing?  3  -JM  2  -AL  2  -EM    To walk in hospital room?  3  -JM  3  -AL  3  -EM    AM-PAC 6 Clicks Score (PT)  19  -JM  17  -AL  16  -EM       Functional Assessment    Outcome Measure Options  --  AM-PAC 6 Clicks Basic Mobility (PT)  -AL  --      User Key  (r) = Recorded By, (t) = Taken By, (c) = Cosigned By    Initials Name Provider Type    Tisha Del Angel, PT Physical Therapist    EM Tati Edouard, PT Physical Therapist    Sobia Hazel PTA Physical Therapy Assistant         Time Calculation:   PT Charges     Row Name 07/15/19 1104             Time Calculation    Start Time  1017  -      Stop Time  1045  -DESHAWN      Time Calculation (min)  28 min  -DESHAWN      PT Received On  07/15/19  -DESHAWN      PT - Next Appointment  07/16/19  -DESHAWN        User Key  (r) = Recorded By, (t) = Taken By, (c) = Cosigned By    Initials Name Provider Type    Sobia Hazel PTA Physical Therapy Assistant        Therapy Charges for Today     Code Description Service Date Service Provider Modifiers Qty    00958388114 HC PT THER PROC EA 15 MIN 7/15/2019 Sobia Macias PTA GP 2    07863066183 HC PT THER SUPP EA 15 MIN 7/15/2019 Sobia Macias PTA GP 1          PT G-Codes  Outcome Measure Options: AM-PAC 6 Clicks Basic Mobility (PT)  AM-PAC 6 Clicks Score (PT): 19    Sobia Macias PTA  7/15/2019

## 2019-07-16 VITALS
HEIGHT: 69 IN | WEIGHT: 202 LBS | SYSTOLIC BLOOD PRESSURE: 107 MMHG | RESPIRATION RATE: 16 BRPM | HEART RATE: 69 BPM | TEMPERATURE: 98.2 F | OXYGEN SATURATION: 97 % | BODY MASS INDEX: 29.92 KG/M2 | DIASTOLIC BLOOD PRESSURE: 61 MMHG

## 2019-07-16 LAB
ANION GAP SERPL CALCULATED.3IONS-SCNC: 13.4 MMOL/L (ref 5–15)
BUN BLD-MCNC: 16 MG/DL (ref 8–23)
BUN/CREAT SERPL: 16.7 (ref 7–25)
CALCIUM SPEC-SCNC: 9.3 MG/DL (ref 8.6–10.5)
CHLORIDE SERPL-SCNC: 97 MMOL/L (ref 98–107)
CO2 SERPL-SCNC: 27.6 MMOL/L (ref 22–29)
CREAT BLD-MCNC: 0.96 MG/DL (ref 0.76–1.27)
GFR SERPL CREATININE-BSD FRML MDRD: 80 ML/MIN/1.73
GLUCOSE BLD-MCNC: 109 MG/DL (ref 65–99)
GLUCOSE BLDC GLUCOMTR-MCNC: 96 MG/DL (ref 70–130)
POTASSIUM BLD-SCNC: 3.7 MMOL/L (ref 3.5–5.2)
SODIUM BLD-SCNC: 138 MMOL/L (ref 136–145)

## 2019-07-16 PROCEDURE — 80048 BASIC METABOLIC PNL TOTAL CA: CPT | Performed by: THORACIC SURGERY (CARDIOTHORACIC VASCULAR SURGERY)

## 2019-07-16 PROCEDURE — 93010 ELECTROCARDIOGRAM REPORT: CPT | Performed by: INTERNAL MEDICINE

## 2019-07-16 PROCEDURE — 82962 GLUCOSE BLOOD TEST: CPT

## 2019-07-16 PROCEDURE — 93005 ELECTROCARDIOGRAM TRACING: CPT | Performed by: INTERNAL MEDICINE

## 2019-07-16 RX ORDER — HYDROCODONE BITARTRATE AND ACETAMINOPHEN 5; 325 MG/1; MG/1
1 TABLET ORAL EVERY 6 HOURS PRN
Qty: 50 TABLET | Refills: 0 | Status: SHIPPED | OUTPATIENT
Start: 2019-07-16 | End: 2019-07-31

## 2019-07-16 RX ORDER — FUROSEMIDE 40 MG/1
40 TABLET ORAL DAILY
Qty: 7 TABLET | Refills: 0 | Status: SHIPPED | OUTPATIENT
Start: 2019-07-17 | End: 2019-07-24

## 2019-07-16 RX ORDER — POTASSIUM CHLORIDE 750 MG/1
20 CAPSULE, EXTENDED RELEASE ORAL DAILY
Qty: 14 CAPSULE | Refills: 0 | Status: SHIPPED | OUTPATIENT
Start: 2019-07-17 | End: 2019-07-24

## 2019-07-16 RX ORDER — AMIODARONE HYDROCHLORIDE 200 MG/1
200 TABLET ORAL EVERY 12 HOURS SCHEDULED
Status: DISCONTINUED | OUTPATIENT
Start: 2019-07-16 | End: 2019-07-16 | Stop reason: HOSPADM

## 2019-07-16 RX ORDER — AMIODARONE HYDROCHLORIDE 200 MG/1
TABLET ORAL
Qty: 35 TABLET | Refills: 1 | Status: SHIPPED | OUTPATIENT
Start: 2019-07-16 | End: 2019-08-30 | Stop reason: HOSPADM

## 2019-07-16 RX ADMIN — AMIODARONE HYDROCHLORIDE 400 MG: 200 TABLET ORAL at 08:19

## 2019-07-16 RX ADMIN — PANTOPRAZOLE SODIUM 40 MG: 40 TABLET, DELAYED RELEASE ORAL at 06:07

## 2019-07-16 RX ADMIN — HYDROCODONE BITARTRATE AND ACETAMINOPHEN 1 TABLET: 5; 325 TABLET ORAL at 08:18

## 2019-07-16 RX ADMIN — ASPIRIN 81 MG: 81 TABLET, COATED ORAL at 08:18

## 2019-07-16 RX ADMIN — METOPROLOL TARTRATE 25 MG: 25 TABLET ORAL at 08:18

## 2019-07-16 RX ADMIN — FUROSEMIDE 40 MG: 40 TABLET ORAL at 08:18

## 2019-07-16 RX ADMIN — POTASSIUM CHLORIDE 20 MEQ: 750 CAPSULE, EXTENDED RELEASE ORAL at 08:18

## 2019-07-16 NOTE — PROGRESS NOTES
LOS: 5 days   Patient Care Team:  Nahomi Rae APRN as PCP - General (Family Medicine)  Guera Perry MD as Consulting Physician (Cardiology)    Chief Complaint:   Post-op follow-up, s/p CABG    Subjective  Up walking in room. No new complaints. Pain is controlled. Ready to go home.     Vital Signs  Temp:  [97.9 °F (36.6 °C)-98.7 °F (37.1 °C)] 98.2 °F (36.8 °C)  Heart Rate:  [63-79] 69  Resp:  [16-18] 16  BP: (105-145)/(61-75) 107/61      07/14/19  0600 07/15/19  0500 07/16/19  0500   Weight: 94.2 kg (207 lb 9.6 oz) 92.8 kg (204 lb 9.6 oz) 91.6 kg (202 lb)     Body mass index is 29.82 kg/m².    Intake/Output Summary (Last 24 hours) at 7/16/2019 0849  Last data filed at 7/16/2019 0818  Gross per 24 hour   Intake 480 ml   Output --   Net 480 ml     I/O this shift:  In: 240 [P.O.:240]  Out: -         Objective    Physical Exam:   General Appearance: awake and alert, no acute distress   Lungs: clear to auscultation, respirations regular and respirations unlabored   Heart: regular rhythm & normal rate, normal S1, S2 and without murmur   Abdomen: soft or nontender, + bowel sounds, +BM    Skin: sternal incision clean, dry, intact; EVH site c/d/i   Neuro: alert and oriented, no focal deficits.     Results Review:        WBC WBC   Date Value Ref Range Status   07/15/2019 9.42 3.40 - 10.80 10*3/mm3 Final   07/14/2019 10.70 3.40 - 10.80 10*3/mm3 Final      HGB Hemoglobin   Date Value Ref Range Status   07/15/2019 9.6 (L) 13.0 - 17.7 g/dL Final   07/14/2019 9.8 (L) 13.0 - 17.7 g/dL Final      HCT Hematocrit   Date Value Ref Range Status   07/15/2019 29.8 (L) 37.5 - 51.0 % Final   07/14/2019 31.3 (L) 37.5 - 51.0 % Final      Platelets Platelets   Date Value Ref Range Status   07/15/2019 127 (L) 140 - 450 10*3/mm3 Final   07/14/2019 109 (L) 140 - 450 10*3/mm3 Final        PT/INR:  No results found for: PROTIME/No results found for: INR    Sodium Sodium   Date Value Ref Range Status   07/16/2019 138 136 - 145  mmol/L Final   07/15/2019 135 (L) 136 - 145 mmol/L Final   07/14/2019 136 136 - 145 mmol/L Final      Potassium Potassium   Date Value Ref Range Status   07/16/2019 3.7 3.5 - 5.2 mmol/L Final   07/15/2019 3.8 3.5 - 5.2 mmol/L Final   07/14/2019 4.4 3.5 - 5.2 mmol/L Final      Chloride Chloride   Date Value Ref Range Status   07/16/2019 97 (L) 98 - 107 mmol/L Final   07/15/2019 98 98 - 107 mmol/L Final   07/14/2019 100 98 - 107 mmol/L Final      Bicarbonate CO2   Date Value Ref Range Status   07/16/2019 27.6 22.0 - 29.0 mmol/L Final   07/15/2019 26.1 22.0 - 29.0 mmol/L Final   07/14/2019 24.2 22.0 - 29.0 mmol/L Final      BUN BUN   Date Value Ref Range Status   07/16/2019 16 8 - 23 mg/dL Final   07/15/2019 19 8 - 23 mg/dL Final   07/14/2019 16 8 - 23 mg/dL Final      Creatinine Creatinine   Date Value Ref Range Status   07/16/2019 0.96 0.76 - 1.27 mg/dL Final   07/15/2019 0.94 0.76 - 1.27 mg/dL Final   07/14/2019 0.86 0.76 - 1.27 mg/dL Final      Calcium Calcium   Date Value Ref Range Status   07/16/2019 9.3 8.6 - 10.5 mg/dL Final   07/15/2019 9.0 8.6 - 10.5 mg/dL Final   07/14/2019 9.0 8.6 - 10.5 mg/dL Final      Magnesium No results found for: MG         acetaminophen 650 mg Oral Q6H   amiodarone 400 mg Oral Q12H   aspirin 81 mg Oral Daily   atorvastatin 40 mg Oral Nightly   enoxaparin 40 mg Subcutaneous Q24H   furosemide 40 mg Oral Daily   insulin lispro 0-7 Units Subcutaneous 4x Daily With Meals & Nightly   metoprolol tartrate 25 mg Oral Q12H   mupirocin 1 application Each Nare BID   pantoprazole 40 mg Oral Q AM   potassium chloride 20 mEq Oral Daily       nitroglycerin 5-200 mcg/min    sodium chloride 30 mL/hr Last Rate: 30 mL/hr (07/11/19 1310)   sodium chloride 30 mL/hr            Patient Active Problem List   Diagnosis Code   • Heart disease I51.9   • Hyperlipidemia E78.5   • Essential hypertension I10   • History of positive PPD R76.11   • Prediabetes R73.03   • Obesity (BMI 30-39.9) E66.9   • Coronary  artery disease of native artery of native heart with stable angina pectoris (CMS/[facility]) I25.118   • Precordial pain R07.2   • Abnormal cardiac function test R94.30   • Coronary artery disease involving native coronary artery I25.10       Assessment & Plan    CAD s/p CABG x4 with LIMA, right SVG EVH----POD #5 (Tsirigotis)  HTN---stable  HL----on statin  Post op anemia----expected blood loss, stable      Doing well.   Mobilize and encourage pulm toilet.   Plan discharge home with home health.     Benedicto Malloy PA-C  07/16/19  8:49 AM

## 2019-07-16 NOTE — PLAN OF CARE
Problem: Patient Care Overview  Goal: Plan of Care Review  Outcome: Ongoing (interventions implemented as appropriate)   07/16/19 9904   OTHER   Outcome Summary VSS. Pt had one episode of nausea. Treated with IV Zofran with positive results. Pt denies any vomiting or diarrhea. The only other complaint reported was back pain. Pt refused Tylenol dose.    Plan of Care Review   Progress improving   Coping/Psychosocial   Plan of Care Reviewed With patient     Goal: Individualization and Mutuality  Outcome: Ongoing (interventions implemented as appropriate)    Goal: Discharge Needs Assessment  Outcome: Ongoing (interventions implemented as appropriate)    Goal: Interprofessional Rounds/Family Conf  Outcome: Ongoing (interventions implemented as appropriate)      Problem: Cardiac Surgery (Adult)  Goal: Signs and Symptoms of Listed Potential Problems Will be Absent, Minimized or Managed (Cardiac Surgery)  Outcome: Ongoing (interventions implemented as appropriate)      Problem: Skin Injury Risk (Adult)  Goal: Identify Related Risk Factors and Signs and Symptoms  Outcome: Ongoing (interventions implemented as appropriate)    Goal: Skin Health and Integrity  Outcome: Ongoing (interventions implemented as appropriate)      Problem: Fall Risk (Adult)  Goal: Identify Related Risk Factors and Signs and Symptoms  Outcome: Ongoing (interventions implemented as appropriate)    Goal: Absence of Fall  Outcome: Ongoing (interventions implemented as appropriate)

## 2019-07-16 NOTE — DISCHARGE INSTR - ACTIVITY
Continue to use your incentive spirometer for an additional 2 weeks.  Continue to wear your DUNIA hose for an additional 2 weeks. You may remove them at night.  Walk 10 minutes at a time at least 3 times a day.  Do not drive for 2 weeks and no longer taking narcotics.  Do not lift, push or pull greater than 10 pounds for 6 weeks.  You may shower, but do not submerge your incisions until your surgeon approves (i.e., no baths, pools, hot tubs, etc.).  Clean your incision daily in the shower with Dial or Ivory soap. Do not put any additional lotions, creams, or any other substance on your incision without your surgeon's approval.

## 2019-07-16 NOTE — PROGRESS NOTES
Case Management Discharge Note    Final Note: Pt dc home with [facility] scheduled to follow.     Destination      No service has been selected for the patient.      Durable Medical Equipment      No service has been selected for the patient.      Dialysis/Infusion      No service has been selected for the patient.      Home Medical Care - Selection Complete      Service Provider Request Status Selected Services Address Phone Number Fax Number    [facility] Selected Home Health Services [address] [address] [phone]      Therapy      No service has been selected for the patient.      Community Resources      No service has been selected for the patient.        Transportation Services  Private: Car    Final Discharge Disposition Code: 06 - home with home health care

## 2019-07-16 NOTE — DISCHARGE SUMMARY
Date of Admission: 7/11/2016  Date of Discharge:  7/16/2019    Discharge Diagnosis:   CAD s/p CABG x4 with LIMA, right SVG EVH----POD #5 (Stef[facility])  HTN---stable  HL----on statin  Post op anemia----expected blood loss, stable     Presenting Problem/History of Present Illness  Coronary artery disease involving native coronary artery, angina presence unspecified, unspecified whether native or transplanted heart [I25.10]  Coronary artery disease involving native coronary artery, angina presence unspecified, unspecified whether native or transplanted heart [I25.10]     Hospital Course  Patient is a 63 y.o. male presented with above medical history. After having appropriate workup he was scheduled for surgery. On 7/11/19 he underwent CABGx4 by Dr. Mccall. Full details can be found in operative report. He tolerated the procedure well and was transferred to CVR.   He did have a few episodes of shivering with associated hypotension and drop in O2 sat. Treated with demerol, morphine, and propofol. He remained stable and was able to be extubated. On POD1 he was transferred to the stepdown unit. On POD2 chest tubes, central line, and payton were removed without issue. On POD3 epicardial wires were removed. He continued to progress as expected with increased activity, diuresis, and weaning of supplemental oxygen. On POD 5 he was ready for discharge. Plan to discharge home with home health. At time of discharge he was HD stable, ambulating well, off supplemental oxygen, and his bowels had moved. Patient and spouse were provided with appropriate discharge education. He was instructed to call office with any questions or concerns.       Procedures Performed  Procedure(s):  TYLOR STERNOTOMY CORONARY ARTERY BYPASS GRAFT TIMES 4 USING LEFT INTERNAL MAMMARY ARTERY AND RIGHT GREATER SAPHENOUS VEIN GRAFT PER ENDOSCOPIC VEIN HARVESTING AND PRP    Operative Note     Date of Dictation:                  07/11/19.     Date of Procedure:                 07/11/19.     Referring Physician:             Guera Perry MD.     Preoperative Diagnosis:      Multivessel CAD with stable angina.     Postoperative Diagnosis:    Same.     Case Status:                          Urgent.     Procedure:      1. CABG x 4 (LIMA to LAD, SVG to DIAG1, SVG to OM1, SVG to PDA).  2. EVH of the right leg.  3. TYLOR.        Consults:   Consults     Date and Time Order Name Status Description    7/11/2019 1243 Inpatient Cardiology Consult        Dr. Perry - cardiology     Pertinent Test Results:    Lab Results   Component Value Date    WBC 9.42 07/15/2019    HGB 9.6 (L) 07/15/2019    HCT 29.8 (L) 07/15/2019    MCV 98.7 (H) 07/15/2019     (L) 07/15/2019      Lab Results   Component Value Date    GLUCOSE 109 (H) 07/16/2019    CALCIUM 9.3 07/16/2019     07/16/2019    K 3.7 07/16/2019    CO2 27.6 07/16/2019    CL 97 (L) 07/16/2019    BUN 16 07/16/2019    CREATININE 0.96 07/16/2019    EGFRIFAFRI 107 03/29/2019    EGFRIFNONA 80 07/16/2019    BCR 16.7 07/16/2019    ANIONGAP 13.4 07/16/2019     Lab Results   Component Value Date    INR 1.29 (H) 07/12/2019    PROTIME 15.8 (H) 07/12/2019         Condition on Discharge:    stable    Vital Signs  Temp:  [97.9 °F (36.6 °C)-98.7 °F (37.1 °C)] 98.2 °F (36.8 °C)  Heart Rate:  [63-79] 69  Resp:  [16-18] 16  BP: (105-145)/(61-75) 107/61      Discharge Disposition  Home-Health Care Svc    Discharge Medications     Discharge Medications      New Medications      Instructions Start Date   amiodarone 200 MG tablet  Commonly known as:  PACERONE   Take 1 tablet by mouth 2 times a day x 5 days, then 1 tablet by mouth daily until follow-up with cardiology.      furosemide 40 MG tablet  Commonly known as:  LASIX   40 mg, Oral, Daily   Start Date:  7/17/2019     HYDROcodone-acetaminophen 5-325 MG per tablet  Commonly known as:  NORCO   1 tablet, Oral, Every 6 Hours PRN      metoprolol tartrate 25 MG tablet  Commonly known as:   LOPRESSOR   25 mg, Oral, Every 12 Hours Scheduled      potassium chloride 10 MEQ CR capsule  Commonly known as:  MICRO-K   20 mEq, Oral, Daily   Start Date:  7/17/2019        Changes to Medications      Instructions Start Date   nitroglycerin 0.4 MG SL tablet  Commonly known as:  NITROSTAT  What changed:    · how much to take  · how to take this  · when to take this  · reasons to take this  · additional instructions   1 under the tongue as needed for angina, may repeat q5mins for up three doses         Continue These Medications      Instructions Start Date   aspirin 81 MG EC tablet   81 mg, Oral, Every Morning      folic acid 400 MCG tablet  Commonly known as:  FOLVITE   400 mcg, Oral, Daily      saccharomyces boulardii 250 MG capsule  Commonly known as:  FLORASTOR   250 mg, Oral, Every Morning      VITAMIN B COMPLEX PO   1 tablet, Oral, Every Morning      vitamin C 250 MG tablet  Commonly known as:  ASCORBIC ACID   250 mg, Oral, Every Morning      ZOCOR 40 MG tablet  Generic drug:  simvastatin   40 mg, Oral, Nightly         Stop These Medications    Chlorhexidine Gluconate Cloth 2 % pads     metoprolol succinate XL 25 MG 24 hr tablet  Commonly known as:  TOPROL-XL     mupirocin 2 % nasal ointment  Commonly known as:  BACTROBAN     PERIDEX 0.12 % solution  Generic drug:  chlorhexidine     ramipril 10 MG capsule  Commonly known as:  ALTACE            Discharge Diet: heart healthy     Activity at Discharge:   Activity Instructions     Continue to use your incentive spirometer for an additional 2 weeks.  Continue to wear your DUNIA hose for an additional 2 weeks. You may remove them at night.  Walk 10 minutes at a time at least 3 times a day.  Do not drive for 2 weeks and no longer taking narcotics.  Do not lift, push or pull greater than 10 pounds for 6 weeks.  You may shower, but do not submerge your incisions until your surgeon approves (i.e., no baths, pools, hot tubs, etc.).  Clean your incision daily in the shower  with Dial or Ivory soap. Do not put any additional lotions, creams, or any other substance on your incision without your surgeon's approval.                   Follow-up Appointments  Future Appointments   Date Time Provider Department [place]   7/19/2019 10:00 AM Nahomi Rae APRN [facility] None   7/31/2019  2:15 PM Guera Perry MD [facility] None   8/14/2019  1:30 PM Balta Mg MD [facility] None     Additional Instructions for the Follow-ups that You Need to Schedule     Call MD With Problems / Concerns   As directed      Instructions:  Call office at [phone] for any drainage, increased redness, or fever over 100.5    Order Comments:  Instructions:  Call office at [phone] for any drainage, increased redness, or fever over 100.5          Discharge Follow-up with PCP   As directed       Currently Documented PCP:    Nahomi Rae APRN    PCP Phone Number:    [phone]     Follow Up Details:  in 1 week         Discharge Follow-up with Specialty: Cardiologist APRN/PA; 1 Week   As directed      Specialty:  Cardiologist APRN/PA    Follow Up:  1 Week    Follow Up Details:  bring all prescription bottles to appointment, call for appointment         Discharge Follow-up with Specified Provider: Cardiologist; 1 Month   As directed      To:  Cardiologist    Follow Up:  1 Month    Follow Up Details:  call for appointment, bring all medication bottles to appointment         Discharge Follow-up with Specified Provider: Dr. Mg   As directed      To:  Dr. Mg    Follow Up Details:  4-6 weeks, bring all current medications to appointment         Referral to Home Health   As directed      Face to Face Visit Date:  7/16/2019    Follow-up Provider for Plan of Care?:  I will be treating the patient on an ongoing basis.  Please send me the Plan of Care for signature.    Follow-up Provider:  BALTA MG [[id number]]    Reason/Clinical Findings:  post-op CABG     Describe mobility limitations that make leaving home difficult:  weakness    Nursing/Therapeutic Services Requested:  Skilled Nursing    Skilled nursing orders:  Post CABG care    Frequency:  1 Week 1               Test Results Pending at Discharge  None        Benedicto Malloy PA-C  07/16/19  11:25 AM

## 2019-07-17 ENCOUNTER — READMISSION MANAGEMENT (OUTPATIENT)
Dept: CALL CENTER | Facility: HOSPITAL | Age: 64
End: 2019-07-17

## 2019-07-17 LAB
ABO + RH BLD: NORMAL
ABO + RH BLD: NORMAL
BH BB BLOOD EXPIRATION DATE: NORMAL
BH BB BLOOD EXPIRATION DATE: NORMAL
BH BB BLOOD TYPE BARCODE: 5100
BH BB BLOOD TYPE BARCODE: 5100
BH BB DISPENSE STATUS: NORMAL
BH BB DISPENSE STATUS: NORMAL
BH BB PRODUCT CODE: NORMAL
BH BB PRODUCT CODE: NORMAL
BH BB UNIT NUMBER: NORMAL
BH BB UNIT NUMBER: NORMAL
CROSSMATCH INTERPRETATION: NORMAL
CROSSMATCH INTERPRETATION: NORMAL
UNIT  ABO: NORMAL
UNIT  ABO: NORMAL
UNIT  RH: NORMAL
UNIT  RH: NORMAL

## 2019-07-17 NOTE — OUTREACH NOTE
Prep Survey      Responses   Facility patient discharged from?  [place]   Is patient eligible?  Yes   Discharge diagnosis  CAD s/p CABG x4 with LIMA, right SVG EVH----POD #5 (Tsirigotis   Does the patient have one of the following disease processes/diagnoses(primary or secondary)?  Cardiothoracic surgery   Does the patient have Home health ordered?  Yes   What is the Home health agency?   [facility]    Is there a DME ordered?  No   Medication alerts for this patient  Pacerone, Lasix    Prep survey completed?  Yes          Nayeli Phillips RN

## 2019-07-19 ENCOUNTER — READMISSION MANAGEMENT (OUTPATIENT)
Dept: CALL CENTER | Facility: HOSPITAL | Age: 64
End: 2019-07-19

## 2019-07-19 NOTE — OUTREACH NOTE
CT Surgery Week 1 Survey      Responses   Facility patient discharged from?  [place]   Does the patient have one of the following disease processes/diagnoses(primary or secondary)?  Cardiothoracic surgery   Is there a successful TCM telephone encounter documented?  No   Week 1 attempt successful?  No   Unsuccessful attempts  Attempt 1            Diana Galeas RN

## 2019-07-22 ENCOUNTER — READMISSION MANAGEMENT (OUTPATIENT)
Dept: CALL CENTER | Facility: HOSPITAL | Age: 64
End: 2019-07-22

## 2019-07-22 NOTE — OUTREACH NOTE
CT Surgery Week 1 Survey      Responses   Facility patient discharged from?  [place]   Does the patient have one of the following disease processes/diagnoses(primary or secondary)?  Cardiothoracic surgery   Is there a successful TCM telephone encounter documented?  No   Week 1 attempt successful?  Yes   Call start time  1626   Call end time  1629   Discharge diagnosis  CAD s/p CABG x4 with LIMA, right SVG EVH----POD #5 (Tsirigotis   Is patient permission given to speak with other caregiver?  Yes   List who call center can speak with  wife   Meds reviewed with patient/caregiver?  Yes   Is the patient having any side effects they believe may be caused by any medication additions or changes?  No   Does the patient have all medications related to this admission filled (includes all antibiotics, pain medications, cardiac medications, etc.)  Yes   Is the patient taking all medications as directed (includes completed medication regime)?  Yes   Does the patient have a primary care provider?   Yes   Does the patient have an appointment scheduled with their C/T surgeon?  Yes   Has the patient kept scheduled appointments due by today?  N/A   What is the Home health agency?   [facility]    Has home health visited the patient within 72 hours of discharge?  Yes   Psychosocial issues?  No   Comments  Incision healing without s/sx of inf. Small amt of blood at bottom of incision wearing bandaid but that has stopped. Appetite is down. Pain is tolerable per pt. Staying hydration.    Did the patient receive a copy of their discharge instructions?  Yes   Nursing interventions  Reviewed instructions with patient   What is the patient's perception of their health status since discharge?  Improving   Nursing interventions  Nurse provided patient education   Is the patient/caregiver able to teach back normal signs of recovery?  Pain or discomfort at incisional site, Nausea and lack of appetite   Nursing interventions  Reassured on normal signs  of recovery   Is the patient /caregiver able to teach back basic post-op care?  Continue use of incentive spirometry at least 1 week post discharge, Drive as instructed by MD in discharge instructions, No tub bath, swimming, or hot tub until instructed by MD, Keep incision areas clean, dry and protected   Is the patient/caregiver able to teach back signs and symptoms of incisional infection?  Increased redness, swelling or pain at the incisonal site, Increased drainage or bleeding, Fever   Is the patient/caregiver able to teach back steps to recovery at home?  Rest and rebuild strength, gradually increase activity, Set small, achievable goals for return to baseline health, Practice good oral hygiene   Is the patient/caregiver able to teach back the hierarchy of who to call/visit for symptoms/problems? PCP, Specialist, Home health nurse, Urgent Care, ED, 911  Yes   Additional teach back comments  Encouraged IS usage.    Week 1 call completed?  Yes            Felisha Fajardo RN

## 2019-07-30 ENCOUNTER — READMISSION MANAGEMENT (OUTPATIENT)
Dept: CALL CENTER | Facility: HOSPITAL | Age: 64
End: 2019-07-30

## 2019-07-30 NOTE — OUTREACH NOTE
CT Surgery Week 1 Survey      Responses   Facility patient discharged from?  [place]   Does the patient have one of the following disease processes/diagnoses(primary or secondary)?  Cardiothoracic surgery   Call start time  1544   Call end time  1549   Discharge diagnosis  CAD s/p CABG x4 with LIMA, right SVG EVH----POD #5 (Tsirigotis   Meds reviewed with patient/caregiver?  Yes   Is the patient taking all medications as directed (includes completed medication regime)?  Yes   Has the patient kept scheduled appointments due by today?  N/A   Comments  Has appt coming up   What is the Home health agency?   [facility]    Home health comments  HH still coming in and his VS have been stable   Psychosocial issues?  No   Comments  has lost some weight but he will weigh daily   What is the patient's perception of their health status since discharge?  Improving   Nursing interventions  Nurse provided patient education   Is the patient/caregiver able to teach back normal signs of recovery?  Nausea and lack of appetite   Nursing interventions  Reassured on normal signs of recovery   Is the patient /caregiver able to teach back basic post-op care?  Take showers only when approved by MD-sponge bathe until then, No tub bath, swimming, or hot tub until instructed by MD, Keep incision areas clean, dry and protected, Lifting as instructed by MD in discharge instructions   Is the patient/caregiver able to teach back signs and symptoms of incisional infection?  Increased redness, swelling or pain at the incisonal site, Increased drainage or bleeding, Incisional warmth, Pus or odor from incision, Fever   Is the patient/caregiver able to teach back steps to recovery at home?  Set small, achievable goals for return to baseline health   Is the patient /caregiver able to teach back the importance of cardiac rehab?  Yes   Nursing interventions  Provided education on importance of cardiac rehab   Is the patient/caregiver able to teach back the  hierarchy of who to call/visit for symptoms/problems? PCP, Specialist, Home health nurse, Urgent Care, ED, 911  Yes   Additional teach back comments  He will start cardiac rehab once he speaks with MD Meredith Nicholas, RN

## 2019-07-31 ENCOUNTER — OFFICE VISIT (OUTPATIENT)
Dept: CARDIOLOGY | Age: 64
End: 2019-07-31

## 2019-07-31 VITALS
HEIGHT: 69 IN | WEIGHT: 193 LBS | SYSTOLIC BLOOD PRESSURE: 131 MMHG | DIASTOLIC BLOOD PRESSURE: 80 MMHG | HEART RATE: 69 BPM | BODY MASS INDEX: 28.58 KG/M2

## 2019-07-31 DIAGNOSIS — I25.118 CORONARY ARTERY DISEASE OF NATIVE ARTERY OF NATIVE HEART WITH STABLE ANGINA PECTORIS (HCC): ICD-10-CM

## 2019-07-31 DIAGNOSIS — R07.2 PRECORDIAL PAIN: ICD-10-CM

## 2019-07-31 DIAGNOSIS — E78.5 HYPERLIPIDEMIA, UNSPECIFIED HYPERLIPIDEMIA TYPE: ICD-10-CM

## 2019-07-31 DIAGNOSIS — I51.9 HEART DISEASE: Primary | ICD-10-CM

## 2019-07-31 DIAGNOSIS — R06.02 SHORTNESS OF BREATH: ICD-10-CM

## 2019-07-31 PROCEDURE — 99214 OFFICE O/P EST MOD 30 MIN: CPT | Performed by: INTERNAL MEDICINE

## 2019-08-05 ENCOUNTER — CLINICAL SUPPORT (OUTPATIENT)
Dept: CARDIAC SURGERY | Facility: CLINIC | Age: 64
End: 2019-08-05

## 2019-08-05 DIAGNOSIS — I10 ESSENTIAL (PRIMARY) HYPERTENSION: ICD-10-CM

## 2019-08-05 DIAGNOSIS — Z95.1 PRESENCE OF AORTOCORONARY BYPASS GRAFT: ICD-10-CM

## 2019-08-05 DIAGNOSIS — I25.10 ATHSCL HEART DISEASE OF NATIVE CORONARY ARTERY W/O ANG PCTRS: ICD-10-CM

## 2019-08-05 DIAGNOSIS — Z48.812 ENCOUNTER FOR SURGICAL AFTERCARE FOLLOWING SURGERY OF CIRCULATORY SYSTEM: Primary | ICD-10-CM

## 2019-08-05 PROCEDURE — G0180 MD CERTIFICATION HHA PATIENT: HCPCS | Performed by: THORACIC SURGERY (CARDIOTHORACIC VASCULAR SURGERY)

## 2019-08-06 ENCOUNTER — READMISSION MANAGEMENT (OUTPATIENT)
Dept: CALL CENTER | Facility: HOSPITAL | Age: 64
End: 2019-08-06

## 2019-08-06 NOTE — OUTREACH NOTE
CT Surgery Week 3 Survey      Responses   Facility patient discharged from?  [place]   Does the patient have one of the following disease processes/diagnoses(primary or secondary)?  Cardiothoracic surgery   Week 3 attempt successful?  No   Unsuccessful attempts  Attempt 1          Augusta Wilkinson RN

## 2019-08-07 ENCOUNTER — READMISSION MANAGEMENT (OUTPATIENT)
Dept: CALL CENTER | Facility: HOSPITAL | Age: 64
End: 2019-08-07

## 2019-08-07 NOTE — OUTREACH NOTE
CT Surgery Week 3 Survey      Responses   Facility patient discharged from?  [place]   Does the patient have one of the following disease processes/diagnoses(primary or secondary)?  Cardiothoracic surgery   Week 3 attempt successful?  Yes   Call start time  1425   Call end time  1429   Discharge diagnosis  CAD s/p CABG x4 with LIMA, right SVG EVH----POD #5 (Tsirigotis   Meds reviewed with patient/caregiver?  Yes   Is the patient having any side effects they believe may be caused by any medication additions or changes?  No   Does the patient have all medications related to this admission filled (includes all antibiotics, pain medications, cardiac medications, etc.)  Yes   Is the patient taking all medications as directed (includes completed medication regime)?  Yes   Does the patient have a primary care provider?   Yes   Does the patient have an appointment scheduled with their C/T surgeon?  Yes   Has the patient kept scheduled appointments due by today?  Yes   Home health comments  Has been released from    Psychosocial issues?  No   Did the patient receive a copy of their discharge instructions?  Yes   Nursing interventions  Reviewed instructions with patient   What is the patient's perception of their health status since discharge?  Improving   Week 3 call completed?  Yes          Augusta Holt, NABIL

## 2019-08-12 ENCOUNTER — OFFICE VISIT (OUTPATIENT)
Dept: CARDIAC REHAB | Facility: HOSPITAL | Age: 64
End: 2019-08-12

## 2019-08-12 VITALS
BODY MASS INDEX: 29.22 KG/M2 | HEIGHT: 69 IN | SYSTOLIC BLOOD PRESSURE: 130 MMHG | OXYGEN SATURATION: 97 % | HEART RATE: 72 BPM | WEIGHT: 197.3 LBS | DIASTOLIC BLOOD PRESSURE: 80 MMHG

## 2019-08-12 DIAGNOSIS — Z95.1 STATUS POST FOUR VESSEL CORONARY ARTERY BYPASS: Primary | ICD-10-CM

## 2019-08-12 PROCEDURE — 93797 PHYS/QHP OP CAR RHAB WO ECG: CPT

## 2019-08-12 NOTE — PROGRESS NOTES
"Cardiac Rehab Initial Assessment      Name: Elijah Montero  :1955 Allergies:Patient has no known allergies.   MRN: [id number] 63 y.o. Physician: Nahomi Rae APRN   Primary Diagnosis:    Diagnosis Plan   1. Status post four vessel coronary artery bypass      Event Date: 2019 Specialist: Vicky Mccall cardiovascular surgeon   Secondary Diagnosis:  Risk Stratification:Moderate Risk Note Author: Michelle Saleem RN     Cardiovascular History: Previous PCI and stent about 20 years ago.       EXERCISE AT HOME  Yes walking outside  30min to 60 min  7 days per week, every 4th day pt walks 5 miles    EF: 60%      Source: echo 2019          Ambulatory Status:Independent  Ambulatory Fall Risk Assessed on Initial Visit: yes 6 Minute Walk Pre- Cardiac Rehab:  Distance:1681ft      RPE:3  Max. HR: 90       SPO2:    MET: 3.5  MPH: 3.2             RPD: 0  Resting BP: 130/80 LA, 120/80 RA    Peak BP: 140/80  Recovery BP: 120/80  Comments: Walked full 6 minutes without stopping.  Normal gait and balance.  Pt thinks he could walk same pace and more time.  Pt mentions at end of walk that he had an episode where he \"passed out\" last week.  Then this morning woke feeling light headed and that has stayed the same all day.  Denies walk made it worse.  Said all day he has felt a little nauseated though.  Advised to let his cardiologist know about last week's incident and today.   Had several minutes where oximeter would not  a reading.       NUTRITION  Lipids:yes If yes, labs as follows;  Total: No components found for: CHOLESTEROL  HDL:   HDL Cholesterol   Date Value Ref Range Status   07/10/2019 48 40 - 60 mg/dL Final    Lipids continued:  LDL:  LDL Cholesterol    Date Value Ref Range Status   07/10/2019 68 0 - 100 mg/dL Final     Triglyceride: No components found for: TRIGLYCERIDE   Weight Management:                 Weight: 197.3 lb  Height: 69 in                                   " "BMI: Body mass index is 29.14 kg/m².  Waist Circumference: 41  inches   Alcohol Use: none lately Diabetes:No    Last HGBA1C with date if applicable:No components found for: A1C         SOCIAL HISTORY  Social History     Socioeconomic History   • Marital status:      Spouse name: Not on file   • Number of children: Not on file   • Years of education: Not on file   • Highest education level: Not on file   Tobacco Use   • Smoking status: Former Smoker     Packs/day: 1.00     Years: 13.00     Pack years: 13.00     Types: Cigarettes   • Smokeless tobacco: Never Used   • Tobacco comment: QUIT AGE 29   Substance and Sexual Activity   • Alcohol use: Yes     Comment: THREE TIMES WEEK, BEER   • Drug use: No   • Sexual activity: Defer       Educational Level (choose one that applies) college graduate  BBA in management.  Pt is self employed with a wrapping business. Learning Barriers:Ready to Learn    Family Support:yes    Living Arrangement: lives with their spouse    Risk Factors: Stress  No, Clinical Depression  No, Heredity  Yes If Yes parents with CABG, Hyperlipidemia  Yes, Exercise prior to event  Yes If Yes: Activity EFX, softball and golf, walking outside, Minutes per day 30-60, Days per week 7 (On the fourth day pt walks 5 miles).  Pt was also running 3 miles 4 days a week before his heart got so bad. Obesity  No    Not diabetic Tobacco Adjunct: No  Quit smoking at age 29.  Smoked one pack per day since age 15      Comorbidities: No other health issues but heart.  Once in a while might have reflux       At end of 6 MWT pt revealed he had an episode last week where he, \"passed out\".  Says he was sitting in his garage talking with a friend when it happened.  Pt said he did not fall.  Discussed more with him and he revealed he had walked 4 miles that day, went to gym?, went to movies and did some activities around the house.  Advised pt to let Dr. Perry know about his syncopal spell.  Pt also tells me today " "he awoke in bed feeling light headed and has been that way all day.  He has not passed out.  Says he has felt a little nauseated too.  Discussed NOT doing so much exercise and activity everyday and especially on the days he attends cardiac rehab.  Also advised pt to watch the heat and humidity and to hydrate which he says he does.      Pt reports having some \"drainage\" and a cough for the past 1 1/2 days.  Says his son was sick with something.  Denies fever.       PSYCHOSOCIAL  Clinical Depression: no    Stress: no     Assess presence or absence of depression using a valid screening tool: yes Pt is going to [place] from 8/14/2019 through 8/21.  He won't start exercise until Friday, August 23 at 9 am.     PHYSICAL ASSESSMENT  Influenza vaccine: no  Pneumococcal vaccine: no          Angina: no    Describe angina scale of 0 - 4: 0 = none    Today are you having incisional pain? No. If, Yes, Scale:     Just states incision itches and either side of chest is still a little sore and numb    Today are you having any other pain? No. If, Yes, Scale:      Diagnosed with Hypertension:yes    Heart Sounds: S1 S2 regular     Lung Sounds: normal air entry, lungs clear to auscultation         Assessment: Alert and appropriate to discussion.  Pt s/w quiet and soft spoken.  Skin tanned and warm and dry.  Orthopedic Problems: No orthopedic problems    Are you being hurt, hit, or frightened by anyone at home or in your life? no    Are you being neglected by a caregiver? N/A Shoulder flexibility/Range of motion: Above average     Recommended arm activity: Any    Chair sit and reach within: 3 inches   Leg flexibility: Average    Leg Strength/Balance/Five times sit to stand: 9 seconds.     Chose one: Average    Recommended stretching: Standing    Assessment: Midline incision intact and healed.     Family attends IA: no Time of arrival: 1345  Time of departure: 1500     Patient Goals: MET 6-7  Return to taking care of farm, home, swimming " pool, and rental properties:  Mowing, trimming, cutting limbs.  Return to playing golf, using EFX, walking 1-2 miles per day for 6 days and walking 5 miles one day a week. Return to running 3 miles 4 days a week.  Lose 5-10 lbs in program with goal weight of 187 lbs.           8/12/2019  1:55 PM  Michelle Saleem RN

## 2019-08-14 ENCOUNTER — OFFICE VISIT (OUTPATIENT)
Dept: CARDIAC SURGERY | Facility: CLINIC | Age: 64
End: 2019-08-14

## 2019-08-14 VITALS
WEIGHT: 196.8 LBS | OXYGEN SATURATION: 100 % | BODY MASS INDEX: 29.15 KG/M2 | DIASTOLIC BLOOD PRESSURE: 79 MMHG | TEMPERATURE: 97.6 F | HEIGHT: 69 IN | SYSTOLIC BLOOD PRESSURE: 131 MMHG | HEART RATE: 63 BPM | RESPIRATION RATE: 18 BRPM

## 2019-08-14 DIAGNOSIS — I25.118 CORONARY ARTERY DISEASE OF NATIVE ARTERY OF NATIVE HEART WITH STABLE ANGINA PECTORIS (HCC): Primary | ICD-10-CM

## 2019-08-14 PROCEDURE — 99024 POSTOP FOLLOW-UP VISIT: CPT | Performed by: THORACIC SURGERY (CARDIOTHORACIC VASCULAR SURGERY)

## 2019-08-14 NOTE — PROGRESS NOTES
Cardiothoracic Post-Op Visit    Date of Visit:   8/14/2019    Primary Care Physician: Nahomi Rae APRN    Cardiologist:   Guera Perry MD     Chief Complaint:  Routine post-op visit.    History of Present Illness:    Dear Colleagues,    I had the pleasure of seeing Elijah Montero in the office following his CABG x 4 on 2019/7/11.    He reports that he is recovering very well.    He denies chest pain and shortness of breath.    He complains of one episode of sudden and very brief syncope about 10 days ago. There were no associated symptoms. No palpitations or shortness of breath. No preceding lightheadedness. Only fatigue after being very active that day. He reports only one episode of lightheadedness a few days after that which was self-limited.    Review of Systems:    Constitutional:  Negative for fevers, chills, diaphoresis.  Cardiovascular: Negative for orthopnea, PND, lower extremity edema,palpitations, chest pain.  Gastrointestinal: Negative for nausea, vomiting, diarrhea, abdominal pain.  Pulmonary:  Negative cough, wheezing, exertional dyspnea.   Neurological:  Negative for dizziness. Positive for light-headedness, syncope.  Genitourinary:  Negative for dysuria, difficulty urinating.  Musculoskeletal: Negative.  Skin:   Negative.  Hematologic:  Negative.  Phychiatric:  Negative.    His relevant past medical history, past surgical history, social history, family history allergies and medications have been previously documented in the medical record and are unchanged unless specifically noted.    Physical Exam:    Vitals:  Reviewed and within normal limits. Body mass index is 29.06 kg/m²., Weight: 89.3 kg (196 lb 12.8 oz), Temp: 97.6 °F (36.4 °C), Heart Rate: 63, BP: 131/79  General: No apparent distress, conversant.   HEENT: Atraumatic, anicetric sclerae, oropharynx clear with moist mucous membranes.  Neck:  Trachea midline, normal ROM, supple, no lymphadenopathy or  thyromagaly.  Lungs:  CTA, normal respiratory effort, no intercostal retractions, no wheezes.  CV:  Regular rate and rhythm, no murmurs.   Abdomen: Soft, non-tender, no masses, no HSM.  Extremities:  No peripheral edema or lymphadenopathy, normal ROM, no sign of venous varicosities.  Skin:   Warm, dry, normal turgor and texture, no rashes. Incisions clean, dry, intact and well-healed.  Psych:  Appropriate affect, alert and oriented to person, place and time.  Sternum: Stable to palpation. No clicking or shifting.    Assessment:    63 y.o. male s/p CABG x 4.    He is recovering very well.    Recommendation/Plan:    Sternal precautions lifted.    He is clear to proceed with cardiac rehab as desired.    Continued routine follow-up with Cardiologists and Primary Care Provider.    I have recommended to him that he follow up with his cardiologist regarding this syncopal episode to possibly rule out some arrhythmic cause, although this seems like an unlikely possibility at this stage of his recovery.    No further surgical intervention required at this time.    Thank you for allowing me to participate in his care.      Best regards,    Gael Mccall MD, PhD  08/14/19  9:46 AM

## 2019-08-15 ENCOUNTER — READMISSION MANAGEMENT (OUTPATIENT)
Dept: CALL CENTER | Facility: HOSPITAL | Age: 64
End: 2019-08-15

## 2019-08-15 PROBLEM — R06.02 SHORTNESS OF BREATH: Status: ACTIVE | Noted: 2019-08-15

## 2019-08-15 NOTE — OUTREACH NOTE
CT Surgery Week 4 Survey      Responses   Facility patient discharged from?  [facility]   Does the patient have one of the following disease processes/diagnoses(primary or secondary)?  Cardiothoracic surgery   Week 4 attempt successful?  No          Meredith Nicholas RN

## 2019-08-27 ENCOUNTER — HOSPITAL ENCOUNTER (OUTPATIENT)
Facility: HOSPITAL | Age: 64
Discharge: HOME | End: 2019-08-30
Attending: EMERGENCY MEDICINE | Admitting: INTERNAL MEDICINE

## 2019-08-27 ENCOUNTER — APPOINTMENT (OUTPATIENT)
Dept: GENERAL RADIOLOGY | Facility: HOSPITAL | Age: 64
End: 2019-08-27

## 2019-08-27 DIAGNOSIS — R07.2 PRECORDIAL CHEST PAIN: Primary | ICD-10-CM

## 2019-08-27 LAB
ALBUMIN SERPL-MCNC: 4.5 G/DL (ref 3.5–5.2)
ALBUMIN/GLOB SERPL: 1.7 G/DL
ALP SERPL-CCNC: 75 U/L (ref 39–117)
ALT SERPL W P-5'-P-CCNC: 18 U/L (ref 1–41)
ANION GAP SERPL CALCULATED.3IONS-SCNC: 12.6 MMOL/L (ref 5–15)
AST SERPL-CCNC: 19 U/L (ref 1–40)
BASOPHILS # BLD AUTO: 0.04 10*3/MM3 (ref 0–0.2)
BASOPHILS NFR BLD AUTO: 0.6 % (ref 0–1.5)
BILIRUB SERPL-MCNC: 0.2 MG/DL (ref 0.2–1.2)
BUN BLD-MCNC: 13 MG/DL (ref 8–23)
BUN/CREAT SERPL: 14.1 (ref 7–25)
CALCIUM SPEC-SCNC: 9.6 MG/DL (ref 8.6–10.5)
CHLORIDE SERPL-SCNC: 100 MMOL/L (ref 98–107)
CO2 SERPL-SCNC: 26.4 MMOL/L (ref 22–29)
CREAT BLD-MCNC: 0.92 MG/DL (ref 0.76–1.27)
DEPRECATED RDW RBC AUTO: 48.9 FL (ref 37–54)
EOSINOPHIL # BLD AUTO: 0.2 10*3/MM3 (ref 0–0.4)
EOSINOPHIL NFR BLD AUTO: 3.2 % (ref 0.3–6.2)
ERYTHROCYTE [DISTWIDTH] IN BLOOD BY AUTOMATED COUNT: 13.3 % (ref 12.3–15.4)
GFR SERPL CREATININE-BSD FRML MDRD: 84 ML/MIN/1.73
GLOBULIN UR ELPH-MCNC: 2.7 GM/DL
GLUCOSE BLD-MCNC: 101 MG/DL (ref 65–99)
HCT VFR BLD AUTO: 43.5 % (ref 37.5–51)
HGB BLD-MCNC: 13.3 G/DL (ref 13–17.7)
IMM GRANULOCYTES # BLD AUTO: 0.04 10*3/MM3 (ref 0–0.05)
IMM GRANULOCYTES NFR BLD AUTO: 0.6 % (ref 0–0.5)
LYMPHOCYTES # BLD AUTO: 1.9 10*3/MM3 (ref 0.7–3.1)
LYMPHOCYTES NFR BLD AUTO: 30.2 % (ref 19.6–45.3)
MCH RBC QN AUTO: 30.2 PG (ref 26.6–33)
MCHC RBC AUTO-ENTMCNC: 30.6 G/DL (ref 31.5–35.7)
MCV RBC AUTO: 98.9 FL (ref 79–97)
MONOCYTES # BLD AUTO: 0.91 10*3/MM3 (ref 0.1–0.9)
MONOCYTES NFR BLD AUTO: 14.5 % (ref 5–12)
NEUTROPHILS # BLD AUTO: 3.2 10*3/MM3 (ref 1.7–7)
NEUTROPHILS NFR BLD AUTO: 50.9 % (ref 42.7–76)
NRBC BLD AUTO-RTO: 0 /100 WBC (ref 0–0.2)
PLATELET # BLD AUTO: 182 10*3/MM3 (ref 140–450)
PMV BLD AUTO: 9.9 FL (ref 6–12)
POTASSIUM BLD-SCNC: 4.1 MMOL/L (ref 3.5–5.2)
PROT SERPL-MCNC: 7.2 G/DL (ref 6–8.5)
RBC # BLD AUTO: 4.4 10*6/MM3 (ref 4.14–5.8)
SODIUM BLD-SCNC: 139 MMOL/L (ref 136–145)
TROPONIN T SERPL-MCNC: <0.01 NG/ML (ref 0–0.03)
WBC NRBC COR # BLD: 6.29 10*3/MM3 (ref 3.4–10.8)

## 2019-08-27 PROCEDURE — 85025 COMPLETE CBC W/AUTO DIFF WBC: CPT | Performed by: EMERGENCY MEDICINE

## 2019-08-27 PROCEDURE — 71046 X-RAY EXAM CHEST 2 VIEWS: CPT

## 2019-08-27 PROCEDURE — 85379 FIBRIN DEGRADATION QUANT: CPT | Performed by: EMERGENCY MEDICINE

## 2019-08-27 PROCEDURE — 93005 ELECTROCARDIOGRAM TRACING: CPT | Performed by: EMERGENCY MEDICINE

## 2019-08-27 PROCEDURE — 93005 ELECTROCARDIOGRAM TRACING: CPT

## 2019-08-27 PROCEDURE — 99284 EMERGENCY DEPT VISIT MOD MDM: CPT

## 2019-08-27 PROCEDURE — 84484 ASSAY OF TROPONIN QUANT: CPT | Performed by: EMERGENCY MEDICINE

## 2019-08-27 PROCEDURE — 80053 COMPREHEN METABOLIC PANEL: CPT | Performed by: EMERGENCY MEDICINE

## 2019-08-27 PROCEDURE — G0378 HOSPITAL OBSERVATION PER HR: HCPCS

## 2019-08-27 PROCEDURE — 93010 ELECTROCARDIOGRAM REPORT: CPT | Performed by: INTERNAL MEDICINE

## 2019-08-27 RX ORDER — ASPIRIN 325 MG
325 TABLET ORAL ONCE
Status: COMPLETED | OUTPATIENT
Start: 2019-08-27 | End: 2019-08-27

## 2019-08-27 RX ORDER — SODIUM CHLORIDE 0.9 % (FLUSH) 0.9 %
10 SYRINGE (ML) INJECTION AS NEEDED
Status: DISCONTINUED | OUTPATIENT
Start: 2019-08-27 | End: 2019-08-30 | Stop reason: HOSPADM

## 2019-08-27 RX ADMIN — ASPIRIN 325 MG: 325 TABLET ORAL at 23:47

## 2019-08-28 ENCOUNTER — APPOINTMENT (OUTPATIENT)
Dept: NUCLEAR MEDICINE | Facility: HOSPITAL | Age: 64
End: 2019-08-28

## 2019-08-28 ENCOUNTER — APPOINTMENT (OUTPATIENT)
Dept: CARDIOLOGY | Facility: HOSPITAL | Age: 64
End: 2019-08-28

## 2019-08-28 ENCOUNTER — APPOINTMENT (OUTPATIENT)
Dept: CT IMAGING | Facility: HOSPITAL | Age: 64
End: 2019-08-28

## 2019-08-28 PROBLEM — R07.2 PRECORDIAL CHEST PAIN: Status: ACTIVE | Noted: 2019-08-28

## 2019-08-28 LAB
AORTIC DIMENSIONLESS INDEX: 0.6 (DI)
BH CV ECHO MEAS - AO MAX PG (FULL): 5.9 MMHG
BH CV ECHO MEAS - AO MAX PG: 8.8 MMHG
BH CV ECHO MEAS - AO MEAN PG (FULL): 3 MMHG
BH CV ECHO MEAS - AO MEAN PG: 5 MMHG
BH CV ECHO MEAS - AO ROOT AREA (BSA CORRECTED): 1.4
BH CV ECHO MEAS - AO ROOT AREA: 7.1 CM^2
BH CV ECHO MEAS - AO ROOT DIAM: 3 CM
BH CV ECHO MEAS - AO V2 MAX: 148 CM/SEC
BH CV ECHO MEAS - AO V2 MEAN: 111 CM/SEC
BH CV ECHO MEAS - AO V2 VTI: 33.4 CM
BH CV ECHO MEAS - AVA(I,A): 2.2 CM^2
BH CV ECHO MEAS - AVA(I,D): 2.2 CM^2
BH CV ECHO MEAS - AVA(V,A): 2.2 CM^2
BH CV ECHO MEAS - AVA(V,D): 2.2 CM^2
BH CV ECHO MEAS - BSA(HAYCOCK): 2.1 M^2
BH CV ECHO MEAS - BSA: 2.1 M^2
BH CV ECHO MEAS - BZI_BMI: 29.7 KILOGRAMS/M^2
BH CV ECHO MEAS - BZI_METRIC_HEIGHT: 175.3 CM
BH CV ECHO MEAS - BZI_METRIC_WEIGHT: 91.2 KG
BH CV ECHO MEAS - EDV(CUBED): 110.6 ML
BH CV ECHO MEAS - EDV(MOD-SP2): 106 ML
BH CV ECHO MEAS - EDV(MOD-SP4): 121 ML
BH CV ECHO MEAS - EDV(TEICH): 107.5 ML
BH CV ECHO MEAS - EF(CUBED): 61.2 %
BH CV ECHO MEAS - EF(MOD-BP): 54 %
BH CV ECHO MEAS - EF(MOD-SP2): 53.8 %
BH CV ECHO MEAS - EF(MOD-SP4): 53.7 %
BH CV ECHO MEAS - EF(TEICH): 52.7 %
BH CV ECHO MEAS - ESV(CUBED): 42.9 ML
BH CV ECHO MEAS - ESV(MOD-SP2): 49 ML
BH CV ECHO MEAS - ESV(MOD-SP4): 56 ML
BH CV ECHO MEAS - ESV(TEICH): 50.9 ML
BH CV ECHO MEAS - FS: 27.1 %
BH CV ECHO MEAS - IVS/LVPW: 0.9
BH CV ECHO MEAS - IVSD: 0.9 CM
BH CV ECHO MEAS - LAT PEAK E' VEL: 9.6 CM/SEC
BH CV ECHO MEAS - LV DIASTOLIC VOL/BSA (35-75): 58.5 ML/M^2
BH CV ECHO MEAS - LV MASS(C)D: 158.8 GRAMS
BH CV ECHO MEAS - LV MASS(C)DI: 76.7 GRAMS/M^2
BH CV ECHO MEAS - LV MAX PG: 2.9 MMHG
BH CV ECHO MEAS - LV MEAN PG: 2 MMHG
BH CV ECHO MEAS - LV SYSTOLIC VOL/BSA (12-30): 27.1 ML/M^2
BH CV ECHO MEAS - LV V1 MAX: 85 CM/SEC
BH CV ECHO MEAS - LV V1 MEAN: 57 CM/SEC
BH CV ECHO MEAS - LV V1 VTI: 19.4 CM
BH CV ECHO MEAS - LVIDD: 4.8 CM
BH CV ECHO MEAS - LVIDS: 3.5 CM
BH CV ECHO MEAS - LVLD AP2: 8.1 CM
BH CV ECHO MEAS - LVLD AP4: 8.4 CM
BH CV ECHO MEAS - LVLS AP2: 7.4 CM
BH CV ECHO MEAS - LVLS AP4: 7.2 CM
BH CV ECHO MEAS - LVOT AREA (M): 3.8 CM^2
BH CV ECHO MEAS - LVOT AREA: 3.8 CM^2
BH CV ECHO MEAS - LVOT DIAM: 2.2 CM
BH CV ECHO MEAS - LVPWD: 1 CM
BH CV ECHO MEAS - MED PEAK E' VEL: 5.1 CM/SEC
BH CV ECHO MEAS - MV A DUR: 0.09 SEC
BH CV ECHO MEAS - MV A MAX VEL: 77.1 CM/SEC
BH CV ECHO MEAS - MV DEC SLOPE: 307 CM/SEC^2
BH CV ECHO MEAS - MV DEC TIME: 251 SEC
BH CV ECHO MEAS - MV E MAX VEL: 65 CM/SEC
BH CV ECHO MEAS - MV E/A: 0.84
BH CV ECHO MEAS - MV MAX PG: 2.9 MMHG
BH CV ECHO MEAS - MV MEAN PG: 1 MMHG
BH CV ECHO MEAS - MV P1/2T MAX VEL: 72.9 CM/SEC
BH CV ECHO MEAS - MV P1/2T: 69.6 MSEC
BH CV ECHO MEAS - MV V2 MAX: 84.5 CM/SEC
BH CV ECHO MEAS - MV V2 MEAN: 53.8 CM/SEC
BH CV ECHO MEAS - MV V2 VTI: 23.5 CM
BH CV ECHO MEAS - MVA P1/2T LCG: 3 CM^2
BH CV ECHO MEAS - MVA(P1/2T): 3.2 CM^2
BH CV ECHO MEAS - MVA(VTI): 3.1 CM^2
BH CV ECHO MEAS - PA ACC TIME: 0.1 SEC
BH CV ECHO MEAS - PA MAX PG (FULL): 1.9 MMHG
BH CV ECHO MEAS - PA MAX PG: 3.5 MMHG
BH CV ECHO MEAS - PA PR(ACCEL): 32.7 MMHG
BH CV ECHO MEAS - PA V2 MAX: 93.7 CM/SEC
BH CV ECHO MEAS - RAP SYSTOLE: 3 MMHG
BH CV ECHO MEAS - RV MAX PG: 1.6 MMHG
BH CV ECHO MEAS - RV MEAN PG: 1 MMHG
BH CV ECHO MEAS - RV V1 MAX: 63.5 CM/SEC
BH CV ECHO MEAS - RV V1 MEAN: 43.4 CM/SEC
BH CV ECHO MEAS - RV V1 VTI: 13.1 CM
BH CV ECHO MEAS - SI(AO): 114.1 ML/M^2
BH CV ECHO MEAS - SI(CUBED): 32.7 ML/M^2
BH CV ECHO MEAS - SI(LVOT): 35.6 ML/M^2
BH CV ECHO MEAS - SI(MOD-SP2): 27.5 ML/M^2
BH CV ECHO MEAS - SI(MOD-SP4): 31.4 ML/M^2
BH CV ECHO MEAS - SI(TEICH): 27.4 ML/M^2
BH CV ECHO MEAS - SV(AO): 236.1 ML
BH CV ECHO MEAS - SV(CUBED): 67.7 ML
BH CV ECHO MEAS - SV(LVOT): 73.7 ML
BH CV ECHO MEAS - SV(MOD-SP2): 57 ML
BH CV ECHO MEAS - SV(MOD-SP4): 65 ML
BH CV ECHO MEAS - SV(TEICH): 56.7 ML
BH CV ECHO MEAS - TAPSE (>1.6): 1.2 CM
BH CV ECHO MEASUREMENTS AVERAGE E/E' RATIO: 8.84
BH CV XLRA - RV BASE: 3.6 CM
BH CV XLRA - TDI S': 7 CM/SEC
CRP SERPL-MCNC: 0.12 MG/DL (ref 0–0.5)
D DIMER PPP FEU-MCNC: 0.76 MCGFEU/ML (ref 0–0.49)
HOLD SPECIMEN: NORMAL
HOLD SPECIMEN: NORMAL
LEFT ATRIUM VOLUME INDEX: 19 ML/M2
TROPONIN T SERPL-MCNC: <0.01 NG/ML (ref 0–0.03)
WHOLE BLOOD HOLD SPECIMEN: NORMAL
WHOLE BLOOD HOLD SPECIMEN: NORMAL

## 2019-08-28 PROCEDURE — 0 IOPAMIDOL PER 1 ML: Performed by: EMERGENCY MEDICINE

## 2019-08-28 PROCEDURE — 78452 HT MUSCLE IMAGE SPECT MULT: CPT

## 2019-08-28 PROCEDURE — G0378 HOSPITAL OBSERVATION PER HR: HCPCS

## 2019-08-28 PROCEDURE — 78452 HT MUSCLE IMAGE SPECT MULT: CPT | Performed by: INTERNAL MEDICINE

## 2019-08-28 PROCEDURE — 93306 TTE W/DOPPLER COMPLETE: CPT | Performed by: INTERNAL MEDICINE

## 2019-08-28 PROCEDURE — 0 TECHNETIUM SESTAMIBI: Performed by: INTERNAL MEDICINE

## 2019-08-28 PROCEDURE — 84484 ASSAY OF TROPONIN QUANT: CPT | Performed by: EMERGENCY MEDICINE

## 2019-08-28 PROCEDURE — 99219 PR INITIAL OBSERVATION CARE/DAY 50 MINUTES: CPT | Performed by: INTERNAL MEDICINE

## 2019-08-28 PROCEDURE — 93018 CV STRESS TEST I&R ONLY: CPT | Performed by: INTERNAL MEDICINE

## 2019-08-28 PROCEDURE — 93016 CV STRESS TEST SUPVJ ONLY: CPT | Performed by: INTERNAL MEDICINE

## 2019-08-28 PROCEDURE — 71275 CT ANGIOGRAPHY CHEST: CPT

## 2019-08-28 PROCEDURE — 86140 C-REACTIVE PROTEIN: CPT | Performed by: NURSE PRACTITIONER

## 2019-08-28 PROCEDURE — 93017 CV STRESS TEST TRACING ONLY: CPT

## 2019-08-28 PROCEDURE — A9500 TC99M SESTAMIBI: HCPCS | Performed by: INTERNAL MEDICINE

## 2019-08-28 PROCEDURE — 93306 TTE W/DOPPLER COMPLETE: CPT

## 2019-08-28 RX ORDER — ATORVASTATIN CALCIUM 20 MG/1
20 TABLET, FILM COATED ORAL DAILY
Status: DISCONTINUED | OUTPATIENT
Start: 2019-08-28 | End: 2019-08-30 | Stop reason: HOSPADM

## 2019-08-28 RX ORDER — SACCHAROMYCES BOULARDII 250 MG
250 CAPSULE ORAL EVERY MORNING
Status: DISCONTINUED | OUTPATIENT
Start: 2019-08-28 | End: 2019-08-30 | Stop reason: HOSPADM

## 2019-08-28 RX ORDER — ASPIRIN 81 MG/1
81 TABLET ORAL EVERY MORNING
Status: DISCONTINUED | OUTPATIENT
Start: 2019-08-28 | End: 2019-08-30 | Stop reason: HOSPADM

## 2019-08-28 RX ORDER — ASCORBIC ACID 500 MG
250 TABLET ORAL EVERY MORNING
Status: DISCONTINUED | OUTPATIENT
Start: 2019-08-28 | End: 2019-08-30 | Stop reason: HOSPADM

## 2019-08-28 RX ORDER — SODIUM CHLORIDE 0.9 % (FLUSH) 0.9 %
10 SYRINGE (ML) INJECTION EVERY 12 HOURS SCHEDULED
Status: DISCONTINUED | OUTPATIENT
Start: 2019-08-28 | End: 2019-08-30 | Stop reason: HOSPADM

## 2019-08-28 RX ORDER — LANOLIN ALCOHOL/MO/W.PET/CERES
400 CREAM (GRAM) TOPICAL DAILY
Status: DISCONTINUED | OUTPATIENT
Start: 2019-08-28 | End: 2019-08-30 | Stop reason: HOSPADM

## 2019-08-28 RX ORDER — SODIUM CHLORIDE 0.9 % (FLUSH) 0.9 %
10 SYRINGE (ML) INJECTION AS NEEDED
Status: DISCONTINUED | OUTPATIENT
Start: 2019-08-28 | End: 2019-08-30 | Stop reason: HOSPADM

## 2019-08-28 RX ORDER — NITROGLYCERIN 0.4 MG/1
0.4 TABLET SUBLINGUAL
Status: DISCONTINUED | OUTPATIENT
Start: 2019-08-28 | End: 2019-08-30 | Stop reason: HOSPADM

## 2019-08-28 RX ADMIN — TECHNETIUM TC 99M SESTAMIBI 1 DOSE: 1 INJECTION INTRAVENOUS at 12:10

## 2019-08-28 RX ADMIN — METOPROLOL TARTRATE 25 MG: 25 TABLET ORAL at 13:41

## 2019-08-28 RX ADMIN — Medication 400 MCG: at 13:41

## 2019-08-28 RX ADMIN — METOPROLOL TARTRATE 25 MG: 25 TABLET ORAL at 22:18

## 2019-08-28 RX ADMIN — IOPAMIDOL 95 ML: 755 INJECTION, SOLUTION INTRAVENOUS at 00:52

## 2019-08-28 RX ADMIN — ATORVASTATIN CALCIUM 20 MG: 20 TABLET, FILM COATED ORAL at 22:18

## 2019-08-28 RX ADMIN — ASPIRIN 81 MG: 81 TABLET, COATED ORAL at 13:42

## 2019-08-28 RX ADMIN — SODIUM CHLORIDE, PRESERVATIVE FREE 10 ML: 5 INJECTION INTRAVENOUS at 22:19

## 2019-08-28 RX ADMIN — TECHNETIUM TC 99M SESTAMIBI 1 DOSE: 1 INJECTION INTRAVENOUS at 10:30

## 2019-08-29 LAB
ANION GAP SERPL CALCULATED.3IONS-SCNC: 14.2 MMOL/L (ref 5–15)
BASOPHILS # BLD AUTO: 0.03 10*3/MM3 (ref 0–0.2)
BASOPHILS NFR BLD AUTO: 0.5 % (ref 0–1.5)
BH CV STRESS BP STAGE 1: NORMAL
BH CV STRESS BP STAGE 2: NORMAL
BH CV STRESS BP STAGE 3: NORMAL
BH CV STRESS BP STAGE 4: NORMAL
BH CV STRESS DURATION MIN STAGE 1: 3
BH CV STRESS DURATION MIN STAGE 2: 3
BH CV STRESS DURATION MIN STAGE 3: 3
BH CV STRESS DURATION MIN STAGE 4: 1
BH CV STRESS DURATION SEC STAGE 1: 0
BH CV STRESS DURATION SEC STAGE 2: 0
BH CV STRESS DURATION SEC STAGE 3: 0
BH CV STRESS DURATION SEC STAGE 4: 40
BH CV STRESS GRADE STAGE 1: 10
BH CV STRESS GRADE STAGE 2: 12
BH CV STRESS GRADE STAGE 3: 14
BH CV STRESS GRADE STAGE 4: 16
BH CV STRESS HR STAGE 1: 92
BH CV STRESS HR STAGE 2: 108
BH CV STRESS HR STAGE 3: 125
BH CV STRESS HR STAGE 4: 135
BH CV STRESS METS STAGE 1: 5
BH CV STRESS METS STAGE 2: 7.5
BH CV STRESS METS STAGE 3: 10
BH CV STRESS METS STAGE 4: 13.5
BH CV STRESS PROTOCOL 1: NORMAL
BH CV STRESS RECOVERY BP: NORMAL MMHG
BH CV STRESS RECOVERY HR: 84 BPM
BH CV STRESS SPEED STAGE 1: 1.7
BH CV STRESS SPEED STAGE 2: 2.5
BH CV STRESS SPEED STAGE 3: 3.4
BH CV STRESS SPEED STAGE 4: 4.2
BH CV STRESS STAGE 1: 1
BH CV STRESS STAGE 2: 2
BH CV STRESS STAGE 3: 3
BH CV STRESS STAGE 4: 4
BUN BLD-MCNC: 12 MG/DL (ref 8–23)
BUN/CREAT SERPL: 14 (ref 7–25)
CALCIUM SPEC-SCNC: 9.1 MG/DL (ref 8.6–10.5)
CHLORIDE SERPL-SCNC: 108 MMOL/L (ref 98–107)
CHOLEST SERPL-MCNC: 146 MG/DL (ref 0–200)
CO2 SERPL-SCNC: 22.8 MMOL/L (ref 22–29)
CREAT BLD-MCNC: 0.86 MG/DL (ref 0.76–1.27)
DEPRECATED RDW RBC AUTO: 49.2 FL (ref 37–54)
EOSINOPHIL # BLD AUTO: 0.15 10*3/MM3 (ref 0–0.4)
EOSINOPHIL NFR BLD AUTO: 2.4 % (ref 0.3–6.2)
ERYTHROCYTE [DISTWIDTH] IN BLOOD BY AUTOMATED COUNT: 13.2 % (ref 12.3–15.4)
GFR SERPL CREATININE-BSD FRML MDRD: 90 ML/MIN/1.73
GLUCOSE BLD-MCNC: 90 MG/DL (ref 65–99)
HBA1C MFR BLD: 5.55 % (ref 4.8–5.6)
HCT VFR BLD AUTO: 46.5 % (ref 37.5–51)
HDLC SERPL-MCNC: 52 MG/DL (ref 40–60)
HGB BLD-MCNC: 14.2 G/DL (ref 13–17.7)
IMM GRANULOCYTES # BLD AUTO: 0.04 10*3/MM3 (ref 0–0.05)
IMM GRANULOCYTES NFR BLD AUTO: 0.6 % (ref 0–0.5)
LDLC SERPL CALC-MCNC: 58 MG/DL (ref 0–100)
LDLC/HDLC SERPL: 1.11 {RATIO}
LV EF NUC BP: 60 %
LYMPHOCYTES # BLD AUTO: 1.63 10*3/MM3 (ref 0.7–3.1)
LYMPHOCYTES NFR BLD AUTO: 26.1 % (ref 19.6–45.3)
MAGNESIUM SERPL-MCNC: 2.5 MG/DL (ref 1.6–2.4)
MAXIMAL PREDICTED HEART RATE: 159 BPM
MCH RBC QN AUTO: 30.4 PG (ref 26.6–33)
MCHC RBC AUTO-ENTMCNC: 30.5 G/DL (ref 31.5–35.7)
MCV RBC AUTO: 99.6 FL (ref 79–97)
MONOCYTES # BLD AUTO: 0.82 10*3/MM3 (ref 0.1–0.9)
MONOCYTES NFR BLD AUTO: 13.1 % (ref 5–12)
NEUTROPHILS # BLD AUTO: 3.58 10*3/MM3 (ref 1.7–7)
NEUTROPHILS NFR BLD AUTO: 57.3 % (ref 42.7–76)
NRBC BLD AUTO-RTO: 0 /100 WBC (ref 0–0.2)
NT-PROBNP SERPL-MCNC: 267.7 PG/ML (ref 5–900)
PERCENT MAX PREDICTED HR: 85.53 %
PLATELET # BLD AUTO: 198 10*3/MM3 (ref 140–450)
PMV BLD AUTO: 10.3 FL (ref 6–12)
POTASSIUM BLD-SCNC: 4.2 MMOL/L (ref 3.5–5.2)
RBC # BLD AUTO: 4.67 10*6/MM3 (ref 4.14–5.8)
SODIUM BLD-SCNC: 145 MMOL/L (ref 136–145)
STRESS BASELINE BP: NORMAL MMHG
STRESS BASELINE HR: 66 BPM
STRESS PERCENT HR: 101 %
STRESS POST ESTIMATED WORKLOAD: 11.2 METS
STRESS POST EXERCISE DUR MIN: 10 MIN
STRESS POST EXERCISE DUR SEC: 40 SEC
STRESS POST PEAK BP: NORMAL MMHG
STRESS POST PEAK HR: 136 BPM
STRESS TARGET HR: 135 BPM
TRIGL SERPL-MCNC: 181 MG/DL (ref 0–150)
VLDLC SERPL-MCNC: 36.2 MG/DL (ref 5–40)
WBC NRBC COR # BLD: 6.25 10*3/MM3 (ref 3.4–10.8)

## 2019-08-29 PROCEDURE — 80061 LIPID PANEL: CPT | Performed by: NURSE PRACTITIONER

## 2019-08-29 PROCEDURE — 80048 BASIC METABOLIC PNL TOTAL CA: CPT | Performed by: NURSE PRACTITIONER

## 2019-08-29 PROCEDURE — C1894 INTRO/SHEATH, NON-LASER: HCPCS | Performed by: INTERNAL MEDICINE

## 2019-08-29 PROCEDURE — 25010000002 FENTANYL CITRATE (PF) 100 MCG/2ML SOLUTION: Performed by: INTERNAL MEDICINE

## 2019-08-29 PROCEDURE — C1769 GUIDE WIRE: HCPCS | Performed by: INTERNAL MEDICINE

## 2019-08-29 PROCEDURE — 83880 ASSAY OF NATRIURETIC PEPTIDE: CPT | Performed by: NURSE PRACTITIONER

## 2019-08-29 PROCEDURE — G0378 HOSPITAL OBSERVATION PER HR: HCPCS

## 2019-08-29 PROCEDURE — 25010000002 HEPARIN (PORCINE) PER 1000 UNITS: Performed by: INTERNAL MEDICINE

## 2019-08-29 PROCEDURE — 93459 L HRT ART/GRFT ANGIO: CPT | Performed by: INTERNAL MEDICINE

## 2019-08-29 PROCEDURE — 85025 COMPLETE CBC W/AUTO DIFF WBC: CPT | Performed by: NURSE PRACTITIONER

## 2019-08-29 PROCEDURE — 0 IOPAMIDOL PER 1 ML: Performed by: INTERNAL MEDICINE

## 2019-08-29 PROCEDURE — 83735 ASSAY OF MAGNESIUM: CPT | Performed by: NURSE PRACTITIONER

## 2019-08-29 PROCEDURE — 25010000002 MIDAZOLAM PER 1 MG: Performed by: INTERNAL MEDICINE

## 2019-08-29 PROCEDURE — 83036 HEMOGLOBIN GLYCOSYLATED A1C: CPT | Performed by: NURSE PRACTITIONER

## 2019-08-29 RX ORDER — FENTANYL CITRATE 50 UG/ML
INJECTION, SOLUTION INTRAMUSCULAR; INTRAVENOUS AS NEEDED
Status: DISCONTINUED | OUTPATIENT
Start: 2019-08-29 | End: 2019-08-29 | Stop reason: HOSPADM

## 2019-08-29 RX ORDER — MIDAZOLAM HYDROCHLORIDE 1 MG/ML
INJECTION INTRAMUSCULAR; INTRAVENOUS AS NEEDED
Status: DISCONTINUED | OUTPATIENT
Start: 2019-08-29 | End: 2019-08-29 | Stop reason: HOSPADM

## 2019-08-29 RX ORDER — LIDOCAINE HYDROCHLORIDE 20 MG/ML
INJECTION, SOLUTION INFILTRATION; PERINEURAL AS NEEDED
Status: DISCONTINUED | OUTPATIENT
Start: 2019-08-29 | End: 2019-08-29 | Stop reason: HOSPADM

## 2019-08-29 RX ORDER — SODIUM CHLORIDE 9 MG/ML
INJECTION, SOLUTION INTRAVENOUS CONTINUOUS PRN
Status: COMPLETED | OUTPATIENT
Start: 2019-08-29 | End: 2019-08-29

## 2019-08-29 RX ORDER — SODIUM CHLORIDE 9 MG/ML
100 INJECTION, SOLUTION INTRAVENOUS CONTINUOUS
Status: DISCONTINUED | OUTPATIENT
Start: 2019-08-29 | End: 2019-08-30 | Stop reason: HOSPADM

## 2019-08-29 RX ADMIN — SODIUM CHLORIDE, PRESERVATIVE FREE 10 ML: 5 INJECTION INTRAVENOUS at 21:03

## 2019-08-29 RX ADMIN — METOPROLOL TARTRATE 25 MG: 25 TABLET ORAL at 08:07

## 2019-08-29 RX ADMIN — METOPROLOL TARTRATE 25 MG: 25 TABLET ORAL at 21:03

## 2019-08-29 RX ADMIN — Medication 400 MCG: at 08:07

## 2019-08-29 RX ADMIN — ATORVASTATIN CALCIUM 20 MG: 20 TABLET, FILM COATED ORAL at 21:02

## 2019-08-29 RX ADMIN — ASPIRIN 81 MG: 81 TABLET, COATED ORAL at 08:07

## 2019-08-29 RX ADMIN — SODIUM CHLORIDE, PRESERVATIVE FREE 10 ML: 5 INJECTION INTRAVENOUS at 08:08

## 2019-08-30 VITALS
TEMPERATURE: 98 F | DIASTOLIC BLOOD PRESSURE: 83 MMHG | SYSTOLIC BLOOD PRESSURE: 139 MMHG | HEIGHT: 69 IN | HEART RATE: 68 BPM | OXYGEN SATURATION: 98 % | WEIGHT: 201.6 LBS | BODY MASS INDEX: 29.86 KG/M2 | RESPIRATION RATE: 18 BRPM

## 2019-08-30 LAB
ANION GAP SERPL CALCULATED.3IONS-SCNC: 13 MMOL/L (ref 5–15)
BUN BLD-MCNC: 9 MG/DL (ref 8–23)
BUN/CREAT SERPL: 10.1 (ref 7–25)
CALCIUM SPEC-SCNC: 9 MG/DL (ref 8.6–10.5)
CHLORIDE SERPL-SCNC: 102 MMOL/L (ref 98–107)
CO2 SERPL-SCNC: 22 MMOL/L (ref 22–29)
CREAT BLD-MCNC: 0.89 MG/DL (ref 0.76–1.27)
GFR SERPL CREATININE-BSD FRML MDRD: 87 ML/MIN/1.73
GLUCOSE BLD-MCNC: 94 MG/DL (ref 65–99)
POTASSIUM BLD-SCNC: 4.2 MMOL/L (ref 3.5–5.2)
SODIUM BLD-SCNC: 137 MMOL/L (ref 136–145)

## 2019-08-30 PROCEDURE — 80048 BASIC METABOLIC PNL TOTAL CA: CPT | Performed by: INTERNAL MEDICINE

## 2019-08-30 PROCEDURE — G0378 HOSPITAL OBSERVATION PER HR: HCPCS

## 2019-08-30 RX ADMIN — METOPROLOL TARTRATE 25 MG: 25 TABLET ORAL at 12:06

## 2019-09-15 ENCOUNTER — CLINICAL SUPPORT (OUTPATIENT)
Dept: CARDIAC SURGERY | Facility: CLINIC | Age: 64
End: 2019-09-15

## 2019-09-15 DIAGNOSIS — I10 ESSENTIAL (PRIMARY) HYPERTENSION: ICD-10-CM

## 2019-09-15 DIAGNOSIS — Z48.812 ENCOUNTER FOR SURGICAL AFTERCARE FOLLOWING SURGERY OF CIRCULATORY SYSTEM: Primary | ICD-10-CM

## 2019-09-15 DIAGNOSIS — I25.10 ATHSCL HEART DISEASE OF NATIVE CORONARY ARTERY W/O ANG PCTRS: ICD-10-CM

## 2019-09-15 DIAGNOSIS — Z95.1 PRESENCE OF AORTOCORONARY BYPASS GRAFT: ICD-10-CM

## 2019-09-15 PROCEDURE — G0180 MD CERTIFICATION HHA PATIENT: HCPCS | Performed by: THORACIC SURGERY (CARDIOTHORACIC VASCULAR SURGERY)

## 2019-09-23 ENCOUNTER — TREATMENT (OUTPATIENT)
Dept: CARDIAC REHAB | Facility: HOSPITAL | Age: 64
End: 2019-09-23

## 2019-09-23 DIAGNOSIS — Z95.1 STATUS POST FOUR VESSEL CORONARY ARTERY BYPASS: Primary | ICD-10-CM

## 2019-09-23 PROCEDURE — 93798 PHYS/QHP OP CAR RHAB W/ECG: CPT

## 2019-09-25 ENCOUNTER — TREATMENT (OUTPATIENT)
Dept: CARDIAC REHAB | Facility: HOSPITAL | Age: 64
End: 2019-09-25

## 2019-09-25 DIAGNOSIS — Z95.1 STATUS POST FOUR VESSEL CORONARY ARTERY BYPASS: Primary | ICD-10-CM

## 2019-09-25 PROCEDURE — 93798 PHYS/QHP OP CAR RHAB W/ECG: CPT

## 2019-09-27 ENCOUNTER — TREATMENT (OUTPATIENT)
Dept: CARDIAC REHAB | Facility: HOSPITAL | Age: 64
End: 2019-09-27

## 2019-09-27 DIAGNOSIS — Z95.1 STATUS POST FOUR VESSEL CORONARY ARTERY BYPASS: Primary | ICD-10-CM

## 2019-09-27 PROCEDURE — 93798 PHYS/QHP OP CAR RHAB W/ECG: CPT

## 2019-10-01 ENCOUNTER — CLINICAL SUPPORT (OUTPATIENT)
Dept: CARDIAC SURGERY | Facility: CLINIC | Age: 64
End: 2019-10-01

## 2019-10-01 DIAGNOSIS — I10 ESSENTIAL (PRIMARY) HYPERTENSION: ICD-10-CM

## 2019-10-01 DIAGNOSIS — Z95.1 PRESENCE OF AORTOCORONARY BYPASS GRAFT: ICD-10-CM

## 2019-10-01 DIAGNOSIS — I25.10 ATHSCL HEART DISEASE OF NATIVE CORONARY ARTERY W/O ANG PCTRS: ICD-10-CM

## 2019-10-01 DIAGNOSIS — Z48.812 ENCNTR FOR SURGICAL AFTCR FOLLOWING SURGERY ON THE CIRC SYS: Primary | ICD-10-CM

## 2019-10-01 PROCEDURE — G0180 MD CERTIFICATION HHA PATIENT: HCPCS | Performed by: THORACIC SURGERY (CARDIOTHORACIC VASCULAR SURGERY)

## 2019-10-01 NOTE — PROGRESS NOTES
Subjective:        Elijah Montero is a 63 y.o. male who here for follow up    No chief complaint on file.  He is here today for follow-up visit post cardiac cath.    HPI  Elijah Montero is a 63-year-old male, who is at his provider.  He has a history of CAD, hyperlipidemia, positive PPD, prediabetes, obesity, and hypertension.  He was admitted at [facility] complaining of substernal chest pain.  He underwent a CABG x4 6 weeks prior to this event.  Repeat stress test which was abnormal he underwent catheterization at that time on August 29 and revealed 1 of his 4 grafts have been occluded, and the other 3 were patent with the occluded graft and SVG to RCA but collaterals were noted at that time. Recommendations at that time were to be treated medically.  Discharge right radial site no edema, drainage, arrhythmia, ecchymosis or pain.  Echo on 8/2019 EF 54%, calcification of the aortic valve, the pericardium is normal and no evidence of pericardial effusion.      Denies chest pain, shortness of breath, palpitations, syncope or near syncope.      The following portions of the patient's history were reviewed and updated as appropriate: allergies, current medications, past family history, past medical history, past social history, past surgical history and problem list.    Past Medical History:   Diagnosis Date   • Chest pain     possibly due to pericarditis   • Coronary artery disease     mild to moderate   • Heart disease    • History of positive PPD     as a child    • History of rotator cuff tear     LEFT 1988   • Hyperlipidemia    • Hypertension          reports that he quit smoking about 35 years ago. His smoking use included cigarettes. He started smoking about 49 years ago. He has a 14.00 pack-year smoking history. He has never used smokeless tobacco. He reports that he drinks alcohol. He reports that he does not use drugs.     Family History   Problem Relation Age of Onset   • Other Mother 65        cabg   • Heart  disease Father    • Other Father 45        cabg   • Other Sister         BLOOD DISEASE    • Malig Hyperthermia Neg Hx        ROS     Review of Systems  Constitutional: No wt loss, fever, fatigue  Gastrointestinal: No nausea, abdominal pain  Behavioral/Psych: No insomnia or anxiety  Cardiovascular: Chest pain, shortness of breath, palpitations    Objective:           Physical Exam   Constitutional: He is oriented to person, place, and time. He appears well-developed and well-nourished.   HENT:   Head: Normocephalic.   Right Ear: External ear normal.   Left Ear: External ear normal.   Eyes: EOM are normal.   Neck: Normal range of motion. No JVD present.   Cardiovascular: Normal rate, regular rhythm, normal heart sounds and intact distal pulses. Exam reveals no gallop and no friction rub.   No murmur heard.  Pulmonary/Chest: Effort normal and breath sounds normal. No stridor. No respiratory distress. He has no rales.   Abdominal: Soft. Bowel sounds are normal. He exhibits no distension. There is no tenderness. There is no guarding.   Musculoskeletal: Normal range of motion. He exhibits no edema or tenderness.   Neurological: He is alert and oriented to person, place, and time. He has normal reflexes.   Skin: Skin is warm.   r and l radial site no s/s of infection or hematoma noted.   Psychiatric: He has a normal mood and affect. Judgment normal.   Nursing note and vitals reviewed.      Procedures     Conclusion        · Successful right and left coronary angiogram, LV gram, saphenous vein angiogram, internal mammary artery angiogram  · Normal left main  · Left anterior descending proximal 70% narrowed with saphenous vein graft to the diagonal had ostial 70% stenosis, and internal mammary artery to the LAD was patent with normal distal flow  · Circumflex artery was a codominant with ostial 70% stenosis, saphenous vein graft to the OM patent at the site of insertion 80 to 90% stenosis, OM is a small caliber vessel  distally  · Right coronary artery codominant proximal 50% stenosis distal before the bifurcation 90% stenosis with a saphenous vein graft to the RCA is closed  · LV gram showed a mild inferior wall hypokinetic estimate ejection fraction 60%     August 29, 2019     Elijah Montero  1955  63 y.o.  male  [id number]   Interpretation Summary     · There is calcification of the aortic valve.  · Calculated EF = 54.0%  · The pericardium is normal. There is no evidence of pericardial effusion.              Current Outpatient Medications:   •  aspirin 81 MG EC tablet, Take 81 mg by mouth Every Morning., Disp: , Rfl:   •  B Complex Vitamins (VITAMIN B COMPLEX PO), Take 1 tablet by mouth Every Morning., Disp: , Rfl:   •  folic acid (FOLVITE) 400 MCG tablet, Take 400 mcg by mouth Daily., Disp: , Rfl:   •  metoprolol tartrate (LOPRESSOR) 25 MG tablet, Take 1 tablet by mouth Every 12 (Twelve) Hours., Disp: 60 tablet, Rfl: 2  •  nitroglycerin (NITROSTAT) 0.4 MG SL tablet, 1 under the tongue as needed for angina, may repeat q5mins for up three doses (Patient taking differently: Place 0.4 mg under the tongue Every 5 (Five) Minutes As Needed for Chest Pain. 1 under the tongue as needed for angina, may repeat q5mins for up three doses), Disp: 100 tablet, Rfl: 11  •  saccharomyces boulardii (FLORASTOR) 250 MG capsule, Take 250 mg by mouth Every Morning., Disp: , Rfl:   •  simvastatin (ZOCOR) 40 MG tablet, Take 40 mg by mouth Every Night., Disp: , Rfl:   •  vitamin C (ASCORBIC ACID) 250 MG tablet, Take 250 mg by mouth Every Morning., Disp: , Rfl:   No current facility-administered medications for this visit.     Facility-Administered Medications Ordered in Other Visits:   •  Chlorhexidine Gluconate Cloth 2 % pads 1 application, 1 application, Topical, Q12H PRN, Rajwinder Carl APRN     Assessment:        Patient Active Problem List   Diagnosis   • Heart disease   • Hyperlipidemia   • Essential hypertension   • History of positive  PPD   • Prediabetes   • Obesity (BMI 30-39.9)   • Coronary artery disease of native artery of native heart with stable angina pectoris (CMS/HCC)   • Precordial pain   • Abnormal cardiac function test   • Coronary artery disease involving native coronary artery   • Shortness of breath   • Precordial chest pain               Plan:    1. CAD with S/p cardiac cath: no s/s of infection or hematoma noted. Recommendations post cardiac cath was to treat medically.  History of CABG. Currently in cardiac rehab. Denies chest pain, shob, and palpitations. He states he taking his medications without any complaints. He will follow up with Dr. Perry in 3 months and will discuss doing functional stress test at that time. Continue asa, BB, and statin.    Risk reduction for the coronary artery disease, controlling the blood pressure, blood sugar management, cholesterol management, exercise, stress management, and proper compliance with medications and follow-up has been discussed    2. Hypertension: Today in the office his blood is sugar is 147/87, heart rate 64.  He states he is much better at home.  He will continue current medications.    Educated patient on exercising for at least 30 minutes a day for 2 to 3 days a week. Importance of controlling hypertension and blood pressure checkup on the regular basis has been explained. Hypertension as a silent killer has been discussed. Risk reduction of the weight and regular exercises to control the hypertension has been explained.    3. Hyperlipidemia: Last lipid profile on 8/29/19 showed , HDL 52, LDL 58, and trig. 181. Continue statin.    Risk of the hyperlipidemia, importance of the treatment has been explained. Pros and cons of the statins has been explained. Regular blood workup as well as side effects including the liver failure, myelopathy death has been explained.       No diagnosis found.    There are no diagnoses linked to this encounter.    COUNSELING:    Elijah TEMPLE  Abdirahman was given to patient for the following topics: diagnostic results, risk factor reductions, impressions, risks and benefits of treatment options and importance of treatment compliance .       SMOKING COUNSELING: Denies     Follow-up with Dr. Perry in 3 months and decide on functional stress test at that time.    Sincerely,   MYLES Hare  [facility]  10/01/19  4:52 PM

## 2019-10-02 ENCOUNTER — OFFICE VISIT (OUTPATIENT)
Dept: CARDIOLOGY | Facility: CLINIC | Age: 64
End: 2019-10-02

## 2019-10-02 ENCOUNTER — TELEPHONE (OUTPATIENT)
Dept: CARDIOLOGY | Facility: CLINIC | Age: 64
End: 2019-10-02

## 2019-10-02 ENCOUNTER — TREATMENT (OUTPATIENT)
Dept: CARDIAC REHAB | Facility: HOSPITAL | Age: 64
End: 2019-10-02

## 2019-10-02 VITALS
WEIGHT: 204 LBS | HEART RATE: 64 BPM | SYSTOLIC BLOOD PRESSURE: 147 MMHG | DIASTOLIC BLOOD PRESSURE: 87 MMHG | HEIGHT: 69 IN | BODY MASS INDEX: 30.21 KG/M2

## 2019-10-02 DIAGNOSIS — Z09 HOSPITAL DISCHARGE FOLLOW-UP: ICD-10-CM

## 2019-10-02 DIAGNOSIS — Z95.1 STATUS POST FOUR VESSEL CORONARY ARTERY BYPASS: Primary | ICD-10-CM

## 2019-10-02 DIAGNOSIS — E78.5 HYPERLIPIDEMIA, UNSPECIFIED HYPERLIPIDEMIA TYPE: Primary | ICD-10-CM

## 2019-10-02 DIAGNOSIS — I25.118 CORONARY ARTERY DISEASE OF NATIVE ARTERY OF NATIVE HEART WITH STABLE ANGINA PECTORIS (HCC): ICD-10-CM

## 2019-10-02 DIAGNOSIS — I10 ESSENTIAL HYPERTENSION: ICD-10-CM

## 2019-10-02 PROCEDURE — 99213 OFFICE O/P EST LOW 20 MIN: CPT | Performed by: NURSE PRACTITIONER

## 2019-10-02 PROCEDURE — 93798 PHYS/QHP OP CAR RHAB W/ECG: CPT

## 2019-10-02 RX ORDER — SIMVASTATIN 40 MG
40 TABLET ORAL NIGHTLY
Qty: 90 TABLET | Refills: 4 | Status: SHIPPED | OUTPATIENT
Start: 2019-10-02 | End: 2020-10-16 | Stop reason: SDUPTHER

## 2019-10-02 NOTE — TELEPHONE ENCOUNTER
----- Message from Augusta Rajan sent at 10/1/2019  3:40 PM EDT -----  Regarding: RE APPT IN THE AM 10/2  Contact: [phone]  DOES NOT KNOW WHY HE NEEDS APPT TOMORROW AND TMT ON Thursday   CAN SOMEONE VERIFY THE REASON FOR THIS?  PT IS ALREADY IN REHAB

## 2019-10-03 ENCOUNTER — APPOINTMENT (OUTPATIENT)
Dept: CARDIOLOGY | Facility: HOSPITAL | Age: 64
End: 2019-10-03

## 2019-10-04 ENCOUNTER — TREATMENT (OUTPATIENT)
Dept: CARDIAC REHAB | Facility: HOSPITAL | Age: 64
End: 2019-10-04

## 2019-10-04 DIAGNOSIS — Z95.1 STATUS POST FOUR VESSEL CORONARY ARTERY BYPASS: Primary | ICD-10-CM

## 2019-10-04 PROCEDURE — 93798 PHYS/QHP OP CAR RHAB W/ECG: CPT

## 2019-10-09 ENCOUNTER — TREATMENT (OUTPATIENT)
Dept: CARDIAC REHAB | Facility: HOSPITAL | Age: 64
End: 2019-10-09

## 2019-10-09 DIAGNOSIS — Z95.1 STATUS POST FOUR VESSEL CORONARY ARTERY BYPASS: Primary | ICD-10-CM

## 2019-10-09 PROCEDURE — 93798 PHYS/QHP OP CAR RHAB W/ECG: CPT

## 2019-10-14 ENCOUNTER — TREATMENT (OUTPATIENT)
Dept: CARDIAC REHAB | Facility: HOSPITAL | Age: 64
End: 2019-10-14

## 2019-10-14 DIAGNOSIS — Z95.1 STATUS POST FOUR VESSEL CORONARY ARTERY BYPASS: Primary | ICD-10-CM

## 2019-10-14 PROCEDURE — 93798 PHYS/QHP OP CAR RHAB W/ECG: CPT

## 2019-10-16 ENCOUNTER — TREATMENT (OUTPATIENT)
Dept: CARDIAC REHAB | Facility: HOSPITAL | Age: 64
End: 2019-10-16

## 2019-10-16 DIAGNOSIS — Z95.1 STATUS POST FOUR VESSEL CORONARY ARTERY BYPASS: Primary | ICD-10-CM

## 2019-10-16 PROCEDURE — 93798 PHYS/QHP OP CAR RHAB W/ECG: CPT

## 2019-10-18 ENCOUNTER — TREATMENT (OUTPATIENT)
Dept: CARDIAC REHAB | Facility: HOSPITAL | Age: 64
End: 2019-10-18

## 2019-10-18 DIAGNOSIS — Z95.1 STATUS POST FOUR VESSEL CORONARY ARTERY BYPASS: Primary | ICD-10-CM

## 2019-10-18 PROCEDURE — 93798 PHYS/QHP OP CAR RHAB W/ECG: CPT

## 2019-10-23 ENCOUNTER — TREATMENT (OUTPATIENT)
Dept: CARDIAC REHAB | Facility: HOSPITAL | Age: 64
End: 2019-10-23

## 2019-10-23 DIAGNOSIS — Z95.1 STATUS POST FOUR VESSEL CORONARY ARTERY BYPASS: Primary | ICD-10-CM

## 2019-10-23 PROCEDURE — 93798 PHYS/QHP OP CAR RHAB W/ECG: CPT

## 2019-10-25 ENCOUNTER — TREATMENT (OUTPATIENT)
Dept: CARDIAC REHAB | Facility: HOSPITAL | Age: 64
End: 2019-10-25

## 2019-10-25 DIAGNOSIS — Z95.1 STATUS POST FOUR VESSEL CORONARY ARTERY BYPASS: Primary | ICD-10-CM

## 2019-10-25 PROCEDURE — 93798 PHYS/QHP OP CAR RHAB W/ECG: CPT

## 2019-10-28 ENCOUNTER — TREATMENT (OUTPATIENT)
Dept: CARDIAC REHAB | Facility: HOSPITAL | Age: 64
End: 2019-10-28

## 2019-10-28 DIAGNOSIS — Z95.1 STATUS POST FOUR VESSEL CORONARY ARTERY BYPASS: Primary | ICD-10-CM

## 2019-10-28 PROCEDURE — 93798 PHYS/QHP OP CAR RHAB W/ECG: CPT

## 2019-10-30 ENCOUNTER — TREATMENT (OUTPATIENT)
Dept: CARDIAC REHAB | Facility: HOSPITAL | Age: 64
End: 2019-10-30

## 2019-10-30 DIAGNOSIS — Z95.1 STATUS POST FOUR VESSEL CORONARY ARTERY BYPASS: Primary | ICD-10-CM

## 2019-10-30 PROCEDURE — 93798 PHYS/QHP OP CAR RHAB W/ECG: CPT

## 2019-11-01 ENCOUNTER — TREATMENT (OUTPATIENT)
Dept: CARDIAC REHAB | Facility: HOSPITAL | Age: 64
End: 2019-11-01

## 2019-11-01 DIAGNOSIS — Z95.1 STATUS POST FOUR VESSEL CORONARY ARTERY BYPASS: Primary | ICD-10-CM

## 2019-11-01 PROCEDURE — 93798 PHYS/QHP OP CAR RHAB W/ECG: CPT

## 2019-11-04 ENCOUNTER — APPOINTMENT (OUTPATIENT)
Dept: CARDIAC REHAB | Facility: HOSPITAL | Age: 64
End: 2019-11-04

## 2019-11-06 ENCOUNTER — APPOINTMENT (OUTPATIENT)
Dept: CARDIAC REHAB | Facility: HOSPITAL | Age: 64
End: 2019-11-06

## 2019-11-08 ENCOUNTER — APPOINTMENT (OUTPATIENT)
Dept: CARDIAC REHAB | Facility: HOSPITAL | Age: 64
End: 2019-11-08

## 2019-11-11 ENCOUNTER — APPOINTMENT (OUTPATIENT)
Dept: CARDIAC REHAB | Facility: HOSPITAL | Age: 64
End: 2019-11-11

## 2019-11-13 ENCOUNTER — APPOINTMENT (OUTPATIENT)
Dept: CARDIAC REHAB | Facility: HOSPITAL | Age: 64
End: 2019-11-13

## 2019-11-15 ENCOUNTER — APPOINTMENT (OUTPATIENT)
Dept: CARDIAC REHAB | Facility: HOSPITAL | Age: 64
End: 2019-11-15

## 2019-11-18 ENCOUNTER — APPOINTMENT (OUTPATIENT)
Dept: CARDIAC REHAB | Facility: HOSPITAL | Age: 64
End: 2019-11-18

## 2019-12-03 RX ORDER — SIMVASTATIN 40 MG
TABLET ORAL
Qty: 90 TABLET | Refills: 0 | OUTPATIENT
Start: 2019-12-03

## 2019-12-12 DIAGNOSIS — Z12.5 ENCOUNTER FOR SCREENING FOR MALIGNANT NEOPLASM OF PROSTATE: ICD-10-CM

## 2019-12-12 DIAGNOSIS — R73.01 ELEVATED FASTING GLUCOSE: ICD-10-CM

## 2019-12-12 DIAGNOSIS — Z00.00 HEALTH MAINTENANCE EXAMINATION: Primary | ICD-10-CM

## 2019-12-13 LAB
ALBUMIN SERPL-MCNC: 4.8 G/DL (ref 3.5–5.2)
ALBUMIN/GLOB SERPL: 1.8 G/DL
ALP SERPL-CCNC: 71 U/L (ref 39–117)
ALT SERPL-CCNC: 18 U/L (ref 1–41)
APPEARANCE UR: CLEAR
AST SERPL-CCNC: 21 U/L (ref 1–40)
BACTERIA #/AREA URNS HPF: NORMAL /HPF
BASOPHILS # BLD AUTO: 0.04 10*3/MM3 (ref 0–0.2)
BASOPHILS NFR BLD AUTO: 0.6 % (ref 0–1.5)
BILIRUB SERPL-MCNC: 0.4 MG/DL (ref 0.2–1.2)
BILIRUB UR QL STRIP: NEGATIVE
BUN SERPL-MCNC: 16 MG/DL (ref 8–23)
BUN/CREAT SERPL: 14.4 (ref 7–25)
CALCIUM SERPL-MCNC: 9.5 MG/DL (ref 8.6–10.5)
CASTS URNS MICRO: NORMAL
CHLORIDE SERPL-SCNC: 103 MMOL/L (ref 98–107)
CHOLEST SERPL-MCNC: 134 MG/DL (ref 0–200)
CO2 SERPL-SCNC: 26.6 MMOL/L (ref 22–29)
COLOR UR: YELLOW
CREAT SERPL-MCNC: 1.11 MG/DL (ref 0.76–1.27)
EOSINOPHIL # BLD AUTO: 0.06 10*3/MM3 (ref 0–0.4)
EOSINOPHIL NFR BLD AUTO: 0.9 % (ref 0.3–6.2)
EPI CELLS #/AREA URNS HPF: NORMAL /HPF
ERYTHROCYTE [DISTWIDTH] IN BLOOD BY AUTOMATED COUNT: 13.6 % (ref 12.3–15.4)
GLOBULIN SER CALC-MCNC: 2.7 GM/DL
GLUCOSE SERPL-MCNC: 100 MG/DL (ref 65–99)
GLUCOSE UR QL: NEGATIVE
HBA1C MFR BLD: 6.2 % (ref 4.8–5.6)
HCT VFR BLD AUTO: 46.2 % (ref 37.5–51)
HDLC SERPL-MCNC: 46 MG/DL (ref 40–60)
HGB BLD-MCNC: 15.3 G/DL (ref 13–17.7)
HGB UR QL STRIP: ABNORMAL
IMM GRANULOCYTES # BLD AUTO: 0.03 10*3/MM3 (ref 0–0.05)
IMM GRANULOCYTES NFR BLD AUTO: 0.4 % (ref 0–0.5)
KETONES UR QL STRIP: NEGATIVE
LDLC SERPL CALC-MCNC: 68 MG/DL (ref 0–100)
LDLC/HDLC SERPL: 1.48 {RATIO}
LEUKOCYTE ESTERASE UR QL STRIP: NEGATIVE
LYMPHOCYTES # BLD AUTO: 1.47 10*3/MM3 (ref 0.7–3.1)
LYMPHOCYTES NFR BLD AUTO: 21.6 % (ref 19.6–45.3)
MCH RBC QN AUTO: 31 PG (ref 26.6–33)
MCHC RBC AUTO-ENTMCNC: 33.1 G/DL (ref 31.5–35.7)
MCV RBC AUTO: 93.5 FL (ref 79–97)
MONOCYTES # BLD AUTO: 0.77 10*3/MM3 (ref 0.1–0.9)
MONOCYTES NFR BLD AUTO: 11.3 % (ref 5–12)
NEUTROPHILS # BLD AUTO: 4.42 10*3/MM3 (ref 1.7–7)
NEUTROPHILS NFR BLD AUTO: 65.2 % (ref 42.7–76)
NITRITE UR QL STRIP: NEGATIVE
NRBC BLD AUTO-RTO: 0 /100 WBC (ref 0–0.2)
PH UR STRIP: 5.5 [PH] (ref 5–8)
PLATELET # BLD AUTO: 197 10*3/MM3 (ref 140–450)
POTASSIUM SERPL-SCNC: 4.8 MMOL/L (ref 3.5–5.2)
PROT SERPL-MCNC: 7.5 G/DL (ref 6–8.5)
PROT UR QL STRIP: NEGATIVE
PSA SERPL-MCNC: 1.5 NG/ML (ref 0–4)
RBC # BLD AUTO: 4.94 10*6/MM3 (ref 4.14–5.8)
RBC #/AREA URNS HPF: NORMAL /HPF
SODIUM SERPL-SCNC: 142 MMOL/L (ref 136–145)
SP GR UR: 1.03 (ref 1–1.03)
TRIGL SERPL-MCNC: 99 MG/DL (ref 0–150)
UROBILINOGEN UR STRIP-MCNC: ABNORMAL MG/DL
VLDLC SERPL CALC-MCNC: 19.8 MG/DL
WBC # BLD AUTO: 6.79 10*3/MM3 (ref 3.4–10.8)
WBC #/AREA URNS HPF: NORMAL /HPF

## 2019-12-18 ENCOUNTER — OFFICE VISIT (OUTPATIENT)
Dept: FAMILY MEDICINE CLINIC | Facility: CLINIC | Age: 64
End: 2019-12-18

## 2019-12-18 ENCOUNTER — RESULTS ENCOUNTER (OUTPATIENT)
Dept: FAMILY MEDICINE CLINIC | Facility: CLINIC | Age: 64
End: 2019-12-18

## 2019-12-18 VITALS
DIASTOLIC BLOOD PRESSURE: 80 MMHG | RESPIRATION RATE: 16 BRPM | BODY MASS INDEX: 30.51 KG/M2 | HEART RATE: 76 BPM | OXYGEN SATURATION: 99 % | WEIGHT: 206 LBS | TEMPERATURE: 98.1 F | HEIGHT: 69 IN | SYSTOLIC BLOOD PRESSURE: 140 MMHG

## 2019-12-18 DIAGNOSIS — Z12.11 ENCOUNTER FOR SCREENING FOR MALIGNANT NEOPLASM OF COLON: ICD-10-CM

## 2019-12-18 DIAGNOSIS — I25.10 CORONARY ARTERY DISEASE INVOLVING NATIVE CORONARY ARTERY OF NATIVE HEART WITHOUT ANGINA PECTORIS: ICD-10-CM

## 2019-12-18 DIAGNOSIS — Z00.00 HEALTH MAINTENANCE EXAMINATION: ICD-10-CM

## 2019-12-18 DIAGNOSIS — R73.03 PREDIABETES: ICD-10-CM

## 2019-12-18 DIAGNOSIS — E78.5 HYPERLIPIDEMIA, UNSPECIFIED HYPERLIPIDEMIA TYPE: ICD-10-CM

## 2019-12-18 DIAGNOSIS — I10 ESSENTIAL HYPERTENSION: ICD-10-CM

## 2019-12-18 DIAGNOSIS — Z00.00 HEALTH MAINTENANCE EXAMINATION: Primary | ICD-10-CM

## 2019-12-18 PROCEDURE — 99396 PREV VISIT EST AGE 40-64: CPT | Performed by: INTERNAL MEDICINE

## 2019-12-18 NOTE — PROGRESS NOTES
Subjective   Elijah Montero is a 63 y.o. male. Patient is here today for a complete physical exam.  He is feeling well and has no acute complaints  PMH-history of four-vessel CABG in 2019, hypertension, hyperlipidemia.  The patient also had bilateral hernia repair as a child.  He denies any other operations or significant hospitalizations.  SH-the patient's , sexually active, exercising regularly and has regular dental and vision checks.  His last colonoscopy reportedly was about 8 years ago but I have no results.  The patient does not use tobacco products and does not use alcohol.  FH-patient's father  of a CVA and complications and had a history of cardiac bypass.  Mother is 86 and has had cardiac bypass surgery and has dementia  Chief Complaint   Patient presents with   • Annual Exam     PT HERE FOR CPE          Vitals:    19 1107   BP: 140/80   Pulse: 76   Resp: 16   Temp: 98.1 °F (36.7 °C)   SpO2: 99%     Body mass index is 30.42 kg/m².    The following portions of the patient's history were reviewed and updated as appropriate: allergies, current medications, past family history, past medical history, past social history, past surgical history and problem list.    Past Medical History:   Diagnosis Date   • Chest pain     possibly due to pericarditis   • Coronary artery disease     mild to moderate   • Heart disease    • History of positive PPD     as a child    • History of rotator cuff tear     LEFT    • Hyperlipidemia    • Hypertension       No Known Allergies   Social History     Socioeconomic History   • Marital status:      Spouse name: Kenyatta Montero   • Number of children: 2   • Years of education: 16   • Highest education level: Bachelor's degree (e.g., BA, AB, BS)   Occupational History   • Occupation: self employed with wrapping business   Tobacco Use   • Smoking status: Former Smoker     Packs/day: 1.00     Years: 14.00     Pack years: 14.00     Types: Cigarettes      Start date:      Last attempt to quit:      Years since quittin.9   • Smokeless tobacco: Never Used   • Tobacco comment: QUIT AGE 29   Substance and Sexual Activity   • Alcohol use: Yes     Comment: THREE TIMES WEEK, BEER   • Drug use: No   • Sexual activity: Defer        Current Outpatient Medications:   •  aspirin 81 MG EC tablet, Take 81 mg by mouth Every Morning., Disp: , Rfl:   •  B Complex Vitamins (VITAMIN B COMPLEX PO), Take 1 tablet by mouth Every Morning., Disp: , Rfl:   •  folic acid (FOLVITE) 400 MCG tablet, Take 400 mcg by mouth Daily., Disp: , Rfl:   •  metoprolol tartrate (LOPRESSOR) 25 MG tablet, Take 1 tablet by mouth Every 12 (Twelve) Hours., Disp: 60 tablet, Rfl: 2  •  nitroglycerin (NITROSTAT) 0.4 MG SL tablet, 1 under the tongue as needed for angina, may repeat q5mins for up three doses (Patient taking differently: Place 0.4 mg under the tongue Every 5 (Five) Minutes As Needed for Chest Pain. 1 under the tongue as needed for angina, may repeat q5mins for up three doses), Disp: 100 tablet, Rfl: 11  •  saccharomyces boulardii (FLORASTOR) 250 MG capsule, Take 250 mg by mouth Every Morning., Disp: , Rfl:   •  simvastatin (ZOCOR) 40 MG tablet, Take 1 tablet by mouth Every Night., Disp: 90 tablet, Rfl: 4  •  vitamin C (ASCORBIC ACID) 250 MG tablet, Take 250 mg by mouth Every Morning., Disp: , Rfl:   No current facility-administered medications for this visit.     Facility-Administered Medications Ordered in Other Visits:   •  Chlorhexidine Gluconate Cloth 2 % pads 1 application, 1 application, Topical, Q12H PRN, Rajwinder Carl APRN     EKG  ECG Report    Objective   History of Present Illness   Elijah Montero 63 y.o. male who presents for an Annual Wellness Visit.  he has a history of   Patient Active Problem List   Diagnosis   • Heart disease   • Hyperlipidemia   • Essential hypertension   • History of positive PPD   • Prediabetes   • Obesity (BMI 30-39.9)   • Coronary artery  disease of native artery of native heart with stable angina pectoris (CMS/HCC)   • Precordial pain   • Abnormal cardiac function test   • Coronary artery disease involving native coronary artery   • Shortness of breath   • Precordial chest pain   .  he has been doing well with new interval problems.  Labs results discussed in detail with the patient.  Plan to update vaccines if needed today.    Health Habits:  Dental Exam. up to date  Eye Exam. unknown  Exercise: 4 times/week.  Current exercise activities include: cardiovascular workout on exercise equipment      Lab Results (most recent)     None            Review of Systems   Constitutional: Negative.    HENT: Negative.    Respiratory: Negative.    Cardiovascular: Negative.    Gastrointestinal: Negative.    Genitourinary: Negative.    Musculoskeletal: Negative.    Skin: Negative.    Neurological: Negative.    Psychiatric/Behavioral: Negative.        Physical Exam   Constitutional: He is oriented to person, place, and time. He appears well-developed and well-nourished.   Pleasant, cooperative no acute distress and healthy-appearing with a blood pressure of 140-150 systolic   HENT:   Head: Normocephalic and atraumatic.   Right Ear: Hearing, tympanic membrane, external ear and ear canal normal.   Left Ear: Hearing, tympanic membrane, external ear and ear canal normal.   Nose: Nose normal. Right sinus exhibits no maxillary sinus tenderness and no frontal sinus tenderness. Left sinus exhibits no maxillary sinus tenderness and no frontal sinus tenderness.   Mouth/Throat: Uvula is midline, oropharynx is clear and moist and mucous membranes are normal. No tonsillar exudate.   Eyes: Pupils are equal, round, and reactive to light. Conjunctivae and EOM are normal. Right eye exhibits no discharge. Left eye exhibits no discharge. No scleral icterus.   Neck: Normal range of motion. Neck supple. No JVD present. No tracheal deviation present. No thyromegaly present.    Cardiovascular: Normal rate, regular rhythm, normal heart sounds and intact distal pulses. Exam reveals no gallop and no friction rub.   No murmur heard.  Pulmonary/Chest: Effort normal and breath sounds normal. No stridor. No respiratory distress. He has no wheezes. He has no rales. He exhibits no tenderness.   Abdominal: Soft. Bowel sounds are normal. He exhibits no distension and no mass. There is no tenderness. There is no rebound and no guarding. No hernia.   Genitourinary: Rectum normal, prostate normal and penis normal. Rectal exam shows guaiac negative stool.   Musculoskeletal: Normal range of motion. He exhibits no edema, tenderness or deformity.   Lymphadenopathy:     He has no cervical adenopathy.   Neurological: He is alert and oriented to person, place, and time. No cranial nerve deficit.   Skin: Skin is warm and dry.   Psychiatric: He has a normal mood and affect. His behavior is normal. Judgment and thought content normal.   Nursing note and vitals reviewed.      ASSESSMENT urinalysis is normal.  CBC is normal.  CMP had a sugar of 100 and was otherwise normal and hemoglobin A1c is elevated but acceptable at 6.2.  PSA is quite normal at 1.5.  Lipid panel is acceptable with total cholesterol of 134, and HDL of 46 and LDL of 68.  #1-hypertension, not certain about optimal control  #2-hyperlipidemia, reasonable control on medication  #3-history of coronary artery disease, status post four-vessel CABG  #4-hyperglycemia mild and asymptomatic with acceptable hemoglobin A1c       Problem List Items Addressed This Visit     None          PLAN I would like the patient to monitor his blood pressure at home.  Assuming the readings are 130 systolic or less in general, he will continue current medicines.  I have ordered a Cologuard test for him to do at home.  I would like to recheck him in 6 months with a CMP, lipid panel, hemoglobin A1c and TSH    There are no Patient Instructions on file for this visit.    No  follow-ups on file.

## 2020-01-08 ENCOUNTER — OFFICE VISIT (OUTPATIENT)
Dept: CARDIOLOGY | Age: 65
End: 2020-01-08

## 2020-01-08 VITALS
HEART RATE: 58 BPM | DIASTOLIC BLOOD PRESSURE: 88 MMHG | BODY MASS INDEX: 30.66 KG/M2 | SYSTOLIC BLOOD PRESSURE: 154 MMHG | WEIGHT: 207 LBS | HEIGHT: 69 IN

## 2020-01-08 DIAGNOSIS — I25.118 CORONARY ARTERY DISEASE OF NATIVE ARTERY OF NATIVE HEART WITH STABLE ANGINA PECTORIS (HCC): Primary | ICD-10-CM

## 2020-01-08 DIAGNOSIS — I10 ESSENTIAL HYPERTENSION: ICD-10-CM

## 2020-01-08 DIAGNOSIS — E78.5 HYPERLIPIDEMIA, UNSPECIFIED HYPERLIPIDEMIA TYPE: ICD-10-CM

## 2020-01-08 PROCEDURE — 99213 OFFICE O/P EST LOW 20 MIN: CPT | Performed by: INTERNAL MEDICINE

## 2020-01-08 NOTE — PROGRESS NOTES
Subjective:        Elijah Montero is a 64 y.o. male who here for follow up    CC  Follow-up for the coronary artery disease hypertension hyperlipidemia  HPI  64-year-old male with known history of coronary artery disease, benign essential arterial hypertension and hyperlipidemia here for the follow-up with no complaints of chest pains or tightness in the chest     Problem List Items Addressed This Visit        Cardiovascular and Mediastinum    Hyperlipidemia    Essential hypertension    Coronary artery disease of native artery of native heart with stable angina pectoris (CMS/HCC) - Primary        .Considering 1 out of the 4 bypasses is closed, and there is a retrograde flow from left to the right with good collateral circulation patient will be treated medically    The following portions of the patient's history were reviewed and updated as appropriate: allergies, current medications, past family history, past medical history, past social history, past surgical history and problem list.    Past Medical History:   Diagnosis Date   • Chest pain     possibly due to pericarditis   • Coronary artery disease     mild to moderate   • Heart disease    • History of positive PPD     as a child    • History of rotator cuff tear     LEFT 1988   • Hyperlipidemia    • Hypertension      reports that he quit smoking about 36 years ago. His smoking use included cigarettes. He started smoking about 50 years ago. He has a 14.00 pack-year smoking history. He has never used smokeless tobacco. He reports that he drinks alcohol. He reports that he does not use drugs.   Family History   Problem Relation Age of Onset   • Other Mother 65        cabg   • Heart disease Father    • Other Father 45        cabg   • Other Sister         BLOOD DISEASE    • Malig Hyperthermia Neg Hx        Review of Systems  Constitutional: No wt loss, fever, fatigue  Gastrointestinal: No nausea, abdominal pain  Behavioral/Psych: No insomnia or anxiety    "Cardiovascular no chest pains or tightness in the chest  Objective:       Physical Exam  /88 (BP Location: Left arm, Patient Position: Sitting)   Pulse 58   Ht 175.3 cm (69\")   Wt 93.9 kg (207 lb)   BMI 30.57 kg/m²   General appearance: No acute changes   Neck: Trachea midline; NECK, supple, no thyromegaly or lymphadenopathy   Lungs: Normal size and shape, normal breath sounds, equal distribution of air, no rales and rhonchi   CV: S1-S2 regular, no murmurs, no rub, no gallop   Abdomen: Soft, non-tender; no masses , no abnormal abdominal sounds   Extremities: No deformity , normal color , no peripheral edema   Skin: Normal temperature, turgor and texture; no rash, ulcers          Procedures      Echocardiogram:        Current Outpatient Medications:   •  aspirin 81 MG EC tablet, Take 81 mg by mouth Every Morning., Disp: , Rfl:   •  B Complex Vitamins (VITAMIN B COMPLEX PO), Take 1 tablet by mouth Every Morning., Disp: , Rfl:   •  folic acid (FOLVITE) 400 MCG tablet, Take 400 mcg by mouth Daily., Disp: , Rfl:   •  metoprolol tartrate (LOPRESSOR) 25 MG tablet, Take 1 tablet by mouth Every 12 (Twelve) Hours., Disp: 60 tablet, Rfl: 2  •  nitroglycerin (NITROSTAT) 0.4 MG SL tablet, 1 under the tongue as needed for angina, may repeat q5mins for up three doses (Patient taking differently: Place 0.4 mg under the tongue Every 5 (Five) Minutes As Needed for Chest Pain. 1 under the tongue as needed for angina, may repeat q5mins for up three doses), Disp: 100 tablet, Rfl: 11  •  simvastatin (ZOCOR) 40 MG tablet, Take 1 tablet by mouth Every Night., Disp: 90 tablet, Rfl: 4  •  vitamin C (ASCORBIC ACID) 250 MG tablet, Take 250 mg by mouth Every Morning., Disp: , Rfl:   No current facility-administered medications for this visit.     Facility-Administered Medications Ordered in Other Visits:   •  Chlorhexidine Gluconate Cloth 2 % pads 1 application, 1 application, Topical, Q12H PRN, Rajwinder Carl, MYLES   Assessment:    "     Patient Active Problem List   Diagnosis   • Heart disease   • Hyperlipidemia   • Essential hypertension   • History of positive PPD   • Prediabetes   • Obesity (BMI 30-39.9)   • Coronary artery disease of native artery of native heart with stable angina pectoris (CMS/HCC)   • Precordial pain   • Abnormal cardiac function test   • Coronary artery disease involving native coronary artery   • Shortness of breath   • Precordial chest pain               Plan:            ICD-10-CM ICD-9-CM   1. Coronary artery disease of native artery of native heart with stable angina pectoris (CMS/HCC) I25.118 414.01     413.9   2. Essential hypertension I10 401.9   3. Hyperlipidemia, unspecified hyperlipidemia type E78.5 272.4     1. Coronary artery disease of native artery of native heart with stable angina pectoris (CMS/HCC)  Elijah TEMPLE Kylah with coronary artery disease has no angina pectoris    Risk reduction for the coronary artery disease, controlling the blood pressure, blood sugar management, cholesterol management, exercise, stress management, and proper compliance with medications and follow-up has been discussed      2. Essential hypertension  Blood pressure under control    3. Hyperlipidemia, unspecified hyperlipidemia type  Continue current medications       cv stable    See in 1 yr  COUNSELING:    Elijah Gary was given to patient for the following topics: diagnostic results, risk factor reductions, impressions, risks and benefits of treatment options and importance of treatment compliance .       SMOKING COUNSELING:    [unfilled]    Dictated using Dragon dictation

## 2020-02-11 DIAGNOSIS — R19.5 POSITIVE COLORECTAL CANCER SCREENING USING COLOGUARD TEST: Primary | ICD-10-CM

## 2020-02-25 ENCOUNTER — OFFICE VISIT (OUTPATIENT)
Dept: SURGERY | Facility: CLINIC | Age: 65
End: 2020-02-25

## 2020-02-25 VITALS — WEIGHT: 208 LBS | HEIGHT: 69 IN | OXYGEN SATURATION: 99 % | HEART RATE: 80 BPM | BODY MASS INDEX: 30.81 KG/M2

## 2020-02-25 DIAGNOSIS — R19.5 POSITIVE COLORECTAL CANCER SCREENING USING COLOGUARD TEST: Primary | ICD-10-CM

## 2020-02-25 PROCEDURE — 99202 OFFICE O/P NEW SF 15 MIN: CPT | Performed by: SURGERY

## 2020-02-25 NOTE — PROGRESS NOTES
Subjective   Elijah Montero is a 64 y.o. male who presents to the office in surgical consultation from Nickolas Barrera MD for positive Cologuard.    History of Present Illness     The patient had a colonoscopy more than 10 years ago with a bad experience.  He was told that his bowel prep was not good and he was flushed out on the table and very uncomfortable.  He did not want another colonoscopy so he underwent a Cologuard which was positive.  He has not had any GI symptoms.  There is no abdominal pain.  He has not had any rectal bleeding nor melena.  He has a bowel movement every day, sometimes twice a day.    Review of Systems   Constitutional: Negative for fatigue and fever.   Respiratory: Negative for chest tightness and shortness of breath.    Cardiovascular: Negative for chest pain and palpitations.   Gastrointestinal: Negative for abdominal pain, blood in stool, constipation, diarrhea, nausea and vomiting.     Past Medical History:   Diagnosis Date   • Chest pain     possibly due to pericarditis   • Coronary artery disease     mild to moderate   • Heart disease    • History of positive PPD     as a child    • History of rotator cuff tear     LEFT 1988   • Hyperlipidemia    • Hypertension      Past Surgical History:   Procedure Laterality Date   • CARDIAC CATHETERIZATION      angioplasty with stent 1998   • CARDIAC CATHETERIZATION  04/06/2015   • CARDIAC CATHETERIZATION N/A 7/3/2019    Procedure: Left Heart Cath;  Surgeon: Guera Perry MD;  Location: [facility] INVASIVE LOCATION;  Service: Cardiology   • CARDIAC CATHETERIZATION N/A 7/3/2019    Procedure: Coronary angiography;  Surgeon: Guera Perry MD;  Location: [facility] INVASIVE LOCATION;  Service: Cardiology   • CARDIAC CATHETERIZATION N/A 7/3/2019    Procedure: Left ventriculography;  Surgeon: Guera Perry MD;  Location: [facility] INVASIVE LOCATION;  Service: Cardiology   • CARDIAC CATHETERIZATION N/A 8/29/2019     Procedure: Left Heart Cath;  Surgeon: Guera Perry MD;  Location: [facility] CATH INVASIVE LOCATION;  Service: Cardiology   • CARDIAC CATHETERIZATION N/A 2019    Procedure: Coronary angiography;  Surgeon: Guera Perry MD;  [facility];  Service: Cardiology   • CARDIAC CATHETERIZATION N/A 2019    Procedure: Left ventriculography;  Surgeon: Guera Perry MD;  [facility];  Service: Cardiology   • CORONARY ARTERY BYPASS GRAFT N/A 2019    Procedure: TYLOR STERNOTOMY CORONARY ARTERY BYPASS GRAFT TIMES 4 USING LEFT INTERNAL MAMMARY ARTERY AND RIGHT GREATER SAPHENOUS VEIN GRAFT PER ENDOSCOPIC VEIN HARVESTING AND PRP;  Surgeon: Gael Mccall MD;  Location: [facility] MAIN OR;  Service: Cardiothoracic   • CORONARY STENT PLACEMENT     • SKIN CANCER EXCISION N/A    • TONSILLECTOMY       Family History   Problem Relation Age of Onset   • Other Mother 65        cabg   • Heart disease Mother    • Heart disease Father    • Other Father 45        cabg   • Other Sister         BLOOD DISEASE    • Malig Hyperthermia Neg Hx      Social History     Socioeconomic History   • Marital status:      Spouse name: Kenyatta Montero   • Number of children: 2   • Years of education: 16   • Highest education level: Bachelor's degree (e.g., BA, AB, BS)   Occupational History   • Occupation: self employed with wrapping business   Tobacco Use   • Smoking status: Former Smoker     Packs/day: 1.00     Years: 14.00     Pack years: 14.00     Types: Cigarettes     Start date:      Last attempt to quit:      Years since quittin.1   • Smokeless tobacco: Never Used   • Tobacco comment: QUIT AGE 29   Substance and Sexual Activity   • Alcohol use: Yes     Comment: THREE TIMES WEEK, BEER   • Drug use: No   • Sexual activity: Defer       Objective   Physical Exam   Constitutional: He is oriented to person, place, and time. He appears  well-developed and well-nourished.  Non-toxic appearance.   Eyes: EOM are normal. No scleral icterus.   Pulmonary/Chest: Effort normal. No respiratory distress.   Abdominal: Soft. Normal appearance. There is no tenderness.   Neurological: He is alert and oriented to person, place, and time.   Skin: Skin is warm and dry.   Psychiatric: He has a normal mood and affect. His behavior is normal. Judgment and thought content normal.       Assessment/Plan       The encounter diagnosis was Positive colorectal cancer screening using Cologuard test.    The patient has no significant GI symptoms but did have a positive Cologuard.  He will need a colonoscopy.  He has been scheduled for colonoscopy.  The patient understands the indications, alternatives, risks, and benefits of the procedure and wishes to proceed.

## 2020-03-05 ENCOUNTER — HOSPITAL ENCOUNTER (OUTPATIENT)
Facility: HOSPITAL | Age: 65
Setting detail: HOSPITAL OUTPATIENT SURGERY
Discharge: HOME | End: 2020-03-05
Attending: SURGERY | Admitting: SURGERY

## 2020-03-05 ENCOUNTER — ANESTHESIA (OUTPATIENT)
Dept: GASTROENTEROLOGY | Facility: HOSPITAL | Age: 65
End: 2020-03-05

## 2020-03-05 ENCOUNTER — ANESTHESIA EVENT (OUTPATIENT)
Dept: GASTROENTEROLOGY | Facility: HOSPITAL | Age: 65
End: 2020-03-05

## 2020-03-05 VITALS
RESPIRATION RATE: 16 BRPM | OXYGEN SATURATION: 99 % | BODY MASS INDEX: 29.62 KG/M2 | HEIGHT: 69 IN | WEIGHT: 200 LBS | TEMPERATURE: 98 F | SYSTOLIC BLOOD PRESSURE: 123 MMHG | DIASTOLIC BLOOD PRESSURE: 83 MMHG | HEART RATE: 62 BPM

## 2020-03-05 DIAGNOSIS — R19.5 POSITIVE COLORECTAL CANCER SCREENING USING COLOGUARD TEST: ICD-10-CM

## 2020-03-05 PROCEDURE — 88305 TISSUE EXAM BY PATHOLOGIST: CPT | Performed by: SURGERY

## 2020-03-05 PROCEDURE — 25010000002 PROPOFOL 10 MG/ML EMULSION: Performed by: NURSE ANESTHETIST, CERTIFIED REGISTERED

## 2020-03-05 PROCEDURE — 45385 COLONOSCOPY W/LESION REMOVAL: CPT | Performed by: SURGERY

## 2020-03-05 RX ORDER — SODIUM CHLORIDE, SODIUM LACTATE, POTASSIUM CHLORIDE, CALCIUM CHLORIDE 600; 310; 30; 20 MG/100ML; MG/100ML; MG/100ML; MG/100ML
1000 INJECTION, SOLUTION INTRAVENOUS CONTINUOUS
Status: DISCONTINUED | OUTPATIENT
Start: 2020-03-05 | End: 2020-03-05 | Stop reason: HOSPADM

## 2020-03-05 RX ORDER — SODIUM CHLORIDE 0.9 % (FLUSH) 0.9 %
10 SYRINGE (ML) INJECTION AS NEEDED
Status: DISCONTINUED | OUTPATIENT
Start: 2020-03-05 | End: 2020-03-05 | Stop reason: HOSPADM

## 2020-03-05 RX ORDER — PROPOFOL 10 MG/ML
VIAL (ML) INTRAVENOUS CONTINUOUS PRN
Status: DISCONTINUED | OUTPATIENT
Start: 2020-03-05 | End: 2020-03-05 | Stop reason: SURG

## 2020-03-05 RX ORDER — LIDOCAINE HYDROCHLORIDE 10 MG/ML
0.5 INJECTION, SOLUTION INFILTRATION; PERINEURAL ONCE AS NEEDED
Status: DISCONTINUED | OUTPATIENT
Start: 2020-03-05 | End: 2020-03-05 | Stop reason: HOSPADM

## 2020-03-05 RX ORDER — LIDOCAINE HYDROCHLORIDE 20 MG/ML
INJECTION, SOLUTION INFILTRATION; PERINEURAL AS NEEDED
Status: DISCONTINUED | OUTPATIENT
Start: 2020-03-05 | End: 2020-03-05 | Stop reason: SURG

## 2020-03-05 RX ORDER — GLYCOPYRROLATE 0.2 MG/ML
INJECTION INTRAMUSCULAR; INTRAVENOUS AS NEEDED
Status: DISCONTINUED | OUTPATIENT
Start: 2020-03-05 | End: 2020-03-05 | Stop reason: SURG

## 2020-03-05 RX ORDER — PROPOFOL 10 MG/ML
VIAL (ML) INTRAVENOUS AS NEEDED
Status: DISCONTINUED | OUTPATIENT
Start: 2020-03-05 | End: 2020-03-05 | Stop reason: SURG

## 2020-03-05 RX ADMIN — EPHEDRINE SULFATE 10 MG: 50 INJECTION INTRAMUSCULAR; INTRAVENOUS; SUBCUTANEOUS at 11:02

## 2020-03-05 RX ADMIN — GLYCOPYRROLATE 0.2 MCG: 0.2 INJECTION INTRAMUSCULAR; INTRAVENOUS at 10:22

## 2020-03-05 RX ADMIN — SODIUM CHLORIDE, POTASSIUM CHLORIDE, SODIUM LACTATE AND CALCIUM CHLORIDE 1000 ML: 600; 310; 30; 20 INJECTION, SOLUTION INTRAVENOUS at 09:46

## 2020-03-05 RX ADMIN — LIDOCAINE HYDROCHLORIDE 40 MG: 20 INJECTION, SOLUTION INFILTRATION; PERINEURAL at 10:24

## 2020-03-05 RX ADMIN — PROPOFOL 50 MG: 10 INJECTION, EMULSION INTRAVENOUS at 10:24

## 2020-03-05 RX ADMIN — PROPOFOL 250 MCG/KG/MIN: 10 INJECTION, EMULSION INTRAVENOUS at 10:24

## 2020-03-05 NOTE — OP NOTE
Surgeon:     Keith Joseph Jr., M.D.    Preoperative Diagnosis:     Positive Cologuard    Postoperative Diagnosis:     Colonic polyp x2    Procedure Performed:     Colonoscopy with hot snare polypectomy    Indications:     The patient is a pleasant 64-year-old male who recently had a positive Cologuard.  He presents for colonoscopy.  The patient understands the indications, alternatives, risks, and benefits of the procedure and wishes to proceed.    Procedure:     The patient was identified, taken to the endoscopy suite, and place in the left side down decubitus procedure.  The patient underwent a MAC anesthesia and was appropriately monitored through the case by the anesthesia personnel.  A rectal exam was performed that was normal.  The colonoscope was introduced into the rectum and advanced very carefully to the cecum that was identified by the cecal strap, ileocecal valve, and the appendiceal orifice.  The scope was then slowly withdrawn with careful circumferential examination of the mucosa performed.  The bowel prep was good allowing adequate visualization of mucosal surfaces.  The scope was withdrawn.  The patient tolerated the procedure well and was transferred the recovery area in stable condition.    Findings:    There were 2 separate polyps in the colon.  There was an 8 mm sessile right colon polyp that was removed by hot snare polypectomy and retrieved.  There was also an 8 mm sessile left colon polyp that was removed by hot snare polypectomy and retrieved.    Recommendations:     1.  Await pathology results from the polypectomies.  2.  Repeat colonoscopy in 5 years.    Keith Joseph Jr., M.D.

## 2020-03-05 NOTE — ANESTHESIA POSTPROCEDURE EVALUATION
Patient: Elijah Montero    Procedure Summary     Date:  03/05/20 Room / Location:  [facility] ENDOSCOPY 9 /  [facility]    Anesthesia Start:  1021 Anesthesia Stop:  1108    Procedure:  COLONOSCOPY to cecum and t.i. with hot snare polypectomyies, biopsies (N/A ) Diagnosis:       Positive colorectal cancer screening using Cologuard test      (Positive colorectal cancer screening using Cologuard test [R19.5])    Surgeon:  Keith Joseph Jr., MD Provider:  Chucho Hendrix MD    Anesthesia Type:  MAC ASA Status:  3          Anesthesia Type: MAC    Vitals  Vitals Value Taken Time   /48 3/5/2020 11:06 AM   Temp     Pulse 69 3/5/2020 11:06 AM   Resp 16 3/5/2020 11:06 AM   SpO2 94 % 3/5/2020 11:06 AM           Post Anesthesia Care and Evaluation    Patient location during evaluation: bedside  Patient participation: complete - patient participated  Level of consciousness: awake and alert  Pain score: 0  Pain management: adequate  Airway patency: patent  Anesthetic complications: No anesthetic complications  PONV Status: none  Cardiovascular status: acceptable  Respiratory status: acceptable  Hydration status: acceptable  Post Neuraxial Block status: Motor and sensory function returned to baseline

## 2020-03-05 NOTE — ANESTHESIA PREPROCEDURE EVALUATION
Anesthesia Evaluation     Patient summary reviewed   NPO Solid Status: > 8 hours  NPO Liquid Status: > 8 hours           Airway   Mallampati: III  TM distance: >3 FB  Neck ROM: full  No difficulty expected  Dental - normal exam     Pulmonary     breath sounds clear to auscultation  Cardiovascular   Exercise tolerance: good (4-7 METS)    Rhythm: regular  Rate: normal    (+) CABG,       Neuro/Psych  GI/Hepatic/Renal/Endo      Musculoskeletal     Abdominal    Substance History      OB/GYN          Other                        Anesthesia Plan    ASA 3     MAC     intravenous induction     Anesthetic plan, all risks, benefits, and alternatives have been provided, discussed and informed consent has been obtained with: patient.

## 2020-03-06 LAB
CYTO UR: NORMAL
LAB AP CASE REPORT: NORMAL
PATH REPORT.FINAL DX SPEC: NORMAL
PATH REPORT.GROSS SPEC: NORMAL

## 2020-03-19 ENCOUNTER — TELEPHONE (OUTPATIENT)
Dept: SURGERY | Facility: CLINIC | Age: 65
End: 2020-03-19

## 2020-03-19 NOTE — PROGRESS NOTES
Spoke with patient and gave his pathology results from his colonoscopy. Advised him he will need a repeat colonoscopy in 5 yrs. Patient understood. Placed in recall for cscope in 5 yrs.

## 2020-06-15 DIAGNOSIS — I10 ESSENTIAL HYPERTENSION: Primary | ICD-10-CM

## 2020-06-15 DIAGNOSIS — E78.5 HYPERLIPIDEMIA, UNSPECIFIED HYPERLIPIDEMIA TYPE: ICD-10-CM

## 2020-06-15 DIAGNOSIS — R73.03 PREDIABETES: ICD-10-CM

## 2020-06-27 LAB
ALBUMIN SERPL-MCNC: 4.9 G/DL (ref 3.5–5.2)
ALBUMIN/GLOB SERPL: 2 G/DL
ALP SERPL-CCNC: 61 U/L (ref 39–117)
ALT SERPL-CCNC: 23 U/L (ref 1–41)
AST SERPL-CCNC: 21 U/L (ref 1–40)
BILIRUB SERPL-MCNC: 0.5 MG/DL (ref 0.2–1.2)
BUN SERPL-MCNC: 20 MG/DL (ref 8–23)
BUN/CREAT SERPL: 21.7 (ref 7–25)
CALCIUM SERPL-MCNC: 9.3 MG/DL (ref 8.6–10.5)
CHLORIDE SERPL-SCNC: 101 MMOL/L (ref 98–107)
CHOLEST SERPL-MCNC: 143 MG/DL (ref 0–200)
CO2 SERPL-SCNC: 25.5 MMOL/L (ref 22–29)
CREAT SERPL-MCNC: 0.92 MG/DL (ref 0.76–1.27)
GLOBULIN SER CALC-MCNC: 2.5 GM/DL
GLUCOSE SERPL-MCNC: 108 MG/DL (ref 65–99)
HBA1C MFR BLD: 6.14 % (ref 4.8–5.6)
HDLC SERPL-MCNC: 48 MG/DL (ref 40–60)
LDLC SERPL CALC-MCNC: 79 MG/DL (ref 0–100)
LDLC/HDLC SERPL: 1.64 {RATIO}
POTASSIUM SERPL-SCNC: 4.4 MMOL/L (ref 3.5–5.2)
PROT SERPL-MCNC: 7.4 G/DL (ref 6–8.5)
SODIUM SERPL-SCNC: 138 MMOL/L (ref 136–145)
TRIGL SERPL-MCNC: 81 MG/DL (ref 0–150)
TSH SERPL DL<=0.005 MIU/L-ACNC: 2.99 UIU/ML (ref 0.27–4.2)
VLDLC SERPL CALC-MCNC: 16.2 MG/DL

## 2020-07-10 ENCOUNTER — OFFICE VISIT (OUTPATIENT)
Dept: FAMILY MEDICINE CLINIC | Facility: CLINIC | Age: 65
End: 2020-07-10

## 2020-07-10 VITALS
WEIGHT: 207.2 LBS | DIASTOLIC BLOOD PRESSURE: 83 MMHG | HEIGHT: 69 IN | RESPIRATION RATE: 18 BRPM | OXYGEN SATURATION: 99 % | TEMPERATURE: 98.4 F | HEART RATE: 65 BPM | BODY MASS INDEX: 30.69 KG/M2 | SYSTOLIC BLOOD PRESSURE: 134 MMHG

## 2020-07-10 DIAGNOSIS — R73.03 PREDIABETES: ICD-10-CM

## 2020-07-10 DIAGNOSIS — I10 ESSENTIAL HYPERTENSION: Primary | ICD-10-CM

## 2020-07-10 DIAGNOSIS — E78.5 HYPERLIPIDEMIA, UNSPECIFIED HYPERLIPIDEMIA TYPE: ICD-10-CM

## 2020-07-10 DIAGNOSIS — I25.118 CORONARY ARTERY DISEASE OF NATIVE ARTERY OF NATIVE HEART WITH STABLE ANGINA PECTORIS (HCC): ICD-10-CM

## 2020-07-10 DIAGNOSIS — E66.9 OBESITY (BMI 30-39.9): ICD-10-CM

## 2020-07-10 PROCEDURE — 99214 OFFICE O/P EST MOD 30 MIN: CPT | Performed by: INTERNAL MEDICINE

## 2020-07-10 RX ORDER — LISINOPRIL 5 MG/1
5 TABLET ORAL DAILY
Qty: 90 TABLET | Refills: 3 | Status: SHIPPED | OUTPATIENT
Start: 2020-07-10 | End: 2020-07-10 | Stop reason: SDUPTHER

## 2020-07-10 RX ORDER — LISINOPRIL 5 MG/1
5 TABLET ORAL DAILY
Qty: 90 TABLET | Refills: 3 | Status: SHIPPED | OUTPATIENT
Start: 2020-07-10 | End: 2020-09-02 | Stop reason: SDUPTHER

## 2020-07-10 NOTE — PROGRESS NOTES
Subjective   Elijah Montero is a 64 y.o. male. Patient is here today for follow-up on his hypertension, hyperlipidemia, coronary artery disease, mild obesity and hyperglycemia.  He has some stable angina but otherwise is generally been feeling well.  He said no change in chest pain, shortness of breath, edema or myalgias.  Home blood pressure readings are infrequent but sound a bit high to me.  Chief Complaint   Patient presents with   • Follow-up     labs- hypertension and hyperlipidemia.           Vitals:    07/10/20 0757   BP: 134/83   Pulse: 65   Resp: 18   Temp: 98.4 °F (36.9 °C)   SpO2: 99%     Body mass index is 30.58 kg/m².  The following portions of the patient's history were reviewed and updated as appropriate: allergies, current medications, past family history, past medical history, past social history, past surgical history and problem list.    Past Medical History:   Diagnosis Date   • Chest pain     possibly due to pericarditis   • Coronary artery disease     mild to moderate   • Heart disease    • History of positive PPD     as a child    • History of rotator cuff tear     LEFT    • Hyperlipidemia    • Hypertension       No Known Allergies   Social History     Socioeconomic History   • Marital status:      Spouse name: Kenyatta Montero   • Number of children: 2   • Years of education: 16   • Highest education level: Bachelor's degree (e.g., BA, AB, BS)   Occupational History   • Occupation: self employed with wrapping business   Tobacco Use   • Smoking status: Former Smoker     Packs/day: 1.00     Years: 14.00     Pack years: 14.00     Types: Cigarettes     Start date:      Last attempt to quit:      Years since quittin.5   • Smokeless tobacco: Never Used   • Tobacco comment: QUIT AGE 29   Substance and Sexual Activity   • Alcohol use: Yes     Comment: THREE TIMES WEEK, BEER   • Drug use: No   • Sexual activity: Defer        Current Outpatient Medications:   •  aspirin 81 MG  EC tablet, Take 81 mg by mouth Every Morning., Disp: , Rfl:   •  B Complex Vitamins (VITAMIN B COMPLEX PO), Take 1 tablet by mouth Every Morning. ON HOLD, Disp: , Rfl:   •  folic acid (FOLVITE) 400 MCG tablet, Take 400 mcg by mouth Daily. ON HOLD, Disp: , Rfl:   •  lisinopril (PRINIVIL,ZESTRIL) 5 MG tablet, Take 1 tablet by mouth Daily., Disp: 90 tablet, Rfl: 3  •  metoprolol tartrate (LOPRESSOR) 25 MG tablet, Take 1 tablet by mouth Every 12 (Twelve) Hours., Disp: 60 tablet, Rfl: 10  •  nitroglycerin (NITROSTAT) 0.4 MG SL tablet, 1 under the tongue as needed for angina, may repeat q5mins for up three doses (Patient taking differently: Place 0.4 mg under the tongue Every 5 (Five) Minutes As Needed for Chest Pain. 1 under the tongue as needed for angina, may repeat q5mins for up three doses), Disp: 100 tablet, Rfl: 11  •  simvastatin (ZOCOR) 40 MG tablet, Take 1 tablet by mouth Every Night., Disp: 90 tablet, Rfl: 4  •  vitamin C (ASCORBIC ACID) 250 MG tablet, Take 250 mg by mouth Every Morning. ON HOLD, Disp: , Rfl:   No current facility-administered medications for this visit.     Facility-Administered Medications Ordered in Other Visits:   •  Chlorhexidine Gluconate Cloth 2 % pads 1 application, 1 application, Topical, Q12H PRN, Meseret, Rajwinder, APRN     Objective     History of Present Illness     Review of Systems   Constitutional: Negative.    HENT: Negative.    Respiratory: Negative.    Cardiovascular: Negative.    Gastrointestinal: Negative.    Genitourinary: Negative.    Musculoskeletal: Negative.    Skin: Negative.    Neurological: Negative.    Psychiatric/Behavioral: Negative.        Physical Exam   Constitutional: He is oriented to person, place, and time. He appears well-developed and well-nourished.   Pleasant, cooperative no acute distress, blood pressure 140/75   HENT:   Head: Normocephalic and atraumatic.   Eyes: Conjunctivae are normal. No scleral icterus.   Neck: Normal range of motion. Neck supple.  No thyromegaly present.   Cardiovascular: Normal rate, regular rhythm and normal heart sounds.   Pulmonary/Chest: Effort normal and breath sounds normal. No respiratory distress. He has no wheezes. He has no rales.   Musculoskeletal: Normal range of motion. He exhibits no edema.   Neurological: He is alert and oriented to person, place, and time.   Skin: Skin is warm and dry.   Psychiatric: He has a normal mood and affect. His behavior is normal.   Nursing note and vitals reviewed.      ASSESSMENT lipid panel is reasonable with a total cholesterol of 143, HDL 48 and LDL just a bit higher at 79.  CMP has a sugar of 108 and is otherwise normal and TSH is quite normal.  Hemoglobin A1c is stable and slightly improved at 6.14.  #1-hypertension, not optimally controlled  #2-hyperlipidemia, overall fairly reasonable control but needs to take his simvastatin regularly  #3-hyperglycemia, mild with acceptable hemoglobin A1c, diet controlled  #4-coronary artery disease, stable  #5-mild obesity with weight stable     Problem List Items Addressed This Visit        Cardiovascular and Mediastinum    Hyperlipidemia    Essential hypertension - Primary    Relevant Medications    lisinopril (PRINIVIL,ZESTRIL) 5 MG tablet    Coronary artery disease of native artery of native heart with stable angina pectoris (CMS/[facility])       Digestive    Obesity (BMI 30-39.9)       Endocrine    Prediabetes          PLAN I am going to add in lisinopril 5 mg daily in addition to his metoprolol for his blood pressure.  I want him to take his simvastatin regularly and watch dietary carbs and sweets.  I would like to recheck him in 3 months with a CMP, lipid panel    There are no Patient Instructions on file for this visit.  Return in about 3 months (around 10/10/2020) for with labs.

## 2020-09-02 DIAGNOSIS — I10 ESSENTIAL HYPERTENSION: Primary | ICD-10-CM

## 2020-09-02 RX ORDER — LISINOPRIL 5 MG/1
5 TABLET ORAL DAILY
Qty: 90 TABLET | Refills: 2 | Status: SHIPPED | OUTPATIENT
Start: 2020-09-02 | End: 2020-12-25 | Stop reason: SDUPTHER

## 2020-09-25 DIAGNOSIS — I10 ESSENTIAL HYPERTENSION: ICD-10-CM

## 2020-09-25 DIAGNOSIS — E78.5 HYPERLIPIDEMIA, UNSPECIFIED HYPERLIPIDEMIA TYPE: Primary | ICD-10-CM

## 2020-10-14 LAB
ALBUMIN SERPL-MCNC: 5 G/DL (ref 3.5–5.2)
ALBUMIN/GLOB SERPL: 2.3 G/DL
ALP SERPL-CCNC: 69 U/L (ref 39–117)
ALT SERPL-CCNC: 30 U/L (ref 1–41)
AST SERPL-CCNC: 26 U/L (ref 1–40)
BILIRUB SERPL-MCNC: 0.4 MG/DL (ref 0–1.2)
BUN SERPL-MCNC: 15 MG/DL (ref 8–23)
BUN/CREAT SERPL: 15 (ref 7–25)
CALCIUM SERPL-MCNC: 9.4 MG/DL (ref 8.6–10.5)
CHLORIDE SERPL-SCNC: 102 MMOL/L (ref 98–107)
CHOLEST SERPL-MCNC: 156 MG/DL (ref 0–200)
CO2 SERPL-SCNC: 27 MMOL/L (ref 22–29)
CREAT SERPL-MCNC: 1 MG/DL (ref 0.76–1.27)
GLOBULIN SER CALC-MCNC: 2.2 GM/DL
GLUCOSE SERPL-MCNC: 97 MG/DL (ref 65–99)
HDLC SERPL-MCNC: 58 MG/DL (ref 40–60)
LDLC SERPL CALC-MCNC: 76 MG/DL (ref 0–100)
LDLC/HDLC SERPL: 1.25 {RATIO}
POTASSIUM SERPL-SCNC: 4.9 MMOL/L (ref 3.5–5.2)
PROT SERPL-MCNC: 7.2 G/DL (ref 6–8.5)
SODIUM SERPL-SCNC: 138 MMOL/L (ref 136–145)
TRIGL SERPL-MCNC: 128 MG/DL (ref 0–150)
VLDLC SERPL CALC-MCNC: 22 MG/DL (ref 5–40)

## 2020-10-16 ENCOUNTER — OFFICE VISIT (OUTPATIENT)
Dept: FAMILY MEDICINE CLINIC | Facility: CLINIC | Age: 65
End: 2020-10-16

## 2020-10-16 VITALS
TEMPERATURE: 97.5 F | HEIGHT: 69 IN | WEIGHT: 209.4 LBS | OXYGEN SATURATION: 96 % | RESPIRATION RATE: 18 BRPM | BODY MASS INDEX: 31.01 KG/M2 | DIASTOLIC BLOOD PRESSURE: 72 MMHG | SYSTOLIC BLOOD PRESSURE: 130 MMHG | HEART RATE: 67 BPM

## 2020-10-16 DIAGNOSIS — I10 ESSENTIAL HYPERTENSION: Primary | ICD-10-CM

## 2020-10-16 DIAGNOSIS — E66.9 OBESITY (BMI 30-39.9): ICD-10-CM

## 2020-10-16 DIAGNOSIS — E78.5 HYPERLIPIDEMIA, UNSPECIFIED HYPERLIPIDEMIA TYPE: ICD-10-CM

## 2020-10-16 DIAGNOSIS — R73.03 PREDIABETES: ICD-10-CM

## 2020-10-16 DIAGNOSIS — I25.10 CORONARY ARTERY DISEASE INVOLVING NATIVE CORONARY ARTERY OF NATIVE HEART WITHOUT ANGINA PECTORIS: ICD-10-CM

## 2020-10-16 PROCEDURE — 90686 IIV4 VACC NO PRSV 0.5 ML IM: CPT | Performed by: INTERNAL MEDICINE

## 2020-10-16 PROCEDURE — 99214 OFFICE O/P EST MOD 30 MIN: CPT | Performed by: INTERNAL MEDICINE

## 2020-10-16 PROCEDURE — 90471 IMMUNIZATION ADMIN: CPT | Performed by: INTERNAL MEDICINE

## 2020-10-16 RX ORDER — EZETIMIBE 10 MG/1
10 TABLET ORAL DAILY
Qty: 90 TABLET | Refills: 3 | Status: SHIPPED | OUTPATIENT
Start: 2020-10-16 | End: 2021-03-01 | Stop reason: SDUPTHER

## 2020-10-16 RX ORDER — SIMVASTATIN 40 MG
40 TABLET ORAL NIGHTLY
Qty: 90 TABLET | Refills: 4 | Status: SHIPPED | OUTPATIENT
Start: 2020-10-16 | End: 2020-12-17

## 2020-10-16 NOTE — PROGRESS NOTES
Subjective   Elijah Montero is a 64 y.o. male. Patient is here today for follow-up on his hypertension, hyperlipidemia, history of coronary artery disease, obesity and prediabetes.  Patient's been stable and has had no chest pain, shortness of breath, edema or myalgias and is tolerating medicines without any problem.  Chief Complaint   Patient presents with   • Hyperlipidemia     f/u labs   • Hypertension          Vitals:    10/16/20 1046   BP: 130/72   Pulse: 67   Resp: 18   Temp: 97.5 °F (36.4 °C)   SpO2: 96%     Body mass index is 30.91 kg/m².  The following portions of the patient's history were reviewed and updated as appropriate: allergies, current medications, past family history, past medical history, past social history, past surgical history and problem list.    Past Medical History:   Diagnosis Date   • Chest pain     possibly due to pericarditis   • Coronary artery disease     mild to moderate   • Heart disease    • History of positive PPD     as a child    • History of rotator cuff tear     LEFT    • Hyperlipidemia    • Hypertension       No Known Allergies   Social History     Socioeconomic History   • Marital status:      Spouse name: Kenyatta Montero   • Number of children: 2   • Years of education: 16   • Highest education level: Bachelor's degree (e.g., BA, AB, BS)   Occupational History   • Occupation: self employed with wrapping business   Tobacco Use   • Smoking status: Former Smoker     Packs/day: 1.00     Years: 14.00     Pack years: 14.00     Types: Cigarettes     Start date:      Quit date:      Years since quittin.8   • Smokeless tobacco: Never Used   • Tobacco comment: QUIT AGE 29   Substance and Sexual Activity   • Alcohol use: Yes     Comment: THREE TIMES WEEK, BEER   • Drug use: No   • Sexual activity: Defer        Current Outpatient Medications:   •  aspirin 81 MG EC tablet, Take 81 mg by mouth Every Morning., Disp: , Rfl:   •  B Complex Vitamins (VITAMIN B  COMPLEX PO), Take 1 tablet by mouth Every Morning. ON HOLD, Disp: , Rfl:   •  folic acid (FOLVITE) 400 MCG tablet, Take 400 mcg by mouth Daily. ON HOLD, Disp: , Rfl:   •  lisinopril (PRINIVIL,ZESTRIL) 5 MG tablet, Take 1 tablet by mouth Daily., Disp: 90 tablet, Rfl: 2  •  metoprolol tartrate (LOPRESSOR) 25 MG tablet, Take 1 tablet by mouth Every 12 (Twelve) Hours., Disp: 60 tablet, Rfl: 10  •  nitroglycerin (NITROSTAT) 0.4 MG SL tablet, 1 under the tongue as needed for angina, may repeat q5mins for up three doses (Patient taking differently: Place 0.4 mg under the tongue Every 5 (Five) Minutes As Needed for Chest Pain. 1 under the tongue as needed for angina, may repeat q5mins for up three doses), Disp: 100 tablet, Rfl: 11  •  simvastatin (Zocor) 40 MG tablet, Take 1 tablet by mouth Every Night., Disp: 90 tablet, Rfl: 4  •  vitamin C (ASCORBIC ACID) 250 MG tablet, Take 250 mg by mouth Every Morning. ON HOLD, Disp: , Rfl:   •  ezetimibe (Zetia) 10 MG tablet, Take 1 tablet by mouth Daily., Disp: 90 tablet, Rfl: 3  No current facility-administered medications for this visit.     Facility-Administered Medications Ordered in Other Visits:   •  Chlorhexidine Gluconate Cloth 2 % pads 1 application, 1 application, Topical, Q12H PRN, Meseret, Rajwinder, APRN     Objective     History of Present Illness     Review of Systems   Constitutional: Negative.    HENT: Negative.    Respiratory: Negative.    Cardiovascular: Negative.    Gastrointestinal: Negative.    Genitourinary: Negative.    Musculoskeletal: Negative.    Skin: Negative.    Neurological: Negative.    Psychiatric/Behavioral: Negative.        Physical Exam  Vitals signs (130/70) and nursing note reviewed.   Constitutional:       General: He is not in acute distress.     Appearance: Normal appearance. He is obese. He is not ill-appearing.   HENT:      Head: Normocephalic and atraumatic.   Eyes:      General: No scleral icterus.     Conjunctiva/sclera: Conjunctivae normal.    Neck:      Musculoskeletal: Normal range of motion and neck supple.   Cardiovascular:      Rate and Rhythm: Normal rate and regular rhythm.      Heart sounds: Normal heart sounds.   Pulmonary:      Effort: Pulmonary effort is normal. No respiratory distress.      Breath sounds: Normal breath sounds. No wheezing or rales.   Musculoskeletal: Normal range of motion.   Skin:     General: Skin is warm and dry.   Neurological:      General: No focal deficit present.      Mental Status: He is alert and oriented to person, place, and time.   Psychiatric:         Mood and Affect: Mood normal.         Behavior: Behavior normal.         ASSESSMENT CMP was normal.  Lipid panel was stable with total cholesterol 156, HDL 58, LDL 76.  #1-hypertension, controlled on medication  #2-hyperlipidemia, pretty good control but not optimal  #3-history of coronary artery disease, asymptomatic  #4-history of hyperglycemia with normal sugar today  #5-obesity with just minimally elevated weight     Problems Addressed this Visit        Cardiovascular and Mediastinum    Hyperlipidemia    Relevant Medications    ezetimibe (Zetia) 10 MG tablet    simvastatin (Zocor) 40 MG tablet    Essential hypertension - Primary    Coronary artery disease involving native coronary artery       Digestive    Obesity (BMI 30-39.9)       Endocrine    Prediabetes      Diagnoses       Codes Comments    Essential hypertension    -  Primary ICD-10-CM: I10  ICD-9-CM: 401.9     Hyperlipidemia, unspecified hyperlipidemia type     ICD-10-CM: E78.5  ICD-9-CM: 272.4     Coronary artery disease involving native coronary artery of native heart without angina pectoris     ICD-10-CM: I25.10  ICD-9-CM: 414.01     Prediabetes     ICD-10-CM: R73.03  ICD-9-CM: 790.29     Obesity (BMI 30-39.9)     ICD-10-CM: E66.9  ICD-9-CM: 278.00           PLAN the patient received a flu shot today and I recommended the shingles immunizations for the patient.  Rather than increase his simvastatin  to the maximum dose, I am going to add in Zetia 10 mg daily for his cholesterol.  He will continue other medicines as now.  I would like to recheck him in 6 months with a CBC, CMP, lipid panel, hemoglobin A1c, TSH and PSA    There are no Patient Instructions on file for this visit.  Return in about 6 months (around 4/16/2021) for with labs.

## 2020-12-17 RX ORDER — SIMVASTATIN 40 MG
TABLET ORAL
Qty: 90 TABLET | Refills: 1 | Status: SHIPPED | OUTPATIENT
Start: 2020-12-17 | End: 2020-12-25 | Stop reason: SDUPTHER

## 2020-12-25 DIAGNOSIS — I10 ESSENTIAL HYPERTENSION: ICD-10-CM

## 2020-12-28 RX ORDER — LISINOPRIL 5 MG/1
5 TABLET ORAL DAILY
Qty: 90 TABLET | Refills: 2 | Status: SHIPPED | OUTPATIENT
Start: 2020-12-28 | End: 2021-08-26 | Stop reason: SDUPTHER

## 2020-12-28 RX ORDER — SIMVASTATIN 40 MG
40 TABLET ORAL EVERY EVENING
Qty: 90 TABLET | Refills: 1 | Status: SHIPPED | OUTPATIENT
Start: 2020-12-28 | End: 2021-05-07 | Stop reason: SDUPTHER

## 2021-03-03 RX ORDER — EZETIMIBE 10 MG/1
10 TABLET ORAL DAILY
Qty: 90 TABLET | Refills: 1 | Status: SHIPPED | OUTPATIENT
Start: 2021-03-03 | End: 2021-05-03 | Stop reason: SDUPTHER

## 2021-03-19 ENCOUNTER — BULK ORDERING (OUTPATIENT)
Dept: CASE MANAGEMENT | Facility: OTHER | Age: 66
End: 2021-03-19

## 2021-03-19 DIAGNOSIS — Z23 IMMUNIZATION DUE: ICD-10-CM

## 2021-05-04 RX ORDER — EZETIMIBE 10 MG/1
10 TABLET ORAL DAILY
Qty: 30 TABLET | Refills: 0 | Status: SHIPPED | OUTPATIENT
Start: 2021-05-04 | End: 2021-05-07 | Stop reason: SDUPTHER

## 2021-05-07 ENCOUNTER — OFFICE VISIT (OUTPATIENT)
Dept: FAMILY MEDICINE CLINIC | Facility: CLINIC | Age: 66
End: 2021-05-07

## 2021-05-07 VITALS
TEMPERATURE: 97.3 F | HEART RATE: 64 BPM | BODY MASS INDEX: 30.42 KG/M2 | WEIGHT: 205.4 LBS | OXYGEN SATURATION: 100 % | HEIGHT: 69 IN | RESPIRATION RATE: 18 BRPM | SYSTOLIC BLOOD PRESSURE: 122 MMHG | DIASTOLIC BLOOD PRESSURE: 80 MMHG

## 2021-05-07 DIAGNOSIS — E78.5 HYPERLIPIDEMIA, UNSPECIFIED HYPERLIPIDEMIA TYPE: ICD-10-CM

## 2021-05-07 DIAGNOSIS — I25.10 CORONARY ARTERY DISEASE INVOLVING NATIVE CORONARY ARTERY OF NATIVE HEART WITHOUT ANGINA PECTORIS: ICD-10-CM

## 2021-05-07 DIAGNOSIS — I10 ESSENTIAL HYPERTENSION: Primary | ICD-10-CM

## 2021-05-07 DIAGNOSIS — E66.9 OBESITY (BMI 30-39.9): ICD-10-CM

## 2021-05-07 DIAGNOSIS — R73.03 PREDIABETES: ICD-10-CM

## 2021-05-07 PROCEDURE — 99214 OFFICE O/P EST MOD 30 MIN: CPT | Performed by: INTERNAL MEDICINE

## 2021-05-07 RX ORDER — EZETIMIBE 10 MG/1
10 TABLET ORAL DAILY
Qty: 90 TABLET | Refills: 2 | Status: SHIPPED | OUTPATIENT
Start: 2021-05-07 | End: 2022-01-24 | Stop reason: SDUPTHER

## 2021-05-07 RX ORDER — SIMVASTATIN 40 MG
40 TABLET ORAL EVERY EVENING
Qty: 90 TABLET | Refills: 1 | Status: SHIPPED | OUTPATIENT
Start: 2021-05-07 | End: 2022-01-24 | Stop reason: SDUPTHER

## 2021-05-07 NOTE — PROGRESS NOTES
Subjective   Elijah Montero is a 65 y.o. male. Patient is here today for follow-up on his hypertension, hyperlipidemia, hyperglycemia and history of coronary artery disease.  The patient's generally stable and is mildly obese.  He has had no significant chest pain, shortness of breath edema or myalgias.  He did have some reactive adenopathy with the COVID-19 vaccination but that is improving.  Otherwise he feels well.  Chief Complaint   Patient presents with   • Hypertension     HYPERLIPIDEMIA- FOLLOW UP LABS          Vitals:    21 0801   BP: 122/80   Pulse: 64   Resp: 18   Temp: 97.3 °F (36.3 °C)   SpO2: 100%     Body mass index is 30.32 kg/m².  The following portions of the patient's history were reviewed and updated as appropriate: allergies, current medications, past family history, past medical history, past social history, past surgical history and problem list.    Past Medical History:   Diagnosis Date   • Chest pain     possibly due to pericarditis   • Coronary artery disease     mild to moderate   • Heart disease    • History of positive PPD     as a child    • History of rotator cuff tear     LEFT    • Hyperlipidemia    • Hypertension       No Known Allergies   Social History     Socioeconomic History   • Marital status:      Spouse name: Kenyatta Montero   • Number of children: 2   • Years of education: 16   • Highest education level: Bachelor's degree (e.g., BA, AB, BS)   Tobacco Use   • Smoking status: Former Smoker     Packs/day: 1.00     Years: 14.00     Pack years: 14.00     Types: Cigarettes     Start date:      Quit date:      Years since quittin.3   • Smokeless tobacco: Never Used   • Tobacco comment: QUIT AGE 29   Substance and Sexual Activity   • Alcohol use: Yes     Comment: THREE TIMES WEEK, BEER   • Drug use: No   • Sexual activity: Defer        Current Outpatient Medications:   •  aspirin 81 MG EC tablet, Take 81 mg by mouth Every Morning., Disp: , Rfl:   •  B  Complex Vitamins (VITAMIN B COMPLEX PO), Take 1 tablet by mouth Every Morning. ON HOLD, Disp: , Rfl:   •  ezetimibe (Zetia) 10 MG tablet, Take 1 tablet by mouth Daily., Disp: 90 tablet, Rfl: 2  •  folic acid (FOLVITE) 400 MCG tablet, Take 400 mcg by mouth Daily. ON HOLD, Disp: , Rfl:   •  lisinopril (PRINIVIL,ZESTRIL) 5 MG tablet, Take 1 tablet by mouth Daily., Disp: 90 tablet, Rfl: 2  •  metoprolol tartrate (LOPRESSOR) 25 MG tablet, Take 1 tablet by mouth Every 12 (Twelve) Hours. PLEASE SCHEDULE AN APPOINTMENT FOR FURTHER REFILLS., Disp: 180 tablet, Rfl: 3  •  nitroglycerin (NITROSTAT) 0.4 MG SL tablet, 1 under the tongue as needed for angina, may repeat q5mins for up three doses (Patient taking differently: Place 0.4 mg under the tongue Every 5 (Five) Minutes As Needed for Chest Pain. 1 under the tongue as needed for angina, may repeat q5mins for up three doses), Disp: 100 tablet, Rfl: 11  •  simvastatin (ZOCOR) 40 MG tablet, Take 1 tablet by mouth Every Evening., Disp: 90 tablet, Rfl: 1  •  vitamin C (ASCORBIC ACID) 250 MG tablet, Take 250 mg by mouth Every Morning. ON HOLD, Disp: , Rfl:   No current facility-administered medications for this visit.    Facility-Administered Medications Ordered in Other Visits:   •  Chlorhexidine Gluconate Cloth 2 % pads 1 application, 1 application, Topical, Q12H PRN, Meseret, Rajwinder, MYLES     Objective     History of Present Illness     Review of Systems   HENT: Negative.    Respiratory: Negative.    Cardiovascular: Negative.  Negative for chest pain.   Gastrointestinal: Negative.  Negative for abdominal pain.   Genitourinary: Negative.  Negative for dysuria.   Musculoskeletal: Positive for back pain.   Neurological: Positive for weakness. Negative for numbness and headaches.   Hematological: Negative.    Psychiatric/Behavioral: Negative.        Physical Exam  Vitals and nursing note reviewed.   Constitutional:       General: He is not in acute distress.     Appearance: Normal  appearance. He is not ill-appearing.   HENT:      Head: Normocephalic and atraumatic.   Eyes:      General: No scleral icterus.     Conjunctiva/sclera: Conjunctivae normal.   Cardiovascular:      Rate and Rhythm: Normal rate and regular rhythm.      Heart sounds: Normal heart sounds.   Pulmonary:      Effort: Pulmonary effort is normal. No respiratory distress.      Breath sounds: Normal breath sounds. No wheezing or rales.   Musculoskeletal:         General: Normal range of motion.      Cervical back: Normal range of motion and neck supple.   Skin:     General: Skin is warm and dry.   Neurological:      General: No focal deficit present.      Mental Status: He is alert and oriented to person, place, and time.   Psychiatric:         Mood and Affect: Mood normal.         Behavior: Behavior normal.         ASSESSMENT CBC is completely normal.  CMP was completely normal as well.  Lipid panel stable with total cholesterol 144, HDL 47, LDL just minimally high at 76.  Hemoglobin A1c is slightly improved at 6.1.  TSH was quite normal and PSA is normal and stable.  #1-hypertension controlled on medication  #2-hyperlipidemia, reasonable control on medication  #3-coronary artery disease, asymptomatic  #4-hyperglycemia, stable with stable acceptable hemoglobin A1c  #5-very mild obesity, stable     Problems Addressed this Visit        Cardiac and Vasculature    Hyperlipidemia    Relevant Medications    simvastatin (ZOCOR) 40 MG tablet    ezetimibe (Zetia) 10 MG tablet    Essential hypertension - Primary    Relevant Medications    metoprolol tartrate (LOPRESSOR) 25 MG tablet    Coronary artery disease involving native coronary artery    Relevant Medications    metoprolol tartrate (LOPRESSOR) 25 MG tablet       Endocrine and Metabolic    Prediabetes    Obesity (BMI 30-39.9)      Diagnoses       Codes Comments    Essential hypertension    -  Primary ICD-10-CM: I10  ICD-9-CM: 401.9     Hyperlipidemia, unspecified hyperlipidemia  type     ICD-10-CM: E78.5  ICD-9-CM: 272.4     Coronary artery disease involving native coronary artery of native heart without angina pectoris     ICD-10-CM: I25.10  ICD-9-CM: 414.01     Obesity (BMI 30-39.9)     ICD-10-CM: E66.9  ICD-9-CM: 278.00     Prediabetes     ICD-10-CM: R73.03  ICD-9-CM: 790.29           PLAN I recommended the patient get the Shingrix immunizations.  He will try and watch dietary fats and continue current medicines as now.  I plan on rechecking him in 6 months for complete physical exam including an EKG if not done recently, CBC, CMP, lipid panel, hemoglobin A1c, TSH and free T4, urinalysis and a chest x-ray because of his history of cardiac surgery    There are no Patient Instructions on file for this visit.  Return in about 6 months (around 11/7/2021) for with labs, Annual physical.

## 2021-08-17 ENCOUNTER — OFFICE VISIT (OUTPATIENT)
Dept: FAMILY MEDICINE CLINIC | Facility: CLINIC | Age: 66
End: 2021-08-17

## 2021-08-17 ENCOUNTER — HOSPITAL ENCOUNTER (OUTPATIENT)
Dept: GENERAL RADIOLOGY | Facility: HOSPITAL | Age: 66
Discharge: HOME | End: 2021-08-17
Admitting: INTERNAL MEDICINE

## 2021-08-17 VITALS
DIASTOLIC BLOOD PRESSURE: 80 MMHG | SYSTOLIC BLOOD PRESSURE: 120 MMHG | BODY MASS INDEX: 30.81 KG/M2 | HEIGHT: 69 IN | RESPIRATION RATE: 18 BRPM | TEMPERATURE: 97.5 F | HEART RATE: 78 BPM | OXYGEN SATURATION: 97 % | WEIGHT: 208 LBS

## 2021-08-17 DIAGNOSIS — I10 ESSENTIAL HYPERTENSION: ICD-10-CM

## 2021-08-17 DIAGNOSIS — R05.9 COUGH: ICD-10-CM

## 2021-08-17 DIAGNOSIS — I25.10 CORONARY ARTERY DISEASE INVOLVING NATIVE CORONARY ARTERY OF NATIVE HEART WITHOUT ANGINA PECTORIS: ICD-10-CM

## 2021-08-17 DIAGNOSIS — J30.89 ENVIRONMENTAL AND SEASONAL ALLERGIES: ICD-10-CM

## 2021-08-17 DIAGNOSIS — R05.9 COUGH: Primary | ICD-10-CM

## 2021-08-17 PROCEDURE — 71046 X-RAY EXAM CHEST 2 VIEWS: CPT

## 2021-08-17 PROCEDURE — 99214 OFFICE O/P EST MOD 30 MIN: CPT | Performed by: INTERNAL MEDICINE

## 2021-08-17 NOTE — PROGRESS NOTES
Subjective   Elijah Montero is a 65 y.o. male. Patient is here today for complaints of a cough that is been going on for 2 months.  He said the baby coughed in his face and he was sick the next day.  He recovered from that acute illness but is continued with a cough for the last 2 months and gets some mild shortness of breath.  Is having no fevers chills or pleurisy and the cough is nonproductive.  He is primarily concerned that is been going on so long.  Secondly he has noticed a mass in the palm of his left hand that may be on the flexor tendon of his third finger.  He is having no pain or interference with function  Chief Complaint   Patient presents with   • Cough     SHORTNESS OF BREATH- PT SAID HE GOT SICK 2 1/2 MONTHS AGO AFTER BEING AROUND A BABY AND THE COUGH HAS NOT GONE AWAY           Vitals:    21 1441   BP: 120/80   Pulse: 78   Resp: 18   Temp: 97.5 °F (36.4 °C)   SpO2: 97%     Body mass index is 30.7 kg/m².  The following portions of the patient's history were reviewed and updated as appropriate: allergies, current medications, past family history, past medical history, past social history, past surgical history and problem list.    Past Medical History:   Diagnosis Date   • Chest pain     possibly due to pericarditis   • Coronary artery disease     mild to moderate   • Heart disease    • History of positive PPD     as a child    • History of rotator cuff tear     LEFT    • Hyperlipidemia    • Hypertension       No Known Allergies   Social History     Socioeconomic History   • Marital status:      Spouse name: Kenyatta Montero   • Number of children: 2   • Years of education: 16   • Highest education level: Bachelor's degree (e.g., BA, AB, BS)   Tobacco Use   • Smoking status: Former Smoker     Packs/day: 1.00     Years: 14.00     Pack years: 14.00     Types: Cigarettes     Start date:      Quit date:      Years since quittin.6   • Smokeless tobacco: Never Used   •  Tobacco comment: QUIT AGE 29   Substance and Sexual Activity   • Alcohol use: Yes     Comment: THREE TIMES WEEK, BEER   • Drug use: No   • Sexual activity: Defer        Current Outpatient Medications:   •  aspirin 81 MG EC tablet, Take 81 mg by mouth Every Morning., Disp: , Rfl:   •  B Complex Vitamins (VITAMIN B COMPLEX PO), Take 1 tablet by mouth Every Morning. ON HOLD, Disp: , Rfl:   •  ezetimibe (Zetia) 10 MG tablet, Take 1 tablet by mouth Daily., Disp: 90 tablet, Rfl: 2  •  folic acid (FOLVITE) 400 MCG tablet, Take 400 mcg by mouth Daily. ON HOLD, Disp: , Rfl:   •  lisinopril (PRINIVIL,ZESTRIL) 5 MG tablet, Take 1 tablet by mouth Daily., Disp: 90 tablet, Rfl: 2  •  metoprolol tartrate (LOPRESSOR) 25 MG tablet, Take 1 tablet by mouth Every 12 (Twelve) Hours. PLEASE SCHEDULE AN APPOINTMENT FOR FURTHER REFILLS., Disp: 180 tablet, Rfl: 3  •  nitroglycerin (NITROSTAT) 0.4 MG SL tablet, 1 under the tongue as needed for angina, may repeat q5mins for up three doses (Patient taking differently: Place 0.4 mg under the tongue Every 5 (Five) Minutes As Needed for Chest Pain. 1 under the tongue as needed for angina, may repeat q5mins for up three doses), Disp: 100 tablet, Rfl: 11  •  simvastatin (ZOCOR) 40 MG tablet, Take 1 tablet by mouth Every Evening., Disp: 90 tablet, Rfl: 1  •  vitamin C (ASCORBIC ACID) 250 MG tablet, Take 250 mg by mouth Every Morning. ON HOLD, Disp: , Rfl:   No current facility-administered medications for this visit.    Facility-Administered Medications Ordered in Other Visits:   •  Chlorhexidine Gluconate Cloth 2 % pads 1 application, 1 application, Topical, Q12H PRN, Meseret, Rajwinder, MYLES     Objective     History of Present Illness     Review of Systems   Constitutional: Negative.    HENT: Negative.    Respiratory: Negative.    Cardiovascular: Negative.    Gastrointestinal: Negative.    Genitourinary: Negative.    Musculoskeletal: Negative.    Skin: Negative.    Allergic/Immunologic: Positive for  environmental allergies.   Neurological: Negative.    Hematological: Negative.    Psychiatric/Behavioral: Negative.        Physical Exam  Vitals and nursing note reviewed.   Constitutional:       General: He is not in acute distress.     Appearance: Normal appearance. He is not ill-appearing.   HENT:      Head: Normocephalic and atraumatic.   Eyes:      General: No scleral icterus.     Conjunctiva/sclera: Conjunctivae normal.   Cardiovascular:      Rate and Rhythm: Normal rate and regular rhythm.      Heart sounds: Normal heart sounds.   Pulmonary:      Effort: Pulmonary effort is normal. No respiratory distress.      Breath sounds: Normal breath sounds. No stridor. No wheezing, rhonchi or rales.   Chest:      Chest wall: No tenderness.   Musculoskeletal:         General: Normal range of motion.      Cervical back: Normal range of motion and neck supple.      Comments: There is a soft tissue mass in the palm of the left hand in the area of the third flexor tendon is not causing any symptoms or loss of function   Skin:     General: Skin is warm and dry.   Neurological:      General: No focal deficit present.      Mental Status: He is alert and oriented to person, place, and time.   Psychiatric:         Mood and Affect: Mood normal.         Behavior: Behavior normal.         ASSESSMENT #1-persistent cough for 2 months  #2-a benign appearing asymptomatic mass in the palm of the left hand, will continue to observe     Problems Addressed this Visit        Allergies and Adverse Reactions    Environmental and seasonal allergies       Cardiac and Vasculature    Essential hypertension    Coronary artery disease involving native coronary artery      Other Visit Diagnoses     Cough    -  Primary    Relevant Orders    XR Chest PA & Lateral    COVID-19,[facility] ROUTINE, NP/OP SWAB IN TRANSPORT MEDIA OR ESWAB 72 HR TAT - Swab, Oropharynx      Diagnoses       Codes Comments    Cough    -  Primary ICD-10-CM: R05  ICD-9-CM: 786.2      Essential hypertension     ICD-10-CM: I10  ICD-9-CM: 401.9     Coronary artery disease involving native coronary artery of native heart without angina pectoris     ICD-10-CM: I25.10  ICD-9-CM: 414.01     Environmental and seasonal allergies     ICD-10-CM: J30.89  ICD-9-CM: 477.8           PLAN I have ordered a Covid test on the patient and have ordered a chest x-ray and I would like to recheck him next week.  Another consideration could possibly be related to his lisinopril    There are no Patient Instructions on file for this visit.  Return in about 1 week (around 8/24/2021).

## 2021-08-18 LAB
LABCORP SARS-COV-2, NAA 2 DAY TAT: NORMAL
SARS-COV-2 RNA RESP QL NAA+PROBE: NOT DETECTED

## 2021-08-26 ENCOUNTER — OFFICE VISIT (OUTPATIENT)
Dept: FAMILY MEDICINE CLINIC | Facility: CLINIC | Age: 66
End: 2021-08-26

## 2021-08-26 VITALS
BODY MASS INDEX: 30.66 KG/M2 | SYSTOLIC BLOOD PRESSURE: 140 MMHG | DIASTOLIC BLOOD PRESSURE: 80 MMHG | RESPIRATION RATE: 18 BRPM | TEMPERATURE: 97.5 F | OXYGEN SATURATION: 98 % | WEIGHT: 207 LBS | HEART RATE: 65 BPM | HEIGHT: 69 IN

## 2021-08-26 DIAGNOSIS — J40 BRONCHITIS: Primary | ICD-10-CM

## 2021-08-26 DIAGNOSIS — R06.02 SHORTNESS OF BREATH: ICD-10-CM

## 2021-08-26 DIAGNOSIS — I10 ESSENTIAL HYPERTENSION: ICD-10-CM

## 2021-08-26 DIAGNOSIS — J30.89 ENVIRONMENTAL AND SEASONAL ALLERGIES: ICD-10-CM

## 2021-08-26 PROCEDURE — 99214 OFFICE O/P EST MOD 30 MIN: CPT | Performed by: INTERNAL MEDICINE

## 2021-08-26 RX ORDER — LISINOPRIL 5 MG/1
5 TABLET ORAL DAILY
Qty: 90 TABLET | Refills: 2 | Status: SHIPPED | OUTPATIENT
Start: 2021-08-26 | End: 2022-01-24 | Stop reason: SDUPTHER

## 2021-08-26 NOTE — PROGRESS NOTES
Subjective   Elijah Montero is a 65 y.o. male. Patient is here today for follow-up on his coughing, shortness of breath.  Patient relates to me that he has had some wheezing episodes and his wife says he still has some wheezing at times.  Overall the patient feels that he is improved from his last visit.  He denies any severe wheezing, pleurisy or chest pain  Chief Complaint   Patient presents with   • Cough     HYPERTENSION- PT HERE FOR FOLLOW UP U          Vitals:    21 1024   BP: 140/80   Pulse: 65   Resp: 18   Temp: 97.5 °F (36.4 °C)   SpO2: 98%     Body mass index is 30.55 kg/m².  The following portions of the patient's history were reviewed and updated as appropriate: allergies, current medications, past family history, past medical history, past social history, past surgical history and problem list.    Past Medical History:   Diagnosis Date   • Chest pain     possibly due to pericarditis   • Coronary artery disease     mild to moderate   • Heart disease    • History of positive PPD     as a child    • History of rotator cuff tear     LEFT    • Hyperlipidemia    • Hypertension       No Known Allergies   Social History     Socioeconomic History   • Marital status:      Spouse name: Kenyatta Montero   • Number of children: 2   • Years of education: 16   • Highest education level: Bachelor's degree (e.g., BA, AB, BS)   Tobacco Use   • Smoking status: Former Smoker     Packs/day: 1.00     Years: 14.00     Pack years: 14.00     Types: Cigarettes     Start date:      Quit date:      Years since quittin.6   • Smokeless tobacco: Never Used   • Tobacco comment: QUIT AGE 29   Substance and Sexual Activity   • Alcohol use: Yes     Comment: THREE TIMES WEEK, BEER   • Drug use: No   • Sexual activity: Defer        Current Outpatient Medications:   •  aspirin 81 MG EC tablet, Take 81 mg by mouth Every Morning., Disp: , Rfl:   •  B Complex Vitamins (VITAMIN B COMPLEX PO), Take 1 tablet by  mouth Every Morning. ON HOLD, Disp: , Rfl:   •  ezetimibe (Zetia) 10 MG tablet, Take 1 tablet by mouth Daily., Disp: 90 tablet, Rfl: 2  •  folic acid (FOLVITE) 400 MCG tablet, Take 400 mcg by mouth Daily. ON HOLD, Disp: , Rfl:   •  lisinopril (PRINIVIL,ZESTRIL) 5 MG tablet, Take 1 tablet by mouth Daily., Disp: 90 tablet, Rfl: 2  •  metoprolol tartrate (LOPRESSOR) 25 MG tablet, Take 1 tablet by mouth Every 12 (Twelve) Hours. PLEASE SCHEDULE AN APPOINTMENT FOR FURTHER REFILLS., Disp: 180 tablet, Rfl: 3  •  nitroglycerin (NITROSTAT) 0.4 MG SL tablet, 1 under the tongue as needed for angina, may repeat q5mins for up three doses (Patient taking differently: Place 0.4 mg under the tongue Every 5 (Five) Minutes As Needed for Chest Pain. 1 under the tongue as needed for angina, may repeat q5mins for up three doses), Disp: 100 tablet, Rfl: 11  •  simvastatin (ZOCOR) 40 MG tablet, Take 1 tablet by mouth Every Evening., Disp: 90 tablet, Rfl: 1  •  vitamin C (ASCORBIC ACID) 250 MG tablet, Take 250 mg by mouth Every Morning. ON HOLD, Disp: , Rfl:   •  Fluticasone-Umeclidin-Vilant (Trelegy Ellipta) 100-62.5-25 MCG/INH inhaler, Inhale 1 puff Daily., Disp: 1 each, Rfl: 0  No current facility-administered medications for this visit.    Facility-Administered Medications Ordered in Other Visits:   •  Chlorhexidine Gluconate Cloth 2 % pads 1 application, 1 application, Topical, Q12H PRN, Rajwinder Carl, MYLES     Objective     History of Present Illness     Review of Systems   Constitutional: Negative.    HENT: Negative.    Respiratory: Positive for cough.    Cardiovascular: Negative.    Gastrointestinal: Negative.    Genitourinary: Negative.    Musculoskeletal: Negative.    Skin: Negative.    Neurological: Negative.    Hematological: Negative.    Psychiatric/Behavioral: Negative.        Physical Exam  Vitals and nursing note reviewed.   Constitutional:       General: He is not in acute distress.     Appearance: Normal appearance. He is  not ill-appearing.   HENT:      Head: Normocephalic and atraumatic.   Eyes:      General: No scleral icterus.     Conjunctiva/sclera: Conjunctivae normal.   Cardiovascular:      Rate and Rhythm: Normal rate and regular rhythm.      Heart sounds: Normal heart sounds.   Pulmonary:      Effort: Pulmonary effort is normal. No respiratory distress.      Breath sounds: Normal breath sounds. No stridor. No wheezing, rhonchi or rales.   Musculoskeletal:         General: Normal range of motion.      Cervical back: Normal range of motion and neck supple.   Skin:     General: Skin is warm and dry.   Neurological:      General: No focal deficit present.      Mental Status: He is alert and oriented to person, place, and time.   Psychiatric:         Mood and Affect: Mood normal.         Behavior: Behavior normal.         ASSESSMENT the patient's Covid test was negative and his chest x-ray was normal  #1-slightly improved coughing, most likely a bronchitis with some intermittent bronchospasm  #2-I doubt that this is related to his lisinopril     Problems Addressed this Visit        Allergies and Adverse Reactions    Environmental and seasonal allergies - Primary       Cardiac and Vasculature    Essential hypertension    Relevant Medications    lisinopril (PRINIVIL,ZESTRIL) 5 MG tablet      Diagnoses       Codes Comments    Environmental and seasonal allergies    -  Primary ICD-10-CM: J30.89  ICD-9-CM: 477.8     Essential hypertension     ICD-10-CM: I10  ICD-9-CM: 401.9           PLAN I am going to try the patient on a sample of Trelegy Ellipta once a day and would like to recheck him in 2 weeks to see how that is.    There are no Patient Instructions on file for this visit.  Return in about 2 weeks (around 9/9/2021).

## 2021-09-14 ENCOUNTER — OFFICE VISIT (OUTPATIENT)
Dept: FAMILY MEDICINE CLINIC | Facility: CLINIC | Age: 66
End: 2021-09-14

## 2021-09-14 VITALS
WEIGHT: 207.6 LBS | DIASTOLIC BLOOD PRESSURE: 78 MMHG | SYSTOLIC BLOOD PRESSURE: 122 MMHG | HEART RATE: 74 BPM | OXYGEN SATURATION: 98 % | BODY MASS INDEX: 30.75 KG/M2 | TEMPERATURE: 97.1 F | HEIGHT: 69 IN

## 2021-09-14 DIAGNOSIS — I25.10 CORONARY ARTERY DISEASE INVOLVING NATIVE CORONARY ARTERY OF NATIVE HEART WITHOUT ANGINA PECTORIS: ICD-10-CM

## 2021-09-14 DIAGNOSIS — J30.89 ENVIRONMENTAL AND SEASONAL ALLERGIES: Primary | ICD-10-CM

## 2021-09-14 DIAGNOSIS — R05.9 COUGH: ICD-10-CM

## 2021-09-14 DIAGNOSIS — I10 ESSENTIAL HYPERTENSION: ICD-10-CM

## 2021-09-14 PROCEDURE — 99213 OFFICE O/P EST LOW 20 MIN: CPT | Performed by: INTERNAL MEDICINE

## 2021-09-14 NOTE — PROGRESS NOTES
Subjective   Elijah Montero is a 65 y.o. male. Patient is here today for follow-up on his complaints of cough and intermittent shortness of breath.  He has had no fevers chills or pleurisy and is feeling better since I saw him last and thinks the cough is less.  He is here to review his chest x-ray and Covid test results.  Chief Complaint   Patient presents with   • Shortness of Breath     Overall getting better          Vitals:    21 0850   BP: 122/78   Pulse: 74   Temp: 97.1 °F (36.2 °C)   SpO2: 98%     Body mass index is 30.66 kg/m².  The following portions of the patient's history were reviewed and updated as appropriate: allergies, current medications, past family history, past medical history, past social history, past surgical history and problem list.    Past Medical History:   Diagnosis Date   • Chest pain     possibly due to pericarditis   • Coronary artery disease     mild to moderate   • Heart disease    • History of positive PPD     as a child    • History of rotator cuff tear     LEFT    • Hyperlipidemia    • Hypertension       No Known Allergies   Social History     Socioeconomic History   • Marital status:      Spouse name: Kenyatta Montero   • Number of children: 2   • Years of education: 16   • Highest education level: Bachelor's degree (e.g., BA, AB, BS)   Tobacco Use   • Smoking status: Former Smoker     Packs/day: 1.00     Years: 14.00     Pack years: 14.00     Types: Cigarettes     Start date:      Quit date:      Years since quittin.7   • Smokeless tobacco: Never Used   • Tobacco comment: QUIT AGE 29   Substance and Sexual Activity   • Alcohol use: Yes     Comment: THREE TIMES WEEK, BEER   • Drug use: No   • Sexual activity: Defer        Current Outpatient Medications:   •  aspirin 81 MG EC tablet, Take 81 mg by mouth Every Morning., Disp: , Rfl:   •  B Complex Vitamins (VITAMIN B COMPLEX PO), Take 1 tablet by mouth Every Morning. ON HOLD, Disp: , Rfl:   •   ezetimibe (Zetia) 10 MG tablet, Take 1 tablet by mouth Daily., Disp: 90 tablet, Rfl: 2  •  Fluticasone-Umeclidin-Vilant (Trelegy Ellipta) 100-62.5-25 MCG/INH inhaler, Inhale 1 puff Daily., Disp: 1 each, Rfl: 0  •  folic acid (FOLVITE) 400 MCG tablet, Take 400 mcg by mouth Daily. ON HOLD, Disp: , Rfl:   •  lisinopril (PRINIVIL,ZESTRIL) 5 MG tablet, Take 1 tablet by mouth Daily., Disp: 90 tablet, Rfl: 2  •  metoprolol tartrate (LOPRESSOR) 25 MG tablet, Take 1 tablet by mouth Every 12 (Twelve) Hours. PLEASE SCHEDULE AN APPOINTMENT FOR FURTHER REFILLS., Disp: 180 tablet, Rfl: 3  •  nitroglycerin (NITROSTAT) 0.4 MG SL tablet, 1 under the tongue as needed for angina, may repeat q5mins for up three doses (Patient taking differently: Place 0.4 mg under the tongue Every 5 (Five) Minutes As Needed for Chest Pain. 1 under the tongue as needed for angina, may repeat q5mins for up three doses), Disp: 100 tablet, Rfl: 11  •  simvastatin (ZOCOR) 40 MG tablet, Take 1 tablet by mouth Every Evening., Disp: 90 tablet, Rfl: 1  •  vitamin C (ASCORBIC ACID) 250 MG tablet, Take 250 mg by mouth Every Morning. ON HOLD, Disp: , Rfl:   No current facility-administered medications for this visit.    Facility-Administered Medications Ordered in Other Visits:   •  Chlorhexidine Gluconate Cloth 2 % pads 1 application, 1 application, Topical, Q12H PRN, Meseret, Rajwinder, MYLES     Objective     History of Present Illness     Review of Systems   Constitutional: Negative.  Negative for fever.   HENT: Negative.  Negative for ear pain, rhinorrhea and sore throat.    Respiratory: Positive for cough and shortness of breath. Negative for wheezing.    Cardiovascular: Negative.  Negative for chest pain and leg swelling.   Gastrointestinal: Negative.  Negative for abdominal pain and vomiting.   Genitourinary: Negative.    Musculoskeletal: Negative.  Negative for neck pain.   Skin: Negative.  Negative for rash.   Neurological: Negative.  Negative for headaches.    Hematological: Negative.    Psychiatric/Behavioral: Negative.        Physical Exam  Vitals and nursing note reviewed.   Constitutional:       Appearance: Normal appearance.   HENT:      Head: Normocephalic and atraumatic.   Eyes:      General: No scleral icterus.     Conjunctiva/sclera: Conjunctivae normal.   Cardiovascular:      Rate and Rhythm: Normal rate and regular rhythm.      Heart sounds: Normal heart sounds.   Pulmonary:      Effort: Pulmonary effort is normal. No respiratory distress.      Breath sounds: Normal breath sounds. No wheezing or rales.   Musculoskeletal:         General: Normal range of motion.      Cervical back: Normal range of motion and neck supple.   Skin:     General: Skin is warm and dry.   Neurological:      General: No focal deficit present.      Mental Status: He is alert and oriented to person, place, and time.   Psychiatric:         Mood and Affect: Mood normal.         Behavior: Behavior normal.         ASSESSMENT chest x-ray was normal.  Covid test was negative.  #1-shortness of breath and cough, improving gradually.  He probably has had an upper respiratory infection that slowly resolving, most likely viral  #2-hypertension controlled  3-coronary artery disease, asymptomatic     Problems Addressed this Visit        Allergies and Adverse Reactions    Environmental and seasonal allergies - Primary       Cardiac and Vasculature    Essential hypertension    Coronary artery disease involving native coronary artery      Other Visit Diagnoses     Cough          Diagnoses       Codes Comments    Environmental and seasonal allergies    -  Primary ICD-10-CM: J30.89  ICD-9-CM: 477.8     Essential hypertension     ICD-10-CM: I10  ICD-9-CM: 401.9     Coronary artery disease involving native coronary artery of native heart without angina pectoris     ICD-10-CM: I25.10  ICD-9-CM: 414.01     Cough     ICD-10-CM: R05  ICD-9-CM: 786.2           PLAN I recommended a flu shot in October.  Patient  will continue current medicines and is already scheduled for follow-up with labs in October and will keep that appointment    There are no Patient Instructions on file for this visit.  No follow-ups on file.

## 2021-09-16 DIAGNOSIS — R73.03 PREDIABETES: ICD-10-CM

## 2021-09-16 DIAGNOSIS — Z00.00 HEALTH MAINTENANCE EXAMINATION: ICD-10-CM

## 2022-01-12 ENCOUNTER — TELEPHONE (OUTPATIENT)
Dept: FAMILY MEDICINE CLINIC | Facility: CLINIC | Age: 67
End: 2022-01-12

## 2022-01-12 NOTE — TELEPHONE ENCOUNTER
Caller: Elijah Montero    Relationship to patient: Self    Best call back number: [phone]    Date of positive COVID19 test: 01/10/2022    COVID19 symptoms: FEVER, ACHES, COUGH, PAINFUL URINATION, NO ENGERY, SHORTNESS OF BREATHE     Additional information or concerns: PATIENT IS WANTING ANY RECOMMENDATIONS OR MEDICATIONS. PATIENT IS STUCK IN [place] DUE TO TESTING POSIVTIVE AND NOT ABLE TO BOARD PLANE     What is the patients preferred pharmacy:     [facility] at [address] [place] [phone]

## 2022-01-24 DIAGNOSIS — I10 ESSENTIAL HYPERTENSION: ICD-10-CM

## 2022-01-25 RX ORDER — LISINOPRIL 5 MG/1
5 TABLET ORAL DAILY
Qty: 90 TABLET | Refills: 2 | Status: SHIPPED | OUTPATIENT
Start: 2022-01-25 | End: 2022-08-18 | Stop reason: SDUPTHER

## 2022-01-25 RX ORDER — SIMVASTATIN 40 MG
40 TABLET ORAL EVERY EVENING
Qty: 90 TABLET | Refills: 1 | Status: SHIPPED | OUTPATIENT
Start: 2022-01-25 | End: 2022-08-18 | Stop reason: SDUPTHER

## 2022-01-25 RX ORDER — EZETIMIBE 10 MG/1
10 TABLET ORAL DAILY
Qty: 90 TABLET | Refills: 2 | Status: SHIPPED | OUTPATIENT
Start: 2022-01-25 | End: 2022-08-18 | Stop reason: SDUPTHER

## 2022-02-28 DIAGNOSIS — R55 SYNCOPE AND COLLAPSE: Primary | ICD-10-CM

## 2022-03-07 ENCOUNTER — OFFICE VISIT (OUTPATIENT)
Dept: CARDIOLOGY | Facility: CLINIC | Age: 67
End: 2022-03-07

## 2022-03-07 ENCOUNTER — HOSPITAL ENCOUNTER (OUTPATIENT)
Dept: CARDIOLOGY | Facility: HOSPITAL | Age: 67
Discharge: HOME | End: 2022-03-07
Admitting: INTERNAL MEDICINE

## 2022-03-07 VITALS
DIASTOLIC BLOOD PRESSURE: 76 MMHG | SYSTOLIC BLOOD PRESSURE: 124 MMHG | BODY MASS INDEX: 30.66 KG/M2 | HEART RATE: 58 BPM | WEIGHT: 207 LBS | HEIGHT: 69 IN

## 2022-03-07 VITALS
DIASTOLIC BLOOD PRESSURE: 77 MMHG | HEIGHT: 69 IN | HEART RATE: 56 BPM | BODY MASS INDEX: 30.81 KG/M2 | WEIGHT: 208 LBS | SYSTOLIC BLOOD PRESSURE: 127 MMHG

## 2022-03-07 DIAGNOSIS — R06.02 SOB (SHORTNESS OF BREATH): Primary | ICD-10-CM

## 2022-03-07 DIAGNOSIS — I25.118 CORONARY ARTERY DISEASE OF NATIVE ARTERY OF NATIVE HEART WITH STABLE ANGINA PECTORIS: ICD-10-CM

## 2022-03-07 DIAGNOSIS — I10 ESSENTIAL HYPERTENSION: ICD-10-CM

## 2022-03-07 DIAGNOSIS — R07.2 PRECORDIAL PAIN: ICD-10-CM

## 2022-03-07 LAB
BH CV ECHO MEAS - ACS: 2.28 CM
BH CV ECHO MEAS - AO MAX PG: 13 MMHG
BH CV ECHO MEAS - AO MEAN PG: 8.1 MMHG
BH CV ECHO MEAS - AO ROOT DIAM: 3.1 CM
BH CV ECHO MEAS - AO V2 MAX: 180.5 CM/SEC
BH CV ECHO MEAS - AO V2 VTI: 48.1 CM
BH CV ECHO MEAS - AVA(I,D): 1.3 CM2
BH CV ECHO MEAS - EDV(CUBED): 71.7 ML
BH CV ECHO MEAS - EDV(MOD-SP2): 43 ML
BH CV ECHO MEAS - EDV(MOD-SP4): 48 ML
BH CV ECHO MEAS - EF(MOD-BP): 57.5 %
BH CV ECHO MEAS - EF(MOD-SP2): 51.2 %
BH CV ECHO MEAS - EF(MOD-SP4): 58.3 %
BH CV ECHO MEAS - ESV(CUBED): 22.7 ML
BH CV ECHO MEAS - ESV(MOD-SP2): 21 ML
BH CV ECHO MEAS - ESV(MOD-SP4): 20 ML
BH CV ECHO MEAS - FS: 31.9 %
BH CV ECHO MEAS - IVS/LVPW: 0.74 CM
BH CV ECHO MEAS - IVSD: 1.1 CM
BH CV ECHO MEAS - LA DIMENSION: 3.5 CM
BH CV ECHO MEAS - LAT PEAK E' VEL: 8.7 CM/SEC
BH CV ECHO MEAS - LV DIASTOLIC VOL/BSA (35-75): 22.9 CM2
BH CV ECHO MEAS - LV MASS(C)D: 195 GRAMS
BH CV ECHO MEAS - LV MAX PG: 2.41 MMHG
BH CV ECHO MEAS - LV MEAN PG: 1.29 MMHG
BH CV ECHO MEAS - LV SYSTOLIC VOL/BSA (12-30): 9.5 CM2
BH CV ECHO MEAS - LV V1 MAX: 77.6 CM/SEC
BH CV ECHO MEAS - LV V1 VTI: 18 CM
BH CV ECHO MEAS - LVIDD: 4.2 CM
BH CV ECHO MEAS - LVIDS: 2.8 CM
BH CV ECHO MEAS - LVOT AREA: 3.5 CM2
BH CV ECHO MEAS - LVOT DIAM: 2.1 CM
BH CV ECHO MEAS - LVPWD: 1.48 CM
BH CV ECHO MEAS - MED PEAK E' VEL: 6.9 CM/SEC
BH CV ECHO MEAS - MV A DUR: 0.12 SEC
BH CV ECHO MEAS - MV A MAX VEL: 76.6 CM/SEC
BH CV ECHO MEAS - MV DEC SLOPE: 148.1 CM/SEC2
BH CV ECHO MEAS - MV DEC TIME: 211 MSEC
BH CV ECHO MEAS - MV E MAX VEL: 77.1 CM/SEC
BH CV ECHO MEAS - MV E/A: 1.01
BH CV ECHO MEAS - MV MAX PG: 2.39 MMHG
BH CV ECHO MEAS - MV MEAN PG: 1.1 MMHG
BH CV ECHO MEAS - MV V2 VTI: 30.6 CM
BH CV ECHO MEAS - MVA(VTI): 2.04 CM2
BH CV ECHO MEAS - PA ACC TIME: 0.15 SEC
BH CV ECHO MEAS - PA PR(ACCEL): 10.1 MMHG
BH CV ECHO MEAS - PULM A REVS DUR: 0.18 SEC
BH CV ECHO MEAS - PULM A REVS VEL: 24.3 CM/SEC
BH CV ECHO MEAS - PULM DIAS VEL: 36.3 CM/SEC
BH CV ECHO MEAS - PULM SYS VEL: 45.9 CM/SEC
BH CV ECHO MEAS - RAP SYSTOLE: 3 MMHG
BH CV ECHO MEAS - RV MAX PG: 0.57 MMHG
BH CV ECHO MEAS - RV V1 MAX: 37.8 CM/SEC
BH CV ECHO MEAS - RV V1 VTI: 10.1 CM
BH CV ECHO MEAS - RVDD: 2.5 CM
BH CV ECHO MEAS - RVSP: 24 MMHG
BH CV ECHO MEAS - SI(MOD-SP2): 10.5 ML/M2
BH CV ECHO MEAS - SI(MOD-SP4): 13.4 ML/M2
BH CV ECHO MEAS - SV(LVOT): 62.3 ML
BH CV ECHO MEAS - SV(MOD-SP2): 22 ML
BH CV ECHO MEAS - SV(MOD-SP4): 28 ML
BH CV ECHO MEAS - TAPSE (>1.6): 1.51 CM
BH CV ECHO MEAS - TR MAX PG: 21 MMHG
BH CV ECHO MEAS - TR MAX VEL: 229.4 CM/SEC
BH CV ECHO MEASUREMENTS AVERAGE E/E' RATIO: 9.88
BH CV XLRA - RV BASE: 3.4 CM
BH CV XLRA - RV LENGTH: 6.8 CM
BH CV XLRA - RV MID: 3.3 CM
BH CV XLRA - TDI S': 9.5 CM/SEC
LEFT ATRIUM VOLUME INDEX: 21.5 ML/M2
MAXIMAL PREDICTED HEART RATE: 156 BPM
SINUS: 2.6 CM
STJ: 2.7 CM
STRESS TARGET HR: 133 BPM

## 2022-03-07 PROCEDURE — 93306 TTE W/DOPPLER COMPLETE: CPT

## 2022-03-07 PROCEDURE — 93000 ELECTROCARDIOGRAM COMPLETE: CPT | Performed by: INTERNAL MEDICINE

## 2022-03-07 PROCEDURE — 99213 OFFICE O/P EST LOW 20 MIN: CPT | Performed by: INTERNAL MEDICINE

## 2022-03-07 PROCEDURE — 93306 TTE W/DOPPLER COMPLETE: CPT | Performed by: INTERNAL MEDICINE

## 2022-03-07 NOTE — PROGRESS NOTES
ECHO AND FOLLOW UP   Subjective:        Elijah Montero is a 66 y.o. male who here for follow up    CC  Follow-up coronary artery disease hypertension  HPI  66-year-old male with known history of coronary artery disease, benign essential arterial hypertension and shortness of breath here for the follow-up with no complaints of chest pains tightness heaviness or the pressure sensation     Problems Addressed this Visit        Cardiac and Vasculature    Essential hypertension    Coronary artery disease of native artery of native heart with stable angina pectoris (HCC)    Precordial pain       Pulmonary and Pneumonias    SOB (shortness of breath) - Primary    Relevant Orders    ECG 12 Lead      Diagnoses       Codes Comments    SOB (shortness of breath)    -  Primary ICD-10-CM: R06.02  ICD-9-CM: 786.05     Coronary artery disease of native artery of native heart with stable angina pectoris (HCC)     ICD-10-CM: I25.118  ICD-9-CM: 414.01, 413.9     Precordial pain     ICD-10-CM: R07.2  ICD-9-CM: 786.51     Essential hypertension     ICD-10-CM: I10  ICD-9-CM: 401.9         .    The following portions of the patient's history were reviewed and updated as appropriate: allergies, current medications, past family history, past medical history, past social history, past surgical history and problem list.    Past Medical History:   Diagnosis Date   • Chest pain     possibly due to pericarditis   • Coronary artery disease     mild to moderate   • Heart disease    • History of positive PPD     as a child    • History of rotator cuff tear     LEFT 1988   • Hyperlipidemia    • Hypertension      reports that he quit smoking about 38 years ago. His smoking use included cigarettes. He started smoking about 52 years ago. He has a 14.00 pack-year smoking history. He has never used smokeless tobacco. He reports current alcohol use. He reports that he does not use drugs.   Family History   Problem Relation Age of Onset   • Other Mother 65  "       cabg   • Heart disease Mother    • Heart disease Father    • Other Father 45        cabg   • Other Sister         BLOOD DISEASE    • Malig Hyperthermia Neg Hx        Review of Systems  Constitutional: No wt loss, fever, fatigue  Gastrointestinal: No nausea, abdominal pain  Behavioral/Psych: No insomnia or anxiety   Cardiovascular no chest pains or tightness in the chest  Objective:       Physical Exam  /77   Pulse 56   Ht 175.3 cm (69\")   Wt 94.3 kg (208 lb)   BMI 30.72 kg/m²   General appearance: No acute changes   Neck: Trachea midline; NECK, supple, no thyromegaly or lymphadenopathy   Lungs: Normal size and shape, normal breath sounds, equal distribution of air, no rales and rhonchi   CV: S1-S2 regular, no murmurs, no rub, no gallop   Abdomen: Soft, nontender; no masses , no abnormal abdominal sounds   Extremities: No deformity , normal color , no peripheral edema   Skin: Normal temperature, turgor and texture; no rash, ulcers            ECG 12 Lead    Date/Time: 3/7/2022 10:09 AM  Performed by: Guera Perry MD  Authorized by: Guera Perry MD   Comparison: compared with previous ECG   Similar to previous ECG  Rhythm: sinus rhythm    Clinical impression: non-specific ECG              Echocardiogram:        Current Outpatient Medications:   •  aspirin 81 MG EC tablet, Take 81 mg by mouth Every Morning., Disp: , Rfl:   •  B Complex Vitamins (VITAMIN B COMPLEX PO), Take 1 tablet by mouth Every Morning. ON HOLD, Disp: , Rfl:   •  ezetimibe (Zetia) 10 MG tablet, Take 1 tablet by mouth Daily., Disp: 90 tablet, Rfl: 2  •  Fluticasone-Umeclidin-Vilant (Trelegy Ellipta) 100-62.5-25 MCG/INH inhaler, Inhale 1 puff Daily., Disp: 1 each, Rfl: 0  •  folic acid (FOLVITE) 400 MCG tablet, Take 400 mcg by mouth Daily. ON HOLD, Disp: , Rfl:   •  lisinopril (PRINIVIL,ZESTRIL) 5 MG tablet, Take 1 tablet by mouth Daily., Disp: 90 tablet, Rfl: 2  •  metoprolol tartrate (LOPRESSOR) 25 MG tablet, Take " 1 tablet by mouth Every 12 (Twelve) Hours. PLEASE SCHEDULE AN APPOINTMENT FOR FURTHER REFILLS., Disp: 180 tablet, Rfl: 3  •  simvastatin (ZOCOR) 40 MG tablet, Take 1 tablet by mouth Every Evening., Disp: 90 tablet, Rfl: 1  •  vitamin C (ASCORBIC ACID) 250 MG tablet, Take 250 mg by mouth Every Morning. ON HOLD, Disp: , Rfl:   •  nitroglycerin (NITROSTAT) 0.4 MG SL tablet, 1 under the tongue as needed for angina, may repeat q5mins for up three doses (Patient taking differently: Place 0.4 mg under the tongue Every 5 (Five) Minutes As Needed for Chest Pain. 1 under the tongue as needed for angina, may repeat q5mins for up three doses), Disp: 100 tablet, Rfl: 11  No current facility-administered medications for this visit.    Facility-Administered Medications Ordered in Other Visits:   •  Chlorhexidine Gluconate Cloth 2 % pads 1 application, 1 application, Topical, Q12H RIVERN, Rajwinder Carl APRN   Assessment:        Patient Active Problem List   Diagnosis   • Heart disease   • Hyperlipidemia   • Essential hypertension   • History of positive PPD   • Prediabetes   • Obesity (BMI 30-39.9)   • Coronary artery disease of native artery of native heart with stable angina pectoris (HCC)   • Precordial pain   • Abnormal cardiac function test   • Coronary artery disease involving native coronary artery   • Shortness of breath   • Precordial chest pain   • Positive colorectal cancer screening using Cologuard test   • Environmental and seasonal allergies               Plan:            ICD-10-CM ICD-9-CM   1. SOB (shortness of breath)  R06.02 786.05   2. Coronary artery disease of native artery of native heart with stable angina pectoris (HCC)  I25.118 414.01     413.9   3. Precordial pain  R07.2 786.51   4. Essential hypertension  I10 401.9     1. SOB (shortness of breath)  Multifactorial  - ECG 12 Lead    2. Coronary artery disease of native artery of native heart with stable angina pectoris (HCC)  No angina pectoris    3.  Precordial pain  Atypical chest pain    4. Essential hypertension  Blood pressure under control       Sob better  See in 1 yr  COUNSELING:    Elijah Gary was given to patient for the following topics: diagnostic results, risk factor reductions, impressions, risks and benefits of treatment options and importance of treatment compliance .       SMOKING COUNSELING:    [unfilled]    Dictated using Dragon dictation

## 2022-06-13 NOTE — TELEPHONE ENCOUNTER
Caller: [facility] [address] - [phone] [facility] [phone] FX    Relationship: Pharmacy    Best call back number: [phone]    Requested Prescriptions:   Requested Prescriptions     Pending Prescriptions Disp Refills   • nitroglycerin (NITROSTAT) 0.4 MG SL tablet 100 tablet 11     Si under the tongue as needed for angina, may repeat q5mins for up three doses        Pharmacy where request should be sent: [facility] [address] [facility] - [phone] [facility] [phone] FX     Additional details provided by patient: PATIENT IS ALMOST OUT OF THIS MEDICATION.    Does the patient have less than a 3 day supply:  [x] Yes  [] No    Camilo Sparrow Rep   22 10:58 EDT

## 2022-06-17 RX ORDER — NITROGLYCERIN 0.4 MG/1
TABLET SUBLINGUAL
Qty: 100 TABLET | Refills: 11 | Status: SHIPPED | OUTPATIENT
Start: 2022-06-17

## 2022-08-11 DIAGNOSIS — Z00.00 ROUTINE GENERAL MEDICAL EXAMINATION AT A HEALTH CARE FACILITY: ICD-10-CM

## 2022-08-11 DIAGNOSIS — R73.03 PREDIABETES: ICD-10-CM

## 2022-08-11 DIAGNOSIS — E78.5 HYPERLIPIDEMIA, UNSPECIFIED HYPERLIPIDEMIA TYPE: Primary | ICD-10-CM

## 2022-08-12 LAB
ALBUMIN SERPL-MCNC: 4.8 G/DL (ref 3.8–4.8)
ALBUMIN/GLOB SERPL: 2 {RATIO} (ref 1.2–2.2)
ALP SERPL-CCNC: 69 IU/L (ref 44–121)
ALT SERPL-CCNC: 28 IU/L (ref 0–44)
APPEARANCE UR: CLEAR
AST SERPL-CCNC: 21 IU/L (ref 0–40)
BACTERIA #/AREA URNS HPF: NORMAL /[HPF]
BASOPHILS # BLD AUTO: 0 X10E3/UL (ref 0–0.2)
BASOPHILS NFR BLD AUTO: 1 %
BILIRUB SERPL-MCNC: 0.5 MG/DL (ref 0–1.2)
BILIRUB UR QL STRIP: NEGATIVE
BUN SERPL-MCNC: 18 MG/DL (ref 8–27)
BUN/CREAT SERPL: 18 (ref 10–24)
CALCIUM SERPL-MCNC: 9.4 MG/DL (ref 8.6–10.2)
CASTS URNS QL MICRO: NORMAL /LPF
CHLORIDE SERPL-SCNC: 100 MMOL/L (ref 96–106)
CHOLEST SERPL-MCNC: 127 MG/DL (ref 100–199)
CO2 SERPL-SCNC: 21 MMOL/L (ref 20–29)
COLOR UR: YELLOW
CREAT SERPL-MCNC: 1 MG/DL (ref 0.76–1.27)
EGFRCR SERPLBLD CKD-EPI 2021: 84 ML/MIN/1.73
EOSINOPHIL # BLD AUTO: 0.2 X10E3/UL (ref 0–0.4)
EOSINOPHIL NFR BLD AUTO: 4 %
EPI CELLS #/AREA URNS HPF: NORMAL /HPF (ref 0–10)
ERYTHROCYTE [DISTWIDTH] IN BLOOD BY AUTOMATED COUNT: 12.5 % (ref 11.6–15.4)
GLOBULIN SER CALC-MCNC: 2.4 G/DL (ref 1.5–4.5)
GLUCOSE SERPL-MCNC: 110 MG/DL (ref 65–99)
GLUCOSE UR QL STRIP: NEGATIVE
HBA1C MFR BLD: 6.5 % (ref 4.8–5.6)
HCT VFR BLD AUTO: 45.5 % (ref 37.5–51)
HDLC SERPL-MCNC: 51 MG/DL
HGB BLD-MCNC: 15.3 G/DL (ref 13–17.7)
HGB UR QL STRIP: ABNORMAL
IMM GRANULOCYTES # BLD AUTO: 0 X10E3/UL (ref 0–0.1)
IMM GRANULOCYTES NFR BLD AUTO: 0 %
KETONES UR QL STRIP: NEGATIVE
LDLC SERPL CALC-MCNC: 55 MG/DL (ref 0–99)
LDLC/HDLC SERPL: 1.1 RATIO (ref 0–3.6)
LEUKOCYTE ESTERASE UR QL STRIP: NEGATIVE
LYMPHOCYTES # BLD AUTO: 1.4 X10E3/UL (ref 0.7–3.1)
LYMPHOCYTES NFR BLD AUTO: 21 %
MCH RBC QN AUTO: 31.8 PG (ref 26.6–33)
MCHC RBC AUTO-ENTMCNC: 33.6 G/DL (ref 31.5–35.7)
MCV RBC AUTO: 95 FL (ref 79–97)
MICRO URNS: ABNORMAL
MONOCYTES # BLD AUTO: 0.9 X10E3/UL (ref 0.1–0.9)
MONOCYTES NFR BLD AUTO: 13 %
NEUTROPHILS # BLD AUTO: 4.2 X10E3/UL (ref 1.4–7)
NEUTROPHILS NFR BLD AUTO: 61 %
NITRITE UR QL STRIP: NEGATIVE
PH UR STRIP: 5.5 [PH] (ref 5–7.5)
PLATELET # BLD AUTO: 188 X10E3/UL (ref 150–450)
POTASSIUM SERPL-SCNC: 4.2 MMOL/L (ref 3.5–5.2)
PROT SERPL-MCNC: 7.2 G/DL (ref 6–8.5)
PROT UR QL STRIP: NEGATIVE
RBC # BLD AUTO: 4.81 X10E6/UL (ref 4.14–5.8)
RBC #/AREA URNS HPF: NORMAL /HPF (ref 0–2)
SODIUM SERPL-SCNC: 137 MMOL/L (ref 134–144)
SP GR UR STRIP: 1.02 (ref 1–1.03)
TRIGL SERPL-MCNC: 115 MG/DL (ref 0–149)
TSH SERPL DL<=0.005 MIU/L-ACNC: 2.91 UIU/ML (ref 0.45–4.5)
UROBILINOGEN UR STRIP-MCNC: 0.2 MG/DL (ref 0.2–1)
VLDLC SERPL CALC-MCNC: 21 MG/DL (ref 5–40)
WBC # BLD AUTO: 6.9 X10E3/UL (ref 3.4–10.8)
WBC #/AREA URNS HPF: NORMAL /HPF (ref 0–5)

## 2022-08-18 ENCOUNTER — OFFICE VISIT (OUTPATIENT)
Dept: FAMILY MEDICINE CLINIC | Facility: CLINIC | Age: 67
End: 2022-08-18

## 2022-08-18 VITALS
SYSTOLIC BLOOD PRESSURE: 130 MMHG | BODY MASS INDEX: 29.47 KG/M2 | WEIGHT: 199 LBS | OXYGEN SATURATION: 99 % | HEART RATE: 72 BPM | DIASTOLIC BLOOD PRESSURE: 75 MMHG | HEIGHT: 69 IN | TEMPERATURE: 98 F

## 2022-08-18 DIAGNOSIS — Z12.5 SCREENING PSA (PROSTATE SPECIFIC ANTIGEN): ICD-10-CM

## 2022-08-18 DIAGNOSIS — I10 ESSENTIAL HYPERTENSION: ICD-10-CM

## 2022-08-18 DIAGNOSIS — E78.5 HYPERLIPIDEMIA, UNSPECIFIED HYPERLIPIDEMIA TYPE: ICD-10-CM

## 2022-08-18 DIAGNOSIS — J30.89 ENVIRONMENTAL AND SEASONAL ALLERGIES: ICD-10-CM

## 2022-08-18 DIAGNOSIS — R73.03 PREDIABETES: ICD-10-CM

## 2022-08-18 DIAGNOSIS — Z00.00 HEALTH MAINTENANCE EXAMINATION: Primary | ICD-10-CM

## 2022-08-18 DIAGNOSIS — I25.10 CORONARY ARTERY DISEASE INVOLVING NATIVE CORONARY ARTERY OF NATIVE HEART WITHOUT ANGINA PECTORIS: ICD-10-CM

## 2022-08-18 PROBLEM — R19.5 POSITIVE COLORECTAL CANCER SCREENING USING COLOGUARD TEST: Status: RESOLVED | Noted: 2020-02-25 | Resolved: 2022-08-18

## 2022-08-18 PROBLEM — R06.02 SOB (SHORTNESS OF BREATH): Status: RESOLVED | Noted: 2019-08-15 | Resolved: 2022-08-18

## 2022-08-18 PROBLEM — E66.9 OBESITY (BMI 30-39.9): Status: RESOLVED | Noted: 2019-04-03 | Resolved: 2022-08-18

## 2022-08-18 PROBLEM — I25.118 CORONARY ARTERY DISEASE OF NATIVE ARTERY OF NATIVE HEART WITH STABLE ANGINA PECTORIS (HCC): Status: RESOLVED | Noted: 2019-05-23 | Resolved: 2022-08-18

## 2022-08-18 PROCEDURE — 99396 PREV VISIT EST AGE 40-64: CPT | Performed by: INTERNAL MEDICINE

## 2022-08-18 RX ORDER — EZETIMIBE 10 MG/1
10 TABLET ORAL DAILY
Qty: 90 TABLET | Refills: 3 | Status: SHIPPED | OUTPATIENT
Start: 2022-08-18

## 2022-08-18 RX ORDER — LISINOPRIL 5 MG/1
5 TABLET ORAL DAILY
Qty: 90 TABLET | Refills: 3 | Status: SHIPPED | OUTPATIENT
Start: 2022-08-18

## 2022-08-18 RX ORDER — SIMVASTATIN 40 MG
40 TABLET ORAL EVERY EVENING
Qty: 90 TABLET | Refills: 3 | Status: SHIPPED | OUTPATIENT
Start: 2022-08-18

## 2022-08-18 NOTE — PROGRESS NOTES
Subjective   Elijah Montero is a 66 y.o. male. Patient is here today for a complete physical exam.  He feels well and has no acute complaints.  PMH-history of four-vessel CABG and 2019, hypertension, hyperlipidemia and hyperglycemia.  He is also had bilateral hernia repair as a child.  No other operations or hospitalizations  SH-the patient is , sexually active.  He has regular dental and vision checks.  His last colonoscopy was in  by Dr. Keith Joseph.  He does not use any tobacco products and has about 4-6 beers per week.  He exercises 5 to 6 days a week on the treadmill  FH-patient's father  in his early 60s of a CVA and had a history of cardiac bypass.  Patient's mother is 91 with history of bypass surgery and also has dementia    Chief Complaint   Patient presents with   • Annual Exam     PHYSICAL          Vitals:    22 1051   BP: 142/81   Pulse: 72   Temp: 98 °F (36.7 °C)   SpO2: 99%     Body mass index is 29.37 kg/m².  The following portions of the patient's history were reviewed and updated as appropriate: allergies, current medications, past family history, past medical history, past social history, past surgical history and problem list.    Past Medical History:   Diagnosis Date   • Chest pain     possibly due to pericarditis   • Coronary artery disease     mild to moderate   • Heart disease    • History of positive PPD     as a child    • History of rotator cuff tear     LEFT    • Hyperlipidemia    • Hypertension       No Known Allergies   Social History     Socioeconomic History   • Marital status:      Spouse name: Kenyatta Montero   • Number of children: 2   • Years of education: 16   • Highest education level: Bachelor's degree (e.g., BA, AB, BS)   Tobacco Use   • Smoking status: Former Smoker     Packs/day: 1.00     Years: 14.00     Pack years: 14.00     Types: Cigarettes     Start date:      Quit date:      Years since quittin.6   • Smokeless  tobacco: Never Used   • Tobacco comment: QUIT AGE 29   Vaping Use   • Vaping Use: Never used   Substance and Sexual Activity   • Alcohol use: Yes     Comment: THREE TIMES WEEK, BEER   • Drug use: No   • Sexual activity: Defer        Current Outpatient Medications:   •  aspirin 81 MG EC tablet, Take 81 mg by mouth Every Morning., Disp: , Rfl:   •  B Complex Vitamins (VITAMIN B COMPLEX PO), Take 1 tablet by mouth Every Morning. ON HOLD, Disp: , Rfl:   •  ezetimibe (Zetia) 10 MG tablet, Take 1 tablet by mouth Daily., Disp: 90 tablet, Rfl: 3  •  folic acid (FOLVITE) 400 MCG tablet, Take 400 mcg by mouth Daily. ON HOLD, Disp: , Rfl:   •  lisinopril (PRINIVIL,ZESTRIL) 5 MG tablet, Take 1 tablet by mouth Daily., Disp: 90 tablet, Rfl: 3  •  metoprolol tartrate (LOPRESSOR) 25 MG tablet, Take 1 tablet by mouth Every 12 (Twelve) Hours. PLEASE SCHEDULE AN APPOINTMENT FOR FURTHER REFILLS., Disp: 180 tablet, Rfl: 3  •  nitroglycerin (NITROSTAT) 0.4 MG SL tablet, 1 under the tongue as needed for angina, may repeat q5mins for up three doses, Disp: 100 tablet, Rfl: 11  •  simvastatin (ZOCOR) 40 MG tablet, Take 1 tablet by mouth Every Evening., Disp: 90 tablet, Rfl: 3  •  vitamin C (ASCORBIC ACID) 250 MG tablet, Take 250 mg by mouth Every Morning. ON HOLD, Disp: , Rfl:   •  Fluticasone-Umeclidin-Vilant (Trelegy Ellipta) 100-62.5-25 MCG/INH inhaler, Inhale 1 puff Daily., Disp: 1 each, Rfl: 0  No current facility-administered medications for this visit.    Facility-Administered Medications Ordered in Other Visits:   •  Chlorhexidine Gluconate Cloth 2 % pads 1 application, 1 application, Topical, Q12H PRN, Rajwinder Carl APRN     Objective     History of Present Illness     Review of Systems    Physical Exam  Vitals and nursing note reviewed.   Constitutional:       General: He is not in acute distress.     Appearance: Normal appearance. He is not ill-appearing.   HENT:      Head: Normocephalic and atraumatic.   Eyes:      General: No  scleral icterus.        Right eye: No discharge.         Left eye: No discharge.      Extraocular Movements: Extraocular movements intact.      Conjunctiva/sclera: Conjunctivae normal.      Pupils: Pupils are equal, round, and reactive to light.   Neck:      Vascular: No carotid bruit.   Cardiovascular:      Rate and Rhythm: Normal rate and regular rhythm.      Heart sounds: Normal heart sounds. No murmur heard.    No friction rub. No gallop.   Pulmonary:      Effort: Pulmonary effort is normal. No respiratory distress.      Breath sounds: Normal breath sounds. No stridor. No wheezing, rhonchi or rales.   Chest:      Chest wall: No tenderness.   Abdominal:      General: Abdomen is flat. Bowel sounds are normal. There is no distension.      Palpations: Abdomen is soft. There is no mass.      Tenderness: There is no abdominal tenderness. There is no right CVA tenderness, left CVA tenderness, guarding or rebound.      Hernia: No hernia is present.   Musculoskeletal:         General: Normal range of motion.      Cervical back: Normal range of motion and neck supple. No rigidity or tenderness.      Right lower leg: No edema.      Left lower leg: No edema.   Lymphadenopathy:      Cervical: No cervical adenopathy.   Skin:     General: Skin is warm and dry.   Neurological:      General: No focal deficit present.      Mental Status: He is alert and oriented to person, place, and time.   Psychiatric:         Mood and Affect: Mood normal.         Behavior: Behavior normal.         Thought Content: Thought content normal.         Judgment: Judgment normal.         ASSESSMENT CBC is completely normal.  CMP had a sugar of 110 and was otherwise normal.  Lipid panel is total cholesterol 127, HDL 51, LDL 55.  Hemoglobin A1c is slightly increased at 6.5.  TSH was quite normal.  Urinalysis was clear.  1-coronary artery disease, asymptomatic and stable  #2-hypertension controlled on medication  #3-hyperlipidemia controlled on  medication  #4-hyperglycemia with acceptable hemoglobin A1c, diet controlled       Problems Addressed this Visit        Allergies and Adverse Reactions    Environmental and seasonal allergies       Cardiac and Vasculature    Hyperlipidemia    Relevant Medications    simvastatin (ZOCOR) 40 MG tablet    ezetimibe (Zetia) 10 MG tablet    Essential hypertension    Relevant Medications    lisinopril (PRINIVIL,ZESTRIL) 5 MG tablet    Coronary artery disease involving native coronary artery       Endocrine and Metabolic    Prediabetes      Other Visit Diagnoses     Health maintenance examination    -  Primary      Diagnoses       Codes Comments    Health maintenance examination    -  Primary ICD-10-CM: Z00.00  ICD-9-CM: V70.0     Essential hypertension     ICD-10-CM: I10  ICD-9-CM: 401.9     Environmental and seasonal allergies     ICD-10-CM: J30.89  ICD-9-CM: 477.8     Coronary artery disease involving native coronary artery of native heart without angina pectoris     ICD-10-CM: I25.10  ICD-9-CM: 414.01     Hyperlipidemia, unspecified hyperlipidemia type     ICD-10-CM: E78.5  ICD-9-CM: 272.4     Prediabetes     ICD-10-CM: R73.03  ICD-9-CM: 790.29           PLAN the patient will continue current medicines as now.  I did recommend he get a third COVID-19 vaccination and consider the shingles immunizations and get a flu shot in October.  The patient is going to really watch carbs and sweets in his diet, continue his regular exercise and we will recheck him in about 3 to 4 months to see how his sugars are doing and hold off on medications currently.    There are no Patient Instructions on file for this visit.  Return in about 4 months (around 12/18/2022) for with labs.

## 2022-09-25 NOTE — PROGRESS NOTES
CARDIAC/PULMONARY REHAB NUTRITION EDUCATION/ASSESSMENT                    EDUCATION: Attended Heart and Diet education class. We reviewed a brief history of diet relative to cardiovascular health. We dicussed guidelines established by the American Heart AssociationThe Dash diet and the Mediterranean diet. We reviewed the merits of a plant based diet. Heart Healthy shopping with product evaluation was included in the education session. Supportive written information was provided.                5:31 PM  10/9/2019  Maki Umaña RD                        
Tolerated exercise well, no complaints voiced.   
NEGATIVE

## 2023-01-04 ENCOUNTER — OFFICE VISIT (OUTPATIENT)
Dept: FAMILY MEDICINE CLINIC | Facility: CLINIC | Age: 68
End: 2023-01-04
Payer: MEDICARE

## 2023-01-04 VITALS
SYSTOLIC BLOOD PRESSURE: 120 MMHG | BODY MASS INDEX: 28.85 KG/M2 | RESPIRATION RATE: 18 BRPM | WEIGHT: 194.8 LBS | HEART RATE: 61 BPM | TEMPERATURE: 97.8 F | DIASTOLIC BLOOD PRESSURE: 78 MMHG | HEIGHT: 69 IN | OXYGEN SATURATION: 97 %

## 2023-01-04 DIAGNOSIS — R73.03 PREDIABETES: ICD-10-CM

## 2023-01-04 DIAGNOSIS — I25.10 CORONARY ARTERY DISEASE INVOLVING NATIVE CORONARY ARTERY OF NATIVE HEART WITHOUT ANGINA PECTORIS: Primary | ICD-10-CM

## 2023-01-04 DIAGNOSIS — J30.89 ENVIRONMENTAL AND SEASONAL ALLERGIES: ICD-10-CM

## 2023-01-04 DIAGNOSIS — I10 ESSENTIAL HYPERTENSION: ICD-10-CM

## 2023-01-04 DIAGNOSIS — E78.5 HYPERLIPIDEMIA, UNSPECIFIED HYPERLIPIDEMIA TYPE: ICD-10-CM

## 2023-01-04 PROCEDURE — G0009 ADMIN PNEUMOCOCCAL VACCINE: HCPCS | Performed by: INTERNAL MEDICINE

## 2023-01-04 PROCEDURE — 99214 OFFICE O/P EST MOD 30 MIN: CPT | Performed by: INTERNAL MEDICINE

## 2023-01-04 PROCEDURE — 1159F MED LIST DOCD IN RCRD: CPT | Performed by: INTERNAL MEDICINE

## 2023-01-04 PROCEDURE — 1160F RVW MEDS BY RX/DR IN RCRD: CPT | Performed by: INTERNAL MEDICINE

## 2023-01-04 PROCEDURE — 90677 PCV20 VACCINE IM: CPT | Performed by: INTERNAL MEDICINE

## 2023-01-04 NOTE — PROGRESS NOTES
Subjective   Sharath Montero is a 67 y.o. male. Patient is here today for follow-up on his environmental and seasonal allergies, coronary artery disease, hypertension, hyperlipidemia, hyperglycemia.  He is generally feeling well.  He did have a fall from a ladder earlier and had to have surgery on a broken left wrist but that seems to be recovering well.    Chief Complaint   Patient presents with   • Hypertension     HYPERLIPIDEMIA, PREDIABETES- PT HERE FOR FOLLOW UP ON LABS          Vitals:    01/04/23 0847   BP: 120/78   Pulse: 61   Resp: 18   Temp: 97.8 °F (36.6 °C)   SpO2: 97%     Body mass index is 28.75 kg/m².  The following portions of the patient's history were reviewed and updated as appropriate: allergies, current medications, past family history, past medical history, past social history, past surgical history and problem list.    Past Medical History:   Diagnosis Date   • ADHD (attention deficit hyperactivity disorder) NO   • Allergic NO   • Anemia NO   • Anxiety NO   • Arthritis NO   • Asthma NO   • Benign prostatic hyperplasia NO   • Cancer ([facility]) NO   • Cataract NO   • Chest pain     possibly due to pericarditis   • CHF (congestive heart failure) ([facility]) NO   • Cholelithiasis NO   • Clotting disorder ([facility]) NO   • Colon polyp NO   • COPD (chronic obstructive pulmonary disease) ([facility]) NO   • Coronary artery disease     mild to moderate   • Deep vein thrombosis ([facility]) NO   • Depression NO   • Diabetes mellitus ([facility]) NO   • Diverticulosis NO   • Eating disorder NO   • Erectile dysfunction NO   • Fibromyalgia, primary NO   • GERD (gastroesophageal reflux disease) NO   • Glaucoma NO   • Headache NO   • Heart disease    • Heart murmur NO   • History of positive PPD     as a child    • History of rotator cuff tear     LEFT 1988   • HIV disease ([facility]) NO   • HL (hearing loss) NO   • Hyperlipidemia    • Hypertension    • Infectious viral hepatitis NO      No Known Allergies   Social History     Socioeconomic  History   • Marital status:      Spouse name: Kenyatta Montero   • Number of children: 2   • Years of education: 16   • Highest education level: Bachelor's degree (e.g., BA, AB, BS)   Tobacco Use   • Smoking status: Former     Packs/day: 1.00     Years: 10.00     Pack years: 10.00     Types: Cigarettes     Start date: 1975     Quit date: 3/25/1985     Years since quittin.8   • Smokeless tobacco: Never   • Tobacco comments:     QUIT AGE 29   Vaping Use   • Vaping Use: Never used   Substance and Sexual Activity   • Alcohol use: Yes     Alcohol/week: 2.0 standard drinks     Types: 2 Glasses of wine per week     Comment: THREE TIMES WEEK, BEER   • Drug use: No   • Sexual activity: Yes     Partners: Female     Birth control/protection: Surgical        Current Outpatient Medications:   •  aspirin 81 MG EC tablet, Take 81 mg by mouth Every Morning., Disp: , Rfl:   •  B Complex Vitamins (VITAMIN B COMPLEX PO), Take 1 tablet by mouth Every Morning. ON HOLD, Disp: , Rfl:   •  ezetimibe (Zetia) 10 MG tablet, Take 1 tablet by mouth Daily., Disp: 90 tablet, Rfl: 3  •  Fluticasone-Umeclidin-Vilant (Trelegy Ellipta) 100-62.5-25 MCG/INH inhaler, Inhale 1 puff Daily., Disp: 1 each, Rfl: 0  •  folic acid (FOLVITE) 400 MCG tablet, Take 400 mcg by mouth Daily. ON HOLD, Disp: , Rfl:   •  lisinopril (PRINIVIL,ZESTRIL) 5 MG tablet, Take 1 tablet by mouth Daily., Disp: 90 tablet, Rfl: 3  •  metoprolol tartrate (LOPRESSOR) 25 MG tablet, Take 1 tablet by mouth Every 12 (Twelve) Hours. PLEASE SCHEDULE AN APPOINTMENT FOR FURTHER REFILLS., Disp: 180 tablet, Rfl: 3  •  nitroglycerin (NITROSTAT) 0.4 MG SL tablet, 1 under the tongue as needed for angina, may repeat q5mins for up three doses, Disp: 100 tablet, Rfl: 11  •  simvastatin (ZOCOR) 40 MG tablet, Take 1 tablet by mouth Every Evening., Disp: 90 tablet, Rfl: 3  •  vitamin C (ASCORBIC ACID) 250 MG tablet, Take 250 mg by mouth Every Morning. ON HOLD, Disp: , Rfl:   No current  facility-administered medications for this visit.    Facility-Administered Medications Ordered in Other Visits:   •  Chlorhexidine Gluconate Cloth 2 % pads 1 application, 1 application, Topical, Q12H RIVERN, Rajwinder Carl APRN     Objective     History of Present Illness     Review of Systems    Physical Exam  Vitals and nursing note reviewed.   Constitutional:       General: He is not in acute distress.     Appearance: Normal appearance. He is not ill-appearing.   HENT:      Head: Normocephalic and atraumatic.   Cardiovascular:      Rate and Rhythm: Normal rate and regular rhythm.      Heart sounds: Normal heart sounds.   Pulmonary:      Effort: Pulmonary effort is normal. No respiratory distress.      Breath sounds: Normal breath sounds. No wheezing or rales.   Musculoskeletal:         General: Normal range of motion.   Skin:     General: Skin is warm and dry.   Neurological:      General: No focal deficit present.      Mental Status: He is alert and oriented to person, place, and time.   Psychiatric:         Mood and Affect: Mood normal.         Behavior: Behavior normal.         ASSESSMENT PSA remains normal at 2.23.  CMP had a sugar of 103 and was otherwise normal and hemoglobin A1c is improved to 6.2.  Lipid panel has total cholesterol 117, HDL 54, LDL 42 and urine microalbumin remains low at 3.4.  #1-hypertension controlled on medication  #2-hyperlipidemia, well controlled on medication  3-history of coronary artery disease, asymptomatic  #4-hyperglycemia with improved acceptable hemoglobin A1c  #5-history of fall with fractured left wrist, good recovery       Problems Addressed this Visit        Cardiac and Vasculature    Hyperlipidemia    Essential hypertension    Coronary artery disease involving native coronary artery - Primary       Endocrine and Metabolic    Prediabetes   Diagnoses       Codes Comments    Coronary artery disease involving native coronary artery of native heart without angina pectoris     -  Primary ICD-10-CM: I25.10  ICD-9-CM: 414.01     Essential hypertension     ICD-10-CM: I10  ICD-9-CM: 401.9     Hyperlipidemia, unspecified hyperlipidemia type     ICD-10-CM: E78.5  ICD-9-CM: 272.4     Prediabetes     ICD-10-CM: R73.03  ICD-9-CM: 790.29           PLAN the patient will receive a Prevnar 20 vaccination today.  I recommended he get a COVID booster and consider the shingles immunizations.  He will continue current medicines as now and I encouraged continued weight loss.  I plan on rechecking him in 6 months with a CBC, CMP, hemoglobin A1c, lipid panel    There are no Patient Instructions on file for this visit.  No follow-ups on file.

## 2023-03-06 ENCOUNTER — OFFICE VISIT (OUTPATIENT)
Dept: CARDIOLOGY | Facility: CLINIC | Age: 68
End: 2023-03-06
Payer: MEDICARE

## 2023-03-06 VITALS
DIASTOLIC BLOOD PRESSURE: 75 MMHG | WEIGHT: 190 LBS | HEIGHT: 69 IN | BODY MASS INDEX: 28.14 KG/M2 | HEART RATE: 55 BPM | SYSTOLIC BLOOD PRESSURE: 130 MMHG

## 2023-03-06 DIAGNOSIS — I25.10 CORONARY ARTERY DISEASE INVOLVING NATIVE CORONARY ARTERY OF NATIVE HEART WITHOUT ANGINA PECTORIS: Primary | ICD-10-CM

## 2023-03-06 DIAGNOSIS — R06.02 SOB (SHORTNESS OF BREATH): ICD-10-CM

## 2023-03-06 DIAGNOSIS — R07.2 PRECORDIAL PAIN: ICD-10-CM

## 2023-03-06 DIAGNOSIS — I10 ESSENTIAL HYPERTENSION: ICD-10-CM

## 2023-03-06 PROCEDURE — 3075F SYST BP GE 130 - 139MM HG: CPT | Performed by: INTERNAL MEDICINE

## 2023-03-06 PROCEDURE — 93000 ELECTROCARDIOGRAM COMPLETE: CPT | Performed by: INTERNAL MEDICINE

## 2023-03-06 PROCEDURE — 99214 OFFICE O/P EST MOD 30 MIN: CPT | Performed by: INTERNAL MEDICINE

## 2023-03-06 PROCEDURE — 3078F DIAST BP <80 MM HG: CPT | Performed by: INTERNAL MEDICINE

## 2023-03-06 NOTE — PROGRESS NOTES
1 year follow up    Subjective:        Sharath Montero is a 67 y.o. male who here for follow up    CC  Follow-up coronary artery disease hypertension chest pain  HPI  67-year-old male known history of coronary artery disease precordial chest pain and hypertension here for the follow-up     Problems Addressed this Visit        Cardiac and Vasculature    Essential hypertension    Precordial pain    Coronary artery disease involving native coronary artery - Primary    Relevant Orders    Treadmill Stress Test (Completed)       Pulmonary and Pneumonias    SOB (shortness of breath)   Diagnoses       Codes Comments    Coronary artery disease involving native coronary artery of native heart without angina pectoris    -  Primary ICD-10-CM: I25.10  ICD-9-CM: 414.01     SOB (shortness of breath)     ICD-10-CM: R06.02  ICD-9-CM: 786.05     Precordial pain     ICD-10-CM: R07.2  ICD-9-CM: 786.51     Essential hypertension     ICD-10-CM: I10  ICD-9-CM: 401.9         .    The following portions of the patient's history were reviewed and updated as appropriate: allergies, current medications, past family history, past medical history, past social history, past surgical history and problem list.    Past Medical History:   Diagnosis Date   • ADHD (attention deficit hyperactivity disorder) NO   • Allergic NO   • Anemia NO   • Anxiety NO   • Arthritis NO   • Asthma NO   • Benign prostatic hyperplasia NO   • Cancer ([facility]) NO   • Cataract NO   • Chest pain     possibly due to pericarditis   • CHF (congestive heart failure) ([facility]) NO   • Cholelithiasis NO   • Clotting disorder ([facility]) NO   • Colon polyp NO   • Congenital heart disease 7.11.19   • COPD (chronic obstructive pulmonary disease) ([facility]) NO   • Coronary artery disease     mild to moderate   • Deep vein thrombosis ([facility]) NO   • Depression NO   • Diabetes mellitus ([facility]) NO   • Diverticulosis NO   • Eating disorder NO   • Erectile dysfunction NO   • Fibromyalgia, primary NO   • GERD  "(gastroesophageal reflux disease) NO   • Glaucoma NO   • Headache NO   • Heart disease    • Heart murmur NO   • History of positive PPD     as a child    • History of rotator cuff tear     LEFT 1988   • HIV disease ([facility]) NO   • HL (hearing loss) NO   • Hyperlipidemia    • Hypertension    • Infectious viral hepatitis NO     reports that he quit smoking about 38 years ago. His smoking use included cigarettes. He started smoking about 52 years ago. He has a 10.00 pack-year smoking history. He has never used smokeless tobacco. He reports current alcohol use of about 2.0 standard drinks per week. He reports that he does not use drugs.   Family History   Problem Relation Age of Onset   • Other Mother         cabg   • Heart disease Mother    • Heart disease Father    • Other Father         cabg   • Heart attack Father    • Other Sister         BLOOD DISEASE    • Malig Hyperthermia Neg Hx        Review of Systems  Constitutional: No wt loss, fever, fatigue  Gastrointestinal: No nausea, abdominal pain  Behavioral/Psych: No insomnia or anxiety   Cardiovascular no chest pain  Objective:       Physical Exam           Physical Exam  /75   Pulse 55   Ht 175.3 cm (69.02\")   Wt 86.2 kg (190 lb)   BMI 28.04 kg/m²     General appearance: No acute changes   Eyes: Sclerae conjunctivae normal, pupils reactive   HENT: Atraumatic; oropharynx clear with moist mucous membranes and no mucosal ulcerations;  Neck: Trachea midline; NECK, supple, no thyromegaly or lymphadenopathy   Lungs: Normal size and shape, normal breath sounds, equal distribution of air, no rales and rhonchi   CV: S1-S2 regular, no murmurs, no rub, no gallop   Abdomen: Soft, nontender; no masses , no abnormal abdominal sounds   Extremities: No deformity , normal color , no peripheral edema   Skin: Normal temperature, turgor and texture; no rash, ulcers  Psych: Appropriate affect, alert and oriented to person, place and time             ECG 12 Lead    Date/Time: " 3/6/2023 11:21 AM  Performed by: Guera Perry MD  Authorized by: Guera Perry MD   Comparison: compared with previous ECG   Similar to previous ECG  Rhythm: sinus rhythm  ST Flattening: all    Clinical impression: non-specific ECG              Echocardiogram:        Current Outpatient Medications:   •  aspirin 81 MG EC tablet, Take 1 tablet by mouth Every Morning., Disp: , Rfl:   •  B Complex Vitamins (VITAMIN B COMPLEX PO), Take 1 tablet by mouth Every Morning. ON HOLD, Disp: , Rfl:   •  ezetimibe (Zetia) 10 MG tablet, Take 1 tablet by mouth Daily., Disp: 90 tablet, Rfl: 3  •  Fluticasone-Umeclidin-Vilant (Trelegy Ellipta) 100-62.5-25 MCG/INH inhaler, Inhale 1 puff Daily., Disp: 1 each, Rfl: 0  •  folic acid (FOLVITE) 400 MCG tablet, Take 1 tablet by mouth Daily. ON HOLD, Disp: , Rfl:   •  lisinopril (PRINIVIL,ZESTRIL) 5 MG tablet, Take 1 tablet by mouth Daily., Disp: 90 tablet, Rfl: 3  •  nitroglycerin (NITROSTAT) 0.4 MG SL tablet, 1 under the tongue as needed for angina, may repeat q5mins for up three doses, Disp: 100 tablet, Rfl: 11  •  simvastatin (ZOCOR) 40 MG tablet, Take 1 tablet by mouth Every Evening., Disp: 90 tablet, Rfl: 3  •  vitamin C (ASCORBIC ACID) 250 MG tablet, Take 1 tablet by mouth Every Morning. ON HOLD, Disp: , Rfl:   •  metoprolol tartrate (LOPRESSOR) 25 MG tablet, TAKE ONE TABLET BY MOUTH EVERY 12 HOURS, Disp: 180 tablet, Rfl: 2  No current facility-administered medications for this visit.    Facility-Administered Medications Ordered in Other Visits:   •  Chlorhexidine Gluconate Cloth 2 % pads 1 application, 1 application, Topical, Q12H PRN, Rajwinder Carl APRN   Assessment:        Patient Active Problem List   Diagnosis   • Heart disease   • Hyperlipidemia   • Essential hypertension   • History of positive PPD   • Prediabetes   • Precordial pain   • Abnormal cardiac function test   • Coronary artery disease involving native coronary artery   • SOB (shortness of breath)    • Precordial chest pain   • Environmental and seasonal allergies               Plan:            ICD-10-CM ICD-9-CM   1. Coronary artery disease involving native coronary artery of native heart without angina pectoris  I25.10 414.01   2. SOB (shortness of breath)  R06.02 786.05   3. Precordial pain  R07.2 786.51   4. Essential hypertension  I10 401.9     1. Coronary artery disease involving native coronary artery of native heart without angina pectoris  Considering the patient's symptoms as well as clinical situation and  EKG findings, along with cardiac risk factors, ischemic workup is necessary to rule out ischemic cardiomyopathy, stress induced arrhythmias, and functional capacity for diagnosis as well as prognostic consideration    - Treadmill Stress Test    2. SOB (shortness of breath)  Multifactorial    3. Precordial pain  Atypical    4. Essential hypertension  Continue current treatment       Etthelen see in 1 yr  COUNSELING:    Sharath Gary was given to patient for the following topics: diagnostic results, risk factor reductions, impressions, risks and benefits of treatment options and importance of treatment compliance .       SMOKING COUNSELING:        Dictated using Dragon dictation

## 2023-03-17 ENCOUNTER — HOSPITAL ENCOUNTER (OUTPATIENT)
Dept: CARDIOLOGY | Facility: HOSPITAL | Age: 68
Discharge: HOME | End: 2023-03-17
Admitting: INTERNAL MEDICINE
Payer: MEDICARE

## 2023-03-17 DIAGNOSIS — I25.10 CORONARY ARTERY DISEASE INVOLVING NATIVE CORONARY ARTERY OF NATIVE HEART WITHOUT ANGINA PECTORIS: ICD-10-CM

## 2023-03-17 DIAGNOSIS — R94.30 ABNORMAL CARDIAC FUNCTION TEST: ICD-10-CM

## 2023-03-17 DIAGNOSIS — I10 ESSENTIAL HYPERTENSION: Primary | ICD-10-CM

## 2023-03-17 PROCEDURE — 93017 CV STRESS TEST TRACING ONLY: CPT

## 2023-03-17 PROCEDURE — 93018 CV STRESS TEST I&R ONLY: CPT | Performed by: INTERNAL MEDICINE

## 2023-03-20 LAB
BH CV STRESS BP STAGE 2: NORMAL
BH CV STRESS BP STAGE 3: NORMAL
BH CV STRESS DURATION MIN STAGE 1: 3
BH CV STRESS DURATION MIN STAGE 2: 3
BH CV STRESS DURATION MIN STAGE 3: 3
BH CV STRESS DURATION MIN STAGE 4: 2
BH CV STRESS DURATION SEC STAGE 1: 0
BH CV STRESS DURATION SEC STAGE 2: 0
BH CV STRESS DURATION SEC STAGE 3: 0
BH CV STRESS DURATION SEC STAGE 4: 38
BH CV STRESS GRADE STAGE 1: 10
BH CV STRESS GRADE STAGE 2: 12
BH CV STRESS GRADE STAGE 3: 14
BH CV STRESS GRADE STAGE 4: 16
BH CV STRESS HR STAGE 1: 90
BH CV STRESS HR STAGE 2: 106
BH CV STRESS HR STAGE 3: 115
BH CV STRESS HR STAGE 4: 132
BH CV STRESS METS STAGE 1: 5
BH CV STRESS METS STAGE 2: 7.5
BH CV STRESS METS STAGE 3: 10
BH CV STRESS METS STAGE 4: 13.5
BH CV STRESS PROTOCOL 1: NORMAL
BH CV STRESS RECOVERY BP: NORMAL MMHG
BH CV STRESS RECOVERY HR: 78 BPM
BH CV STRESS SPEED STAGE 1: 1.7
BH CV STRESS SPEED STAGE 2: 2.5
BH CV STRESS SPEED STAGE 3: 3.4
BH CV STRESS SPEED STAGE 4: 4.2
BH CV STRESS STAGE 1: 1
BH CV STRESS STAGE 2: 2
BH CV STRESS STAGE 3: 3
BH CV STRESS STAGE 4: 4
MAXIMAL PREDICTED HEART RATE: 155 BPM
PERCENT MAX PREDICTED HR: 85.16 %
STRESS BASELINE BP: NORMAL MMHG
STRESS BASELINE HR: 65 BPM
STRESS PERCENT HR: 100 %
STRESS POST ESTIMATED WORKLOAD: 13.4 METS
STRESS POST EXERCISE DUR MIN: 11 MIN
STRESS POST EXERCISE DUR SEC: 38 SEC
STRESS POST PEAK BP: NORMAL MMHG
STRESS POST PEAK HR: 132 BPM
STRESS TARGET HR: 132 BPM

## 2023-04-24 ENCOUNTER — HOSPITAL ENCOUNTER (OUTPATIENT)
Dept: CARDIOLOGY | Facility: HOSPITAL | Age: 68
Discharge: HOME | End: 2023-04-24
Payer: MEDICARE

## 2023-04-24 VITALS
BODY MASS INDEX: 28.14 KG/M2 | DIASTOLIC BLOOD PRESSURE: 77 MMHG | SYSTOLIC BLOOD PRESSURE: 123 MMHG | HEIGHT: 69 IN | RESPIRATION RATE: 18 BRPM | OXYGEN SATURATION: 97 % | WEIGHT: 190 LBS | HEART RATE: 59 BPM

## 2023-04-24 LAB
BH CV IMMEDIATE POST RECOVERY TECH DATA SYMPTOMS: NORMAL
BH CV IMMEDIATE POST TECH DATA BLOOD PRESSURE: NORMAL MMHG
BH CV IMMEDIATE POST TECH DATA HEART RATE: 108 BPM
BH CV REST NUCLEAR ISOTOPE DOSE: 10.9 MCI
BH CV STRESS BP STAGE 1: NORMAL
BH CV STRESS BP STAGE 2: NORMAL
BH CV STRESS BP STAGE 3: NORMAL
BH CV STRESS BP STAGE 4: NORMAL
BH CV STRESS DURATION MIN STAGE 1: 3
BH CV STRESS DURATION MIN STAGE 2: 2
BH CV STRESS DURATION MIN STAGE 3: 3
BH CV STRESS DURATION MIN STAGE 4: 1
BH CV STRESS DURATION SEC STAGE 1: 27
BH CV STRESS DURATION SEC STAGE 2: 40
BH CV STRESS DURATION SEC STAGE 3: 0
BH CV STRESS DURATION SEC STAGE 4: 40
BH CV STRESS GRADE STAGE 1: 10
BH CV STRESS GRADE STAGE 2: 12
BH CV STRESS GRADE STAGE 3: 14
BH CV STRESS GRADE STAGE 4: 16
BH CV STRESS HR STAGE 1: 84
BH CV STRESS HR STAGE 2: 103
BH CV STRESS HR STAGE 3: 117
BH CV STRESS HR STAGE 4: 133
BH CV STRESS METS STAGE 1: 4.6
BH CV STRESS METS STAGE 2: 7.1
BH CV STRESS METS STAGE 3: 10.2
BH CV STRESS METS STAGE 4: 12.5
BH CV STRESS NUCLEAR ISOTOPE DOSE: 31.9 MCI
BH CV STRESS PROTOCOL 1: NORMAL
BH CV STRESS RECOVERY BP: NORMAL MMHG
BH CV STRESS RECOVERY HR: 79 BPM
BH CV STRESS RECOVERY O2: 97 %
BH CV STRESS SPEED STAGE 1: 1.7
BH CV STRESS SPEED STAGE 2: 2.5
BH CV STRESS SPEED STAGE 3: 3.4
BH CV STRESS SPEED STAGE 4: 4.2
BH CV STRESS STAGE 1: 1
BH CV STRESS STAGE 2: 2
BH CV STRESS STAGE 3: 3
BH CV STRESS STAGE 4: 4
BH CV THREE MINUTE POST TECH DATA BLOOD PRESSURE: NORMAL MMHG
BH CV THREE MINUTE POST TECH DATA HEART RATE: 82 BPM
LV EF NUC BP: 57 %
MAXIMAL PREDICTED HEART RATE: 155 BPM
PERCENT MAX PREDICTED HR: 85.81 %
STRESS BASELINE BP: NORMAL MMHG
STRESS BASELINE HR: 61 BPM
STRESS O2 SAT REST: 97 %
STRESS PERCENT HR: 101 %
STRESS POST ESTIMATED WORKLOAD: 12.5 METS
STRESS POST EXERCISE DUR MIN: 10 MIN
STRESS POST EXERCISE DUR SEC: 47 SEC
STRESS POST PEAK BP: NORMAL MMHG
STRESS POST PEAK HR: 133 BPM
STRESS TARGET HR: 132 BPM

## 2023-04-24 PROCEDURE — A9500 TC99M SESTAMIBI: HCPCS | Performed by: INTERNAL MEDICINE

## 2023-04-24 PROCEDURE — 78452 HT MUSCLE IMAGE SPECT MULT: CPT

## 2023-04-24 PROCEDURE — 0 TECHNETIUM SESTAMIBI: Performed by: INTERNAL MEDICINE

## 2023-04-24 PROCEDURE — 93017 CV STRESS TEST TRACING ONLY: CPT

## 2023-04-24 RX ADMIN — TECHNETIUM TC 99M SESTAMIBI 1 DOSE: 1 INJECTION INTRAVENOUS at 08:05

## 2023-04-24 RX ADMIN — TECHNETIUM TC 99M SESTAMIBI 1 DOSE: 1 INJECTION INTRAVENOUS at 11:08

## 2023-05-01 ENCOUNTER — OFFICE VISIT (OUTPATIENT)
Dept: CARDIOLOGY | Facility: CLINIC | Age: 68
End: 2023-05-01
Payer: MEDICARE

## 2023-05-01 VITALS
BODY MASS INDEX: 28.14 KG/M2 | DIASTOLIC BLOOD PRESSURE: 77 MMHG | HEIGHT: 69 IN | HEART RATE: 54 BPM | SYSTOLIC BLOOD PRESSURE: 130 MMHG | WEIGHT: 190 LBS

## 2023-05-01 DIAGNOSIS — R07.2 PRECORDIAL PAIN: ICD-10-CM

## 2023-05-01 DIAGNOSIS — I10 ESSENTIAL HYPERTENSION: Primary | ICD-10-CM

## 2023-05-01 DIAGNOSIS — R06.02 SOB (SHORTNESS OF BREATH): ICD-10-CM

## 2023-05-01 DIAGNOSIS — I25.10 CORONARY ARTERY DISEASE INVOLVING NATIVE CORONARY ARTERY OF NATIVE HEART WITHOUT ANGINA PECTORIS: ICD-10-CM

## 2023-05-01 PROCEDURE — 3075F SYST BP GE 130 - 139MM HG: CPT | Performed by: INTERNAL MEDICINE

## 2023-05-01 PROCEDURE — 3078F DIAST BP <80 MM HG: CPT | Performed by: INTERNAL MEDICINE

## 2023-05-01 PROCEDURE — 99213 OFFICE O/P EST LOW 20 MIN: CPT | Performed by: INTERNAL MEDICINE

## 2023-05-01 NOTE — PROGRESS NOTES
Stress test Results    Subjective:        Sharath Montero is a 67 y.o. male who here for follow up    CC  Follow-up coronary artery disease precordial chest pain and hypertension  HPI  67-year-old male with coronary artery disease hypertension precordial chest pain and shortness of breath here for the follow-up and stress test results     Problems Addressed this Visit        Cardiac and Vasculature    Essential hypertension - Primary    Precordial pain    Coronary artery disease involving native coronary artery of native heart without angina pectoris       Pulmonary and Pneumonias    SOB (shortness of breath)   Diagnoses       Codes Comments    Essential hypertension    -  Primary ICD-10-CM: I10  ICD-9-CM: 401.9     Coronary artery disease involving native coronary artery of native heart without angina pectoris     ICD-10-CM: I25.10  ICD-9-CM: 414.01     SOB (shortness of breath)     ICD-10-CM: R06.02  ICD-9-CM: 786.05     Precordial pain     ICD-10-CM: R07.2  ICD-9-CM: 786.51         .Interpretation Summary       •  Findings consistent with an equivocal ECG stress test.  •  Left ventricular ejection fraction is normal (Calculated EF = 57%).  •  Moderate risk for ischemic heart disease.  •  Myocardial perfusion imaging indicates a normal myocardial perfusion study with no evidence of ischemia.  •  Impressions are consistent with a low risk study.      The following portions of the patient's history were reviewed and updated as appropriate: allergies, current medications, past family history, past medical history, past social history, past surgical history and problem list.    Past Medical History:   Diagnosis Date   • ADHD (attention deficit hyperactivity disorder) NO   • Allergic NO   • Anemia NO   • Anxiety NO   • Arthritis NO   • Asthma NO   • Benign prostatic hyperplasia NO   • Cancer NO   • Cataract NO   • Chest pain     possibly due to pericarditis   • CHF (congestive heart failure) NO   • Cholelithiasis  "NO   • Clotting disorder NO   • Colon polyp NO   • Congenital heart disease 7.11.19   • COPD (chronic obstructive pulmonary disease) NO   • Coronary artery disease     mild to moderate   • Deep vein thrombosis NO   • Depression NO   • Diabetes mellitus NO   • Diverticulosis NO   • Eating disorder NO   • Erectile dysfunction NO   • Fibromyalgia, primary NO   • GERD (gastroesophageal reflux disease) NO   • Glaucoma NO   • Headache NO   • Heart disease    • Heart murmur NO   • History of positive PPD     as a child    • History of rotator cuff tear     LEFT 1988   • HIV disease NO   • HL (hearing loss) NO   • Hyperlipidemia    • Hypertension    • Infectious viral hepatitis NO     reports that he quit smoking about 39 years ago. His smoking use included cigarettes. He started smoking about 52 years ago. He has a 10.00 pack-year smoking history. He has never used smokeless tobacco. He reports current alcohol use of about 2.0 standard drinks per week. He reports that he does not use drugs.   Family History   Problem Relation Age of Onset   • Other Mother         cabg   • Heart disease Mother    • Heart disease Father    • Other Father         cabg   • Heart attack Father    • Other Sister         BLOOD DISEASE    • Malig Hyperthermia Neg Hx        Review of Systems  Constitutional: No wt loss, fever, fatigue  Gastrointestinal: No nausea, abdominal pain  Behavioral/Psych: No insomnia or anxiety   Cardiovascular no chest pains or tightness in the chest  Objective:       Physical Exam           Physical Exam  /77   Pulse 54   Ht 175.3 cm (69\")   Wt 86.2 kg (190 lb)   BMI 28.06 kg/m²     General appearance: No acute changes   Eyes: Sclerae conjunctivae normal, pupils reactive   HENT: Atraumatic; oropharynx clear with moist mucous membranes and no mucosal ulcerations;  Neck: Trachea midline; NECK, supple, no thyromegaly or lymphadenopathy   Lungs: Normal size and shape, normal breath sounds, equal distribution of " air, no rales and rhonchi   CV: S1-S2 regular, no murmurs, no rub, no gallop   Abdomen: Soft, nontender; no masses , no abnormal abdominal sounds   Extremities: No deformity , normal color , no peripheral edema   Skin: Normal temperature, turgor and texture; no rash, ulcers  Psych: Appropriate affect, alert and oriented to person, place and time           Procedures      Echocardiogram:        Current Outpatient Medications:   •  aspirin 81 MG EC tablet, Take 1 tablet by mouth Every Morning., Disp: , Rfl:   •  B Complex Vitamins (VITAMIN B COMPLEX PO), Take 1 tablet by mouth Every Morning. ON HOLD, Disp: , Rfl:   •  ezetimibe (Zetia) 10 MG tablet, Take 1 tablet by mouth Daily., Disp: 90 tablet, Rfl: 3  •  Fluticasone-Umeclidin-Vilant (Trelegy Ellipta) 100-62.5-25 MCG/INH inhaler, Inhale 1 puff Daily., Disp: 1 each, Rfl: 0  •  folic acid (FOLVITE) 400 MCG tablet, Take 1 tablet by mouth Daily. ON HOLD, Disp: , Rfl:   •  lisinopril (PRINIVIL,ZESTRIL) 5 MG tablet, Take 1 tablet by mouth Daily., Disp: 90 tablet, Rfl: 3  •  metoprolol tartrate (LOPRESSOR) 25 MG tablet, TAKE ONE TABLET BY MOUTH EVERY 12 HOURS, Disp: 180 tablet, Rfl: 2  •  nitroglycerin (NITROSTAT) 0.4 MG SL tablet, 1 under the tongue as needed for angina, may repeat q5mins for up three doses, Disp: 100 tablet, Rfl: 11  •  simvastatin (ZOCOR) 40 MG tablet, Take 1 tablet by mouth Every Evening., Disp: 90 tablet, Rfl: 3  •  vitamin C (ASCORBIC ACID) 250 MG tablet, Take 1 tablet by mouth Every Morning. ON HOLD, Disp: , Rfl:   No current facility-administered medications for this visit.    Facility-Administered Medications Ordered in Other Visits:   •  Chlorhexidine Gluconate Cloth 2 % pads 1 application, 1 application, Topical, Q12H PRN, Rajwinder Carl APRN   Assessment:        Patient Active Problem List   Diagnosis   • Heart disease   • Hyperlipidemia   • Essential hypertension   • History of positive PPD   • Prediabetes   • Precordial pain   • Abnormal  cardiac function test   • Coronary artery disease involving native coronary artery of native heart without angina pectoris   • SOB (shortness of breath)   • Precordial chest pain   • Environmental and seasonal allergies               Plan:            ICD-10-CM ICD-9-CM   1. Essential hypertension  I10 401.9   2. Coronary artery disease involving native coronary artery of native heart without angina pectoris  I25.10 414.01   3. SOB (shortness of breath)  R06.02 786.05   4. Precordial pain  R07.2 786.51     1. Essential hypertension  Blood pressure under control    2. Coronary artery disease involving native coronary artery of native heart without angina pectoris  No angina pectoris    3. SOB (shortness of breath)  Multifactorial    4. Precordial pain  Atypical       Specificity and sensitivity of the stress test/ cardiac workup has been explained. Pt has been explained if  Symptoms continue please go to ER, and further w/p will be required.    Also explained this does not rule out coronary artery disease or the future events, continue to emphasize on risk reductions for coronary artery disease    Pt also advised to contact PCP for other causes of symptoms      1 yr  COUNSELING:    Sharath Gary was given to patient for the following topics: diagnostic results, risk factor reductions, impressions, risks and benefits of treatment options and importance of treatment compliance .       SMOKING COUNSELING:        Dictated using Dragon dictation

## 2023-06-15 DIAGNOSIS — R73.03 PREDIABETES: ICD-10-CM

## 2023-06-15 DIAGNOSIS — E78.5 HYPERLIPIDEMIA, UNSPECIFIED HYPERLIPIDEMIA TYPE: Primary | ICD-10-CM

## 2023-06-15 DIAGNOSIS — I10 ESSENTIAL HYPERTENSION: ICD-10-CM

## 2023-06-24 LAB
ALBUMIN SERPL-MCNC: 4.9 G/DL (ref 3.5–5.2)
ALBUMIN/GLOB SERPL: 2 G/DL
ALP SERPL-CCNC: 63 U/L (ref 39–117)
ALT SERPL-CCNC: 36 U/L (ref 1–41)
AST SERPL-CCNC: 24 U/L (ref 1–40)
BASOPHILS # BLD AUTO: 0.03 10*3/MM3 (ref 0–0.2)
BASOPHILS NFR BLD AUTO: 0.5 % (ref 0–1.5)
BILIRUB SERPL-MCNC: 0.6 MG/DL (ref 0–1.2)
BUN SERPL-MCNC: 15 MG/DL (ref 8–23)
BUN/CREAT SERPL: 14.7 (ref 7–25)
CALCIUM SERPL-MCNC: 9.9 MG/DL (ref 8.6–10.5)
CHLORIDE SERPL-SCNC: 102 MMOL/L (ref 98–107)
CHOLEST SERPL-MCNC: 128 MG/DL (ref 0–200)
CO2 SERPL-SCNC: 28.9 MMOL/L (ref 22–29)
CREAT SERPL-MCNC: 1.02 MG/DL (ref 0.76–1.27)
EGFRCR SERPLBLD CKD-EPI 2021: 81.6 ML/MIN/1.73
EOSINOPHIL # BLD AUTO: 0.15 10*3/MM3 (ref 0–0.4)
EOSINOPHIL NFR BLD AUTO: 2.5 % (ref 0.3–6.2)
ERYTHROCYTE [DISTWIDTH] IN BLOOD BY AUTOMATED COUNT: 12.6 % (ref 12.3–15.4)
GLOBULIN SER CALC-MCNC: 2.4 GM/DL
GLUCOSE SERPL-MCNC: 116 MG/DL (ref 65–99)
HBA1C MFR BLD: 6.4 % (ref 4.8–5.6)
HCT VFR BLD AUTO: 47.5 % (ref 37.5–51)
HDLC SERPL-MCNC: 54 MG/DL (ref 40–60)
HGB BLD-MCNC: 15.7 G/DL (ref 13–17.7)
IMM GRANULOCYTES # BLD AUTO: 0.02 10*3/MM3 (ref 0–0.05)
IMM GRANULOCYTES NFR BLD AUTO: 0.3 % (ref 0–0.5)
LDLC SERPL CALC-MCNC: 55 MG/DL (ref 0–100)
LDLC/HDLC SERPL: 0.99 {RATIO}
LYMPHOCYTES # BLD AUTO: 1.71 10*3/MM3 (ref 0.7–3.1)
LYMPHOCYTES NFR BLD AUTO: 28.6 % (ref 19.6–45.3)
MCH RBC QN AUTO: 31.3 PG (ref 26.6–33)
MCHC RBC AUTO-ENTMCNC: 33.1 G/DL (ref 31.5–35.7)
MCV RBC AUTO: 94.6 FL (ref 79–97)
MONOCYTES # BLD AUTO: 0.68 10*3/MM3 (ref 0.1–0.9)
MONOCYTES NFR BLD AUTO: 11.4 % (ref 5–12)
NEUTROPHILS # BLD AUTO: 3.38 10*3/MM3 (ref 1.7–7)
NEUTROPHILS NFR BLD AUTO: 56.7 % (ref 42.7–76)
NRBC BLD AUTO-RTO: 0 /100 WBC (ref 0–0.2)
PLATELET # BLD AUTO: 166 10*3/MM3 (ref 140–450)
POTASSIUM SERPL-SCNC: 4.7 MMOL/L (ref 3.5–5.2)
PROT SERPL-MCNC: 7.3 G/DL (ref 6–8.5)
RBC # BLD AUTO: 5.02 10*6/MM3 (ref 4.14–5.8)
SODIUM SERPL-SCNC: 140 MMOL/L (ref 136–145)
TRIGL SERPL-MCNC: 103 MG/DL (ref 0–150)
VLDLC SERPL CALC-MCNC: 19 MG/DL (ref 5–40)
WBC # BLD AUTO: 5.97 10*3/MM3 (ref 3.4–10.8)

## 2023-08-14 ENCOUNTER — OFFICE VISIT (OUTPATIENT)
Dept: FAMILY MEDICINE CLINIC | Facility: CLINIC | Age: 68
End: 2023-08-14
Payer: MEDICARE

## 2023-08-14 VITALS
HEIGHT: 69 IN | WEIGHT: 204.6 LBS | SYSTOLIC BLOOD PRESSURE: 132 MMHG | BODY MASS INDEX: 30.3 KG/M2 | DIASTOLIC BLOOD PRESSURE: 76 MMHG | HEART RATE: 62 BPM | OXYGEN SATURATION: 96 %

## 2023-08-14 DIAGNOSIS — M25.811 IMPINGEMENT OF RIGHT SHOULDER: Primary | ICD-10-CM

## 2023-08-14 PROCEDURE — 3075F SYST BP GE 130 - 139MM HG: CPT | Performed by: INTERNAL MEDICINE

## 2023-08-14 PROCEDURE — 3078F DIAST BP <80 MM HG: CPT | Performed by: INTERNAL MEDICINE

## 2023-08-14 PROCEDURE — 99213 OFFICE O/P EST LOW 20 MIN: CPT | Performed by: INTERNAL MEDICINE

## 2023-08-15 NOTE — ASSESSMENT & PLAN NOTE
Start self-guided home PT. Start diclofenac gel every 6h PRN. Follow-up in one month. If no improvement, will offer CSI and get plain films.

## 2023-08-15 NOTE — PROGRESS NOTES
Moncho Campbell MD  Internal Medicine  [phone] (office)             08/14/2023    Patient Information  Sharath Montero                                                                                          [address] [facility] [address]  1955        Chief Complaint   Patient presents with    Arm Pain     Pt stated right arm pain it been going on for over 1 year.           History of Present Illness:    Sharath Montero is a 67 y.o. male who presents to the office for R shoulder and arm pain for about one year. He denies trauma. Pain worse with overhead movements and at night.           No Known Allergies        Current Outpatient Medications:     aspirin 81 MG EC tablet, Take 1 tablet by mouth Every Morning., Disp: , Rfl:     B Complex Vitamins (VITAMIN B COMPLEX PO), Take 1 tablet by mouth Every Morning. ON HOLD, Disp: , Rfl:     ezetimibe (Zetia) 10 MG tablet, Take 1 tablet by mouth Daily., Disp: 90 tablet, Rfl: 3    folic acid (FOLVITE) 400 MCG tablet, Take 1 tablet by mouth Daily. ON HOLD, Disp: , Rfl:     lisinopril (PRINIVIL,ZESTRIL) 5 MG tablet, Take 1 tablet by mouth Daily., Disp: 90 tablet, Rfl: 3    metoprolol tartrate (LOPRESSOR) 25 MG tablet, Take 1 tablet by mouth Every 12 (Twelve) Hours., Disp: 180 tablet, Rfl: 3    simvastatin (ZOCOR) 40 MG tablet, Take 1 tablet by mouth Every Evening., Disp: 90 tablet, Rfl: 3    vitamin C (ASCORBIC ACID) 250 MG tablet, Take 1 tablet by mouth Every Morning. ON HOLD, Disp: , Rfl:   No current facility-administered medications for this visit.    Facility-Administered Medications Ordered in Other Visits:     Chlorhexidine Gluconate Cloth 2 % pads 1 application, 1 application , Topical, Q12H PRN, Meseret, Rajwinder, APRN      Social History     Socioeconomic History    Marital status:      Spouse name: Kenyatta Montero    Number of children: 2    Years of education: 16    Highest education level: Bachelor's degree (e.g., BA, AB,  "BS)   Tobacco Use    Smoking status: Former     Packs/day: 1.00     Years: 10.00     Pack years: 10.00     Types: Cigarettes     Start date: 1970     Quit date: 1984     Years since quittin.3    Smokeless tobacco: Never    Tobacco comments:     QUIT AGE 29   Vaping Use    Vaping Use: Never used   Substance and Sexual Activity    Alcohol use: Yes     Alcohol/week: 2.0 standard drinks     Types: 2 Glasses of wine per week     Comment: THREE TIMES WEEK, BEER    Drug use: No    Sexual activity: Yes     Partners: Female     Birth control/protection: Surgical         Vitals:    23 1535   BP: 132/76   BP Location: Right arm   Patient Position: Sitting   Cuff Size: Adult   Pulse: 62   SpO2: 96%   Weight: 92.8 kg (204 lb 9.6 oz)   Height: 175.3 cm (69.02\")      Wt Readings from Last 3 Encounters:   23 92.8 kg (204 lb 9.6 oz)   23 91.9 kg (202 lb 11.2 oz)   23 86.2 kg (190 lb)     Body mass index is 30.2 kg/mý.      Physical Exam  Vitals reviewed.   Constitutional:       Appearance: Normal appearance. He is obese.   Pulmonary:      Effort: Pulmonary effort is normal.   Musculoskeletal:      Right shoulder: No swelling, deformity, effusion, laceration, tenderness, bony tenderness or crepitus. Decreased range of motion. Decreased strength.      Comments: R shoulder - Active overhead movement decreased due to pain. Crossarm test positive, Rutherford positive. Reported pain with internal and external rotation against resistance. Decreased strength in abduction.    Neurological:      Mental Status: He is alert and oriented to person, place, and time.   Psychiatric:         Mood and Affect: Mood normal.         Behavior: Behavior normal.          Assessment/Plan:    Diagnoses and all orders for this visit:    1. Impingement of right shoulder (Primary)  Assessment & Plan:  Start self-guided home PT. Start diclofenac gel every 6h PRN. Follow-up in one month. If no improvement, will offer CSI and get " plain films.

## 2023-09-15 ENCOUNTER — OFFICE VISIT (OUTPATIENT)
Dept: FAMILY MEDICINE CLINIC | Facility: CLINIC | Age: 68
End: 2023-09-15
Payer: MEDICARE

## 2023-09-15 VITALS
HEIGHT: 69 IN | WEIGHT: 203.7 LBS | BODY MASS INDEX: 30.17 KG/M2 | DIASTOLIC BLOOD PRESSURE: 78 MMHG | SYSTOLIC BLOOD PRESSURE: 130 MMHG | HEART RATE: 57 BPM | OXYGEN SATURATION: 98 %

## 2023-09-15 DIAGNOSIS — B35.1 ONYCHOMYCOSIS: ICD-10-CM

## 2023-09-15 DIAGNOSIS — E78.5 HYPERLIPIDEMIA, UNSPECIFIED HYPERLIPIDEMIA TYPE: ICD-10-CM

## 2023-09-15 DIAGNOSIS — I10 ESSENTIAL HYPERTENSION: ICD-10-CM

## 2023-09-15 DIAGNOSIS — R73.03 PREDIABETES: ICD-10-CM

## 2023-09-15 DIAGNOSIS — M25.811 IMPINGEMENT OF RIGHT SHOULDER: Primary | ICD-10-CM

## 2023-09-15 DIAGNOSIS — Z12.5 SCREENING PSA (PROSTATE SPECIFIC ANTIGEN): ICD-10-CM

## 2023-09-15 PROBLEM — R06.02 SOB (SHORTNESS OF BREATH): Status: RESOLVED | Noted: 2019-08-15 | Resolved: 2023-09-15

## 2023-09-15 PROCEDURE — 3075F SYST BP GE 130 - 139MM HG: CPT | Performed by: INTERNAL MEDICINE

## 2023-09-15 PROCEDURE — 99214 OFFICE O/P EST MOD 30 MIN: CPT | Performed by: INTERNAL MEDICINE

## 2023-09-15 PROCEDURE — 3078F DIAST BP <80 MM HG: CPT | Performed by: INTERNAL MEDICINE

## 2023-09-15 NOTE — PROGRESS NOTES
Moncho Campbell MD  Internal Medicine  [phone] (office)             09/15/2023    Patient Information  Sharath Montero                                                                                          [address] [facility] [address]  1955        Chief Complaint   Patient presents with    Arm Pain     1 month follow-up           History of Present Illness:    Sharath Montero is a 67 y.o. male who presents to the office for follow-up.     R shoulder impingement syndrome improving with self-guided therapy program. Patient is happy with progress and not interested in diclofenac gel or PT.     He is due for AAA screening.     Patient reports R great toenail has fallen off a few times and is now thick and painful.         No Known Allergies        Current Outpatient Medications:     aspirin 81 MG EC tablet, Take 1 tablet by mouth Every Morning., Disp: , Rfl:     B Complex Vitamins (VITAMIN B COMPLEX PO), Take 1 tablet by mouth Every Morning. ON HOLD, Disp: , Rfl:     ezetimibe (Zetia) 10 MG tablet, Take 1 tablet by mouth Daily., Disp: 90 tablet, Rfl: 3    folic acid (FOLVITE) 400 MCG tablet, Take 1 tablet by mouth Daily. ON HOLD, Disp: , Rfl:     lisinopril (PRINIVIL,ZESTRIL) 5 MG tablet, Take 1 tablet by mouth Daily., Disp: 90 tablet, Rfl: 3    metoprolol tartrate (LOPRESSOR) 25 MG tablet, Take 1 tablet by mouth Every 12 (Twelve) Hours., Disp: 180 tablet, Rfl: 3    simvastatin (ZOCOR) 40 MG tablet, Take 1 tablet by mouth Every Evening., Disp: 90 tablet, Rfl: 3    vitamin C (ASCORBIC ACID) 250 MG tablet, Take 1 tablet by mouth Every Morning. ON HOLD, Disp: , Rfl:   No current facility-administered medications for this visit.      Social History     Socioeconomic History    Marital status:      Spouse name: Kenyatta Montero    Number of children: 2    Years of education: 16    Highest education level: Bachelor's degree (e.g., BA, AB, BS)   Tobacco Use    Smoking status:  "Former     Packs/day: 1.00     Years: 10.00     Pack years: 10.00     Types: Cigarettes     Start date: 1970     Quit date: 1984     Years since quittin.4    Smokeless tobacco: Never    Tobacco comments:     QUIT AGE 29   Vaping Use    Vaping Use: Never used   Substance and Sexual Activity    Alcohol use: Yes     Alcohol/week: 2.0 standard drinks     Types: 2 Glasses of wine per week     Comment: THREE TIMES WEEK, BEER    Drug use: No    Sexual activity: Yes     Partners: Female     Birth control/protection: Surgical         Vitals:    09/15/23 0841   BP: 130/78   BP Location: Right arm   Patient Position: Sitting   Cuff Size: Adult   Pulse: 57   SpO2: 98%   Weight: 92.4 kg (203 lb 11.2 oz)   Height: 175.3 cm (69.02\")      Wt Readings from Last 3 Encounters:   09/15/23 92.4 kg (203 lb 11.2 oz)   23 92.8 kg (204 lb 9.6 oz)   23 91.9 kg (202 lb 11.2 oz)     Body mass index is 30.07 kg/m².      Physical Exam  Vitals reviewed.   Constitutional:       Appearance: Normal appearance. He is obese. He is not ill-appearing.   Feet:      Right foot:      Skin integrity: Skin integrity normal.      Toenail Condition: Right toenails are abnormally thick. Fungal disease present.  Neurological:      Mental Status: He is alert and oriented to person, place, and time.   Psychiatric:         Mood and Affect: Mood normal.         Behavior: Behavior normal.          Lab/other results:  Common labs          2022    08:05 2023    09:31   Common Labs   Glucose 103  116    BUN 14  15    Creatinine 0.95  1.02    Sodium 140  140    Potassium 4.3  4.7    Chloride 104  102    Calcium 9.7  9.9    Total Protein 6.9  7.3    Albumin 4.9  4.9    Total Bilirubin 0.5  0.6    Alkaline Phosphatase 63  63    AST (SGOT) 14  24    ALT (SGPT) 24  36    WBC  5.97    Hemoglobin  15.7    Hematocrit  47.5    Platelets  166    Total Cholesterol 117  128    Triglycerides 120  103    HDL Cholesterol 54  54    LDL Cholesterol  " 42  55    Hemoglobin A1C 6.20  6.40    Microalbumin, Urine 3.4     PSA 2.230         Assessment/Plan:    Diagnoses and all orders for this visit:    1. Impingement of right shoulder (Primary)  Assessment & Plan:  Improving, patient will continue self-guided therapy program.       2. Onychomycosis  Assessment & Plan:  R great toenail thick and there is some black areas on nail. I suspect this is just some bruising given his pain, but will refer to podiatry to evaluate and ensure he doesn't have melanoma. In the interim, patient will start applying Vicks vapor rub to nail twice daily.     Orders:  -     Ambulatory Referral to Podiatry    3. Prediabetes  Assessment & Plan:  A1c in 12/23, follow-up in 1/24 to review results. Continue active lifestyle.     Orders:  -     Comprehensive Metabolic Panel; Future  -     Hemoglobin A1c; Future    4. Hyperlipidemia, unspecified hyperlipidemia type  -     Lipid Panel; Future  -     Apolipoprotein B; Future    5. Essential hypertension  -     Microalbumin / Creatinine Urine Ratio - Urine, Clean Catch; Future    6. Screening PSA (prostate specific antigen)  -     PSA SCREENING; Future       Labs ordered for 12/23 and patient to follow-up in 1/24 for AMW examination. Encouraged him to get AAA screening, annual flu and RSV immunizations in the interim.

## 2023-09-15 NOTE — ASSESSMENT & PLAN NOTE
R great toenail thick and there is some black areas on nail. I suspect this is just some bruising given his pain, but will refer to podiatry to evaluate and ensure he doesn't have melanoma. In the interim, patient will start applying Vicks vapor rub to nail twice daily.

## 2024-01-05 ENCOUNTER — OFFICE VISIT (OUTPATIENT)
Dept: FAMILY MEDICINE CLINIC | Facility: CLINIC | Age: 69
End: 2024-01-05
Payer: MEDICARE

## 2024-01-05 ENCOUNTER — TRANSCRIBE ORDERS (OUTPATIENT)
Dept: FAMILY MEDICINE CLINIC | Facility: CLINIC | Age: 69
End: 2024-01-05

## 2024-01-05 VITALS
HEIGHT: 69 IN | HEART RATE: 57 BPM | RESPIRATION RATE: 16 BRPM | BODY MASS INDEX: 29.12 KG/M2 | DIASTOLIC BLOOD PRESSURE: 70 MMHG | SYSTOLIC BLOOD PRESSURE: 112 MMHG | OXYGEN SATURATION: 98 % | WEIGHT: 196.6 LBS

## 2024-01-05 DIAGNOSIS — I10 ESSENTIAL HYPERTENSION: Primary | ICD-10-CM

## 2024-01-05 DIAGNOSIS — Z13.6 ENCOUNTER FOR ABDOMINAL AORTIC ANEURYSM (AAA) SCREENING: ICD-10-CM

## 2024-01-05 DIAGNOSIS — E66.3 OVERWEIGHT (BMI 25.0-29.9): ICD-10-CM

## 2024-01-05 DIAGNOSIS — E78.5 HYPERLIPIDEMIA, UNSPECIFIED HYPERLIPIDEMIA TYPE: ICD-10-CM

## 2024-01-05 DIAGNOSIS — R73.03 PREDIABETES: ICD-10-CM

## 2024-01-05 PROBLEM — B35.1 ONYCHOMYCOSIS: Status: RESOLVED | Noted: 2023-09-15 | Resolved: 2024-01-05

## 2024-01-05 NOTE — PROGRESS NOTES
The ABCs of the Annual Wellness Visit  Subsequent Medicare Wellness Visit    Subjective    Sharath Montero is a 68 y.o. male who presents for a Subsequent Medicare Wellness Visit.    The following portions of the patient's history were reviewed and   updated as appropriate: allergies, current medications, past family history, past medical history, past social history, past surgical history, and problem list.    Compared to one year ago, the patient feels his physical   health is better.    Compared to one year ago, the patient feels his mental   health is better.    Recent Hospitalizations:  He was not admitted to the hospital during the last year.       Current Medical Providers:  Patient Care Team:  Александр Campbell MD as PCP - General (Internal Medicine)    Outpatient Medications Prior to Visit   Medication Sig Dispense Refill    aspirin 81 MG EC tablet Take 1 tablet by mouth Every Morning.      ezetimibe (Zetia) 10 MG tablet Take 1 tablet by mouth Daily. 90 tablet 3    lisinopril (PRINIVIL,ZESTRIL) 5 MG tablet Take 1 tablet by mouth Daily. 90 tablet 3    metoprolol tartrate (LOPRESSOR) 25 MG tablet Take 1 tablet by mouth Every 12 (Twelve) Hours. 180 tablet 3    simvastatin (ZOCOR) 40 MG tablet Take 1 tablet by mouth Every Evening. 90 tablet 3    B Complex Vitamins (VITAMIN B COMPLEX PO) Take 1 tablet by mouth Every Morning. ON HOLD      folic acid (FOLVITE) 400 MCG tablet Take 1 tablet by mouth Daily. ON HOLD      vitamin C (ASCORBIC ACID) 250 MG tablet Take 1 tablet by mouth Every Morning. ON HOLD       No facility-administered medications prior to visit.       No opioid medication identified on active medication list. I have reviewed chart for other potential  high risk medication/s and harmful drug interactions in the elderly.        Aspirin is on active medication list. Aspirin use is indicated based on review of current medical condition/s. Pros and cons of this therapy have been discussed  "today. Benefits of this medication outweigh potential harm.  Patient has been encouraged to continue taking this medication.  .      Patient Active Problem List   Diagnosis    Heart disease    Hyperlipidemia    Essential hypertension    History of positive PPD    Prediabetes    Precordial pain    Abnormal cardiac function test    Coronary artery disease involving native coronary artery of native heart without angina pectoris    Precordial chest pain    Environmental and seasonal allergies    Impingement of right shoulder    Overweight (BMI 25.0-29.9)     Advance Care Planning   Advance Care Planning     Advance Directive is not on file.  ACP discussion was held with the patient during this visit. Patient does not have an advance directive, information provided.     Objective    Vitals:    24 0842   BP: 112/70   BP Location: Right arm   Patient Position: Sitting   Cuff Size: Adult   Pulse: 57   Resp: 16   SpO2: 98%   Weight: 89.2 kg (196 lb 9.6 oz)   Height: 175.3 cm (69.02\")     Estimated body mass index is 29.02 kg/m² as calculated from the following:    Height as of this encounter: 175.3 cm (69.02\").    Weight as of this encounter: 89.2 kg (196 lb 9.6 oz).    BMI is >= 25 and <30. (Overweight) The following options were offered after discussion;: exercise counseling/recommendations and nutrition counseling/recommendations      Does the patient have evidence of cognitive impairment? No    Lab Results   Component Value Date    CHLPL 113 2023    TRIG 101 2023    HDL 54 2023    LDL 40 2023    VLDL 19 2023    HGBA1C 6.10 (H) 2023        HEALTH RISK ASSESSMENT    Smoking Status:  Social History     Tobacco Use   Smoking Status Former    Packs/day: 1.00    Years: 10.00    Additional pack years: 0.00    Total pack years: 10.00    Types: Cigarettes    Start date: 1970    Quit date: 1984    Years since quittin.7   Smokeless Tobacco Never   Tobacco Comments    QUIT AGE " 29     Alcohol Consumption:  Social History     Substance and Sexual Activity   Alcohol Use Yes    Alcohol/week: 2.0 standard drinks of alcohol    Types: 2 Glasses of wine per week    Comment: THREE TIMES WEEK, BEER     Fall Risk Screen:    ARCADIO Fall Risk Assessment was completed, and patient is at LOW risk for falls.Assessment completed on:2024    Depression Screenin/5/2024     8:42 AM   PHQ-2/PHQ-9 Depression Screening   Little Interest or Pleasure in Doing Things 0-->not at all   Feeling Down, Depressed or Hopeless 0-->not at all   PHQ-9: Brief Depression Severity Measure Score 0       Health Habits and Functional and Cognitive Screenin/5/2024     8:40 AM   Functional & Cognitive Status   Do you have difficulty preparing food and eating? No   Do you have difficulty bathing yourself, getting dressed or grooming yourself? No   Do you have difficulty using the toilet? No   Do you have difficulty moving around from place to place? No   Do you have trouble with steps or getting out of a bed or a chair? No   Current Diet Well Balanced Diet   Dental Exam Up to date   Eye Exam Not up to date   Exercise (times per week) 6 times per week   Current Exercises Include Treadmill;Yard Work;Pickleball;Cardiovascular Workout;Hiking;Swim Aerobics Class   Do you need help using the phone?  No   Are you deaf or do you have serious difficulty hearing?  No   Do you need help to go to places out of walking distance? No   Do you need help shopping? No   Do you need help preparing meals?  No   Do you need help with housework?  No   Do you need help with laundry? No   Do you need help taking your medications? No   Do you need help managing money? No   Do you ever drive or ride in a car without wearing a seat belt? No   Have you felt unusual stress, anger or loneliness in the last month? No   Who do you live with? Spouse   If you need help, do you have trouble finding someone available to you? No   Have you been  "bothered in the last four weeks by sexual problems? No   Do you have difficulty concentrating, remembering or making decisions? No       Age-appropriate Screening Schedule:  Refer to the list below for future screening recommendations based on patient's age, sex and/or medical conditions. Orders for these recommended tests are listed in the plan section. The patient has been provided with a written plan.    Health Maintenance   Topic Date Due    AAA SCREEN (ONE-TIME)  Never done    INFLUENZA VACCINE  03/31/2024 (Originally 8/1/2023)    LIPID PANEL  12/28/2024    ANNUAL WELLNESS VISIT  01/05/2025    BMI FOLLOWUP  01/05/2025    TDAP/TD VACCINES (3 - Td or Tdap) 08/05/2026    COLORECTAL CANCER SCREENING  03/05/2030    HEPATITIS C SCREENING  Completed    Pneumococcal Vaccine 65+  Completed    COVID-19 Vaccine  Discontinued    ZOSTER VACCINE  Discontinued                  CMS Preventative Services Quick Reference  Risk Factors Identified During Encounter  None Identified  The above risks/problems have been discussed with the patient.  Pertinent information has been shared with the patient in the After Visit Summary.  An After Visit Summary and PPPS were made available to the patient.    Follow Up:   Next Medicare Wellness visit to be scheduled in 1 year.       Additional E&M Note during same encounter follows:  Patient has multiple medical problems which are significant and separately identifiable that require additional work above and beyond the Medicare Wellness Visit.      Chief Complaint  Medicare Wellness-subsequent    Subjective          Sharath Montero is also being seen today for HTN, HLD, and prediabetes. He also has CAD. Patient is without complaint today.     He exercises regularly. Due for flu and RSV immunizations, but not interested.     Objective   Vital Signs:  /70 (BP Location: Right arm, Patient Position: Sitting, Cuff Size: Adult)   Pulse 57   Resp 16   Ht 175.3 cm (69.02\")   Wt 89.2 kg " (196 lb 9.6 oz)   SpO2 98%   BMI 29.02 kg/m²     Physical Exam  Vitals reviewed.   Constitutional:       Appearance: Normal appearance.   Pulmonary:      Effort: Pulmonary effort is normal.   Neurological:      Mental Status: He is alert and oriented to person, place, and time.   Psychiatric:         Mood and Affect: Mood normal.         Behavior: Behavior normal.          The following data was reviewed by: Александр Campbell MD on 01/05/2024:  Common labs          6/23/2023    09:31 12/28/2023    08:43   Common Labs   Glucose 116  110    BUN 15  16    Creatinine 1.02  1.11    Sodium 140  141    Potassium 4.7  4.6    Chloride 102  104    Calcium 9.9  9.6    Total Protein 7.3  7.0    Albumin 4.9  4.7    Total Bilirubin 0.6  0.4    Alkaline Phosphatase 63  65    AST (SGOT) 24  24    ALT (SGPT) 36  36    WBC 5.97     Hemoglobin 15.7     Hematocrit 47.5     Platelets 166     Total Cholesterol 128  113    Triglycerides 103  101    HDL Cholesterol 54  54    LDL Cholesterol  55  40    Hemoglobin A1C 6.40  6.10    Microalbumin, Urine  6.9    PSA  2.560      Data reviewed : 1/5/2024         Assessment and Plan   Diagnoses and all orders for this visit:    1. Essential hypertension (Primary)  Assessment & Plan:  Hypertension is  at goal with lifestyle modifications and lisinopril. No changes to regimen at this time .  Weight loss.  Regular aerobic exercise.  Continue current medications.  Blood pressure will be reassessed at the next regular appointment.    Orders:  -     Comprehensive Metabolic Panel; Future  -     Microalbumin / Creatinine Urine Ratio - Urine, Clean Catch; Future    2. Hyperlipidemia, unspecified hyperlipidemia type  Assessment & Plan:  Lipid abnormalities are  well controlled with lifestyle modifications and medications . LDL 40.    Lipids will be reassessed in 1 year.    Orders:  -     Lipid Panel; Future  -     Apolipoprotein B; Future    3. Prediabetes  Assessment & Plan:  A1c improving. Patient is  apparently enrolled in a nationwide program to assist with this. He has been working on better food choices and exercise. Praised efforts to date. Will check A1c again in one year.     Orders:  -     Hemoglobin A1c; Future    4. Overweight (BMI 25.0-29.9)  Assessment & Plan:  Patient's (Body mass index is 29.02 kg/m².) indicates that they are overweight with health conditions that include hypertension, coronary heart disease, dyslipidemias, and prediabetes  . Weight is improving with lifestyle modifications. BMI is is above average; BMI management plan is completed. We discussed portion control, increasing exercise, joining a fitness center or start home based exercise program, and management of depression/anxiety/stress to control compensatory eating.       5. Encounter for abdominal aortic aneurysm (AAA) screening  -     [place] aaa screen limited; Future             Follow Up   No follow-ups on file.  Patient was given instructions and counseling regarding his condition or for health maintenance advice. Please see specific information pulled into the AVS if appropriate.            Pt adm w/severe abdominal pain, pt w/pancreatitis. Per chart review pt has been binge-drinking x 3 days PTA. PTA pt had multiple episodes of non-bloody, non-bilious vomiting. Pt transferred to ICU for severe alcohol withdrawal symptoms. Pt is lethargic and disoriented per chart. Pt is maintained on ACO Isolation precautions. Pt is receiving IVF. Weight per previous admission (12/2019) was 66 kg (reflecting 5.5 kg gain x 8 months).

## 2024-01-05 NOTE — ASSESSMENT & PLAN NOTE
A1c improving. Patient is apparently enrolled in a nationwide program to assist with this. He has been working on better food choices and exercise. Praised efforts to date. Will check A1c again in one year.

## 2024-01-05 NOTE — ASSESSMENT & PLAN NOTE
Lipid abnormalities are  well controlled with lifestyle modifications and medications . LDL 40.    Lipids will be reassessed in 1 year.

## 2024-01-05 NOTE — ASSESSMENT & PLAN NOTE
Patient's (Body mass index is 29.02 kg/m².) indicates that they are overweight with health conditions that include hypertension, coronary heart disease, dyslipidemias, and prediabetes  . Weight is improving with lifestyle modifications. BMI is is above average; BMI management plan is completed. We discussed portion control, increasing exercise, joining a fitness center or start home based exercise program, and management of depression/anxiety/stress to control compensatory eating.

## 2024-01-05 NOTE — ASSESSMENT & PLAN NOTE
Hypertension is  at goal with lifestyle modifications and lisinopril. No changes to regimen at this time .  Weight loss.  Regular aerobic exercise.  Continue current medications.  Blood pressure will be reassessed at the next regular appointment.

## 2024-01-19 ENCOUNTER — HOSPITAL ENCOUNTER (OUTPATIENT)
Dept: ULTRASOUND IMAGING | Facility: HOSPITAL | Age: 69
Discharge: HOME | End: 2024-01-19
Admitting: INTERNAL MEDICINE
Payer: MEDICARE

## 2024-01-19 DIAGNOSIS — Z13.6 ENCOUNTER FOR ABDOMINAL AORTIC ANEURYSM (AAA) SCREENING: ICD-10-CM

## 2024-01-19 PROCEDURE — 76706 US ABDL AORTA SCREEN AAA: CPT

## 2024-03-04 ENCOUNTER — HOSPITAL ENCOUNTER (OUTPATIENT)
Dept: CARDIOLOGY | Facility: HOSPITAL | Age: 69
Discharge: HOME | End: 2024-03-04
Payer: MEDICARE

## 2024-03-04 ENCOUNTER — OFFICE VISIT (OUTPATIENT)
Dept: CARDIOLOGY | Facility: CLINIC | Age: 69
End: 2024-03-04
Payer: MEDICARE

## 2024-03-04 VITALS — SYSTOLIC BLOOD PRESSURE: 118 MMHG | DIASTOLIC BLOOD PRESSURE: 70 MMHG | HEART RATE: 62 BPM

## 2024-03-04 VITALS
WEIGHT: 203.8 LBS | HEART RATE: 55 BPM | BODY MASS INDEX: 30.18 KG/M2 | DIASTOLIC BLOOD PRESSURE: 74 MMHG | SYSTOLIC BLOOD PRESSURE: 138 MMHG | HEIGHT: 69 IN

## 2024-03-04 DIAGNOSIS — I10 ESSENTIAL HYPERTENSION: ICD-10-CM

## 2024-03-04 DIAGNOSIS — R06.02 SOB (SHORTNESS OF BREATH): ICD-10-CM

## 2024-03-04 DIAGNOSIS — I25.10 CORONARY ARTERY DISEASE INVOLVING NATIVE CORONARY ARTERY OF NATIVE HEART WITHOUT ANGINA PECTORIS: ICD-10-CM

## 2024-03-04 DIAGNOSIS — R94.30 ABNORMAL CARDIAC FUNCTION TEST: ICD-10-CM

## 2024-03-04 DIAGNOSIS — I25.10 CORONARY ARTERY DISEASE INVOLVING NATIVE CORONARY ARTERY OF NATIVE HEART WITHOUT ANGINA PECTORIS: Primary | ICD-10-CM

## 2024-03-04 LAB
ANION GAP SERPL CALCULATED.3IONS-SCNC: 12 MMOL/L (ref 5–15)
APTT PPP: 26.8 SECONDS (ref 22.7–35.4)
BASOPHILS # BLD AUTO: 0.03 10*3/MM3 (ref 0–0.2)
BASOPHILS NFR BLD AUTO: 0.5 % (ref 0–1.5)
BUN SERPL-MCNC: 17 MG/DL (ref 8–23)
BUN/CREAT SERPL: 16.2 (ref 7–25)
CALCIUM SPEC-SCNC: 9.7 MG/DL (ref 8.6–10.5)
CHLORIDE SERPL-SCNC: 103 MMOL/L (ref 98–107)
CO2 SERPL-SCNC: 25 MMOL/L (ref 22–29)
CREAT SERPL-MCNC: 1.05 MG/DL (ref 0.76–1.27)
DEPRECATED RDW RBC AUTO: 41.7 FL (ref 37–54)
EGFRCR SERPLBLD CKD-EPI 2021: 78.3 ML/MIN/1.73
EOSINOPHIL # BLD AUTO: 0.19 10*3/MM3 (ref 0–0.4)
EOSINOPHIL NFR BLD AUTO: 2.9 % (ref 0.3–6.2)
ERYTHROCYTE [DISTWIDTH] IN BLOOD BY AUTOMATED COUNT: 12 % (ref 12.3–15.4)
GLUCOSE SERPL-MCNC: 91 MG/DL (ref 65–99)
HCT VFR BLD AUTO: 45 % (ref 37.5–51)
HGB BLD-MCNC: 15.1 G/DL (ref 13–17.7)
IMM GRANULOCYTES # BLD AUTO: 0.03 10*3/MM3 (ref 0–0.05)
IMM GRANULOCYTES NFR BLD AUTO: 0.5 % (ref 0–0.5)
INR PPP: 1.03 (ref 0.9–1.1)
LYMPHOCYTES # BLD AUTO: 1.56 10*3/MM3 (ref 0.7–3.1)
LYMPHOCYTES NFR BLD AUTO: 24 % (ref 19.6–45.3)
MCH RBC QN AUTO: 32.1 PG (ref 26.6–33)
MCHC RBC AUTO-ENTMCNC: 33.6 G/DL (ref 31.5–35.7)
MCV RBC AUTO: 95.7 FL (ref 79–97)
MONOCYTES # BLD AUTO: 0.81 10*3/MM3 (ref 0.1–0.9)
MONOCYTES NFR BLD AUTO: 12.4 % (ref 5–12)
NEUTROPHILS NFR BLD AUTO: 3.89 10*3/MM3 (ref 1.7–7)
NEUTROPHILS NFR BLD AUTO: 59.7 % (ref 42.7–76)
NRBC BLD AUTO-RTO: 0 /100 WBC (ref 0–0.2)
PLATELET # BLD AUTO: 158 10*3/MM3 (ref 140–450)
PMV BLD AUTO: 11.1 FL (ref 6–12)
POTASSIUM SERPL-SCNC: 4.2 MMOL/L (ref 3.5–5.2)
PROTHROMBIN TIME: 13.6 SECONDS (ref 11.7–14.2)
RBC # BLD AUTO: 4.7 10*6/MM3 (ref 4.14–5.8)
SODIUM SERPL-SCNC: 140 MMOL/L (ref 136–145)
WBC NRBC COR # BLD AUTO: 6.51 10*3/MM3 (ref 3.4–10.8)

## 2024-03-04 PROCEDURE — 93018 CV STRESS TEST I&R ONLY: CPT | Performed by: INTERNAL MEDICINE

## 2024-03-04 PROCEDURE — 3078F DIAST BP <80 MM HG: CPT | Performed by: INTERNAL MEDICINE

## 2024-03-04 PROCEDURE — 85025 COMPLETE CBC W/AUTO DIFF WBC: CPT

## 2024-03-04 PROCEDURE — 80048 BASIC METABOLIC PNL TOTAL CA: CPT

## 2024-03-04 PROCEDURE — 36415 COLL VENOUS BLD VENIPUNCTURE: CPT

## 2024-03-04 PROCEDURE — 85610 PROTHROMBIN TIME: CPT

## 2024-03-04 PROCEDURE — 3075F SYST BP GE 130 - 139MM HG: CPT | Performed by: INTERNAL MEDICINE

## 2024-03-04 PROCEDURE — 93017 CV STRESS TEST TRACING ONLY: CPT

## 2024-03-04 PROCEDURE — 93016 CV STRESS TEST SUPVJ ONLY: CPT

## 2024-03-04 PROCEDURE — 85730 THROMBOPLASTIN TIME PARTIAL: CPT

## 2024-03-04 PROCEDURE — 99214 OFFICE O/P EST MOD 30 MIN: CPT | Performed by: INTERNAL MEDICINE

## 2024-03-04 RX ORDER — LANOLIN ALCOHOL/MO/W.PET/CERES
400 CREAM (GRAM) TOPICAL DAILY
COMMUNITY

## 2024-03-04 RX ORDER — NITROGLYCERIN 0.4 MG/1
TABLET SUBLINGUAL
Qty: 100 TABLET | Refills: 11 | Status: SHIPPED | OUTPATIENT
Start: 2024-03-04

## 2024-03-04 NOTE — H&P (VIEW-ONLY)
Subjective:        Sharath Montero is a 68 y.o. male who here for follow up    CC  Follow-up coronary artery disease hypertension shortness of breath  HPI  68 years old male with known history of coronary artery disease CABG has been complaining of the significant chest pains and tightness in the chest     Problems Addressed this Visit          Cardiac and Vasculature    Abnormal cardiac function test    Essential hypertension    Coronary artery disease involving native coronary artery of native heart without angina pectoris - Primary    Relevant Medications    nitroglycerin (NITROSTAT) 0.4 MG SL tablet    Other Relevant Orders    Treadmill Stress Test (Completed)    Case Request Cath Lab: Left Heart Cath (Completed)    Basic Metabolic Panel (Completed)    CBC & Differential (Completed)    Protime-INR (Completed)    aPTT (Completed)       Pulmonary and Pneumonias    SOB (shortness of breath)    Relevant Orders    Treadmill Stress Test (Completed)    Case Request Cath Lab: Left Heart Cath (Completed)    Basic Metabolic Panel (Completed)    CBC & Differential (Completed)    Protime-INR (Completed)    aPTT (Completed)     Diagnoses         Codes Comments    Coronary artery disease involving native coronary artery of native heart without angina pectoris    -  Primary ICD-10-CM: I25.10  ICD-9-CM: 414.01     SOB (shortness of breath)     ICD-10-CM: R06.02  ICD-9-CM: 786.05     Abnormal cardiac function test     ICD-10-CM: R94.30  ICD-9-CM: 794.30     Essential hypertension     ICD-10-CM: I10  ICD-9-CM: 401.9           .    The following portions of the patient's history were reviewed and updated as appropriate: allergies, current medications, past family history, past medical history, past social history, past surgical history and problem list.    Past Medical History:   Diagnosis Date    Chest pain     possibly due to pericarditis    Congenital heart disease 7.11.19    Coronary artery disease     mild to moderate     "Heart disease     Heart disease 08/05/2016    History of positive PPD     as a child     History of rotator cuff tear     LEFT 1988    Hyperlipidemia     Hypertension      reports that he quit smoking about 39 years ago. His smoking use included cigarettes. He started smoking about 53 years ago. He has a 13.9 pack-year smoking history. He has never used smokeless tobacco. He reports that he does not currently use alcohol after a past usage of about 2.0 standard drinks of alcohol per week. He reports that he does not use drugs.   Family History   Problem Relation Age of Onset    Other Mother 65        cabg    Heart disease Mother     Heart disease Father     Other Father 45        cabg    Heart attack Father     Other Sister         BLOOD DISEASE     Malig Hyperthermia Neg Hx        Review of Systems  Constitutional: No wt loss, fever, fatigue  Gastrointestinal: No nausea, abdominal pain  Behavioral/Psych: No insomnia or anxiety   Cardiovascular chest pain and tightness in the chest  Objective:       Physical Exam           Physical Exam  /74 (BP Location: Left arm)   Pulse 55   Ht 175.3 cm (69\")   Wt 92.4 kg (203 lb 12.8 oz)   BMI 30.10 kg/m²     General appearance: No acute changes   Eyes: Sclerae conjunctivae normal, pupils reactive   HENT: Atraumatic; oropharynx clear with moist mucous membranes and no mucosal ulcerations;  Neck: Trachea midline; NECK, supple, no thyromegaly or lymphadenopathy   Lungs: Normal size and shape, normal breath sounds, equal distribution of air, no rales and rhonchi   CV: S1-S2 regular, no murmurs, no rub, no gallop   Abdomen: Soft, nontender; no masses , no abnormal abdominal sounds   Extremities: No deformity , normal color , no peripheral edema   Skin: Normal temperature, turgor and texture; no rash, ulcers  Psych: Appropriate affect, alert and oriented to person, place and time           Procedures      Echocardiogram:    Results for orders placed in visit on " 02/28/22    Adult Transthoracic Echo Complete W/ Cont if Necessary Per Protocol    Interpretation Summary  · Calculated left ventricular EF = 57.5% Estimated left ventricular EF was in agreement with the calculated left ventricular EF.  · Left ventricular diastolic function was normal.  · There is calcification of the aortic valve.  · There is no evidence of pericardial effusion        No current facility-administered medications for this visit.  No current outpatient medications on file.    Facility-Administered Medications Ordered in Other Visits:     sodium chloride 0.9 % flush 10 mL, 10 mL, Intravenous, Q12H, Guera Perry MD    sodium chloride 0.9 % infusion, 75 mL/hr, Intravenous, Continuous, Guera Perry MD, Last Rate: 75 mL/hr at 03/06/24 0824, 75 mL/hr at 03/06/24 0824   Assessment:                Plan:          ICD-10-CM ICD-9-CM   1. Coronary artery disease involving native coronary artery of native heart without angina pectoris  I25.10 414.01   2. SOB (shortness of breath)  R06.02 786.05   3. Abnormal cardiac function test  R94.30 794.30   4. Essential hypertension  I10 401.9     1. Coronary artery disease involving native coronary artery of native heart without angina pectoris  Considering the patient's symptoms as well as clinical situation and  EKG findings, along with cardiac risk factors, ischemic workup is necessary to rule out ischemic cardiomyopathy, stress induced arrhythmias, and functional capacity for diagnosis as well as prognostic consideration    - Treadmill Stress Test  - Case Request Cath Lab: Left Heart Cath  - Basic Metabolic Panel; Future  - CBC & Differential; Future  - Protime-INR; Future  - aPTT; Future    2. SOB (shortness of breath)  Significant angina pectoris  - Treadmill Stress Test  - Case Request Cath Lab: Left Heart Cath  - Basic Metabolic Panel; Future  - CBC & Differential; Future  - Protime-INR; Future  - aPTT; Future    3. Abnormal cardiac function  test  Will need further workup with a heart catheter    Procedure, risks and options of cardiac cath explained to pt INCLUDING BUT NOT LIMITED TO MI, STROKE, DEATH, INFECTION HAEMORRHAGE, . Pt understands well and agrees with no further questions.      4. Essential hypertension  Continue current treatment      Ett    Will need cath  COUNSELING:    Sharath Gary was given to patient for the following topics: diagnostic results, risk factor reductions, impressions, risks and benefits of treatment options and importance of treatment compliance .       SMOKING COUNSELING:        Dictated using Dragon dictation

## 2024-03-05 LAB
BH CV STRESS BP STAGE 1: NORMAL
BH CV STRESS BP STAGE 2: NORMAL
BH CV STRESS BP STAGE 3: NORMAL
BH CV STRESS DURATION MIN STAGE 1: 3
BH CV STRESS DURATION MIN STAGE 2: 3
BH CV STRESS DURATION MIN STAGE 3: 3
BH CV STRESS DURATION MIN STAGE 4: 0
BH CV STRESS DURATION SEC STAGE 1: 0
BH CV STRESS DURATION SEC STAGE 2: 0
BH CV STRESS DURATION SEC STAGE 3: 0
BH CV STRESS DURATION SEC STAGE 4: 51
BH CV STRESS GRADE STAGE 1: 10
BH CV STRESS GRADE STAGE 2: 12
BH CV STRESS GRADE STAGE 3: 14
BH CV STRESS GRADE STAGE 4: 16
BH CV STRESS HR STAGE 1: 86
BH CV STRESS HR STAGE 2: 101
BH CV STRESS HR STAGE 3: 119
BH CV STRESS HR STAGE 4: 131
BH CV STRESS METS STAGE 1: 4.6
BH CV STRESS METS STAGE 2: 7.1
BH CV STRESS METS STAGE 3: 10.2
BH CV STRESS METS STAGE 4: 10.7
BH CV STRESS PROTOCOL 1: NORMAL
BH CV STRESS RECOVERY BP: NORMAL MMHG
BH CV STRESS RECOVERY HR: 76 BPM
BH CV STRESS SPEED STAGE 1: 1.7
BH CV STRESS SPEED STAGE 2: 2.5
BH CV STRESS SPEED STAGE 3: 3.4
BH CV STRESS SPEED STAGE 4: 4.2
BH CV STRESS STAGE 1: 1
BH CV STRESS STAGE 2: 2
BH CV STRESS STAGE 3: 3
BH CV STRESS STAGE 4: 4
MAXIMAL PREDICTED HEART RATE: 154 BPM
PERCENT MAX PREDICTED HR: 86.36 %
STRESS BASELINE BP: NORMAL MMHG
STRESS BASELINE HR: 64 BPM
STRESS PERCENT HR: 102 %
STRESS POST ESTIMATED WORKLOAD: 10.8 METS
STRESS POST EXERCISE DUR MIN: 9 MIN
STRESS POST EXERCISE DUR SEC: 51 SEC
STRESS POST PEAK BP: NORMAL MMHG
STRESS POST PEAK HR: 133 BPM
STRESS TARGET HR: 131 BPM

## 2024-03-06 ENCOUNTER — HOSPITAL ENCOUNTER (OUTPATIENT)
Facility: HOSPITAL | Age: 69
Discharge: HOME | End: 2024-03-07
Attending: INTERNAL MEDICINE | Admitting: INTERNAL MEDICINE
Payer: MEDICARE

## 2024-03-06 DIAGNOSIS — I51.9 HEART DISEASE: ICD-10-CM

## 2024-03-06 DIAGNOSIS — J30.89 ENVIRONMENTAL AND SEASONAL ALLERGIES: ICD-10-CM

## 2024-03-06 DIAGNOSIS — R06.02 SOB (SHORTNESS OF BREATH): ICD-10-CM

## 2024-03-06 DIAGNOSIS — R94.30 ABNORMAL CARDIAC FUNCTION TEST: ICD-10-CM

## 2024-03-06 DIAGNOSIS — I25.10 CORONARY ARTERY DISEASE INVOLVING NATIVE CORONARY ARTERY OF NATIVE HEART WITHOUT ANGINA PECTORIS: ICD-10-CM

## 2024-03-06 DIAGNOSIS — I10 ESSENTIAL HYPERTENSION: ICD-10-CM

## 2024-03-06 DIAGNOSIS — E66.3 OVERWEIGHT (BMI 25.0-29.9): Primary | ICD-10-CM

## 2024-03-06 DIAGNOSIS — R07.2 PRECORDIAL CHEST PAIN: ICD-10-CM

## 2024-03-06 DIAGNOSIS — R73.03 PREDIABETES: ICD-10-CM

## 2024-03-06 DIAGNOSIS — Z92.89 HISTORY OF POSITIVE PPD: ICD-10-CM

## 2024-03-06 DIAGNOSIS — M25.811 IMPINGEMENT OF RIGHT SHOULDER: ICD-10-CM

## 2024-03-06 DIAGNOSIS — R07.2 PRECORDIAL PAIN: ICD-10-CM

## 2024-03-06 PROBLEM — R07.9 CHEST PAIN: Status: ACTIVE | Noted: 2024-03-06

## 2024-03-06 LAB
ACT BLD: 168 SECONDS (ref 82–152)
ACT BLD: 201 SECONDS (ref 82–152)
ACT BLD: 260 SECONDS (ref 82–152)
GLUCOSE BLDC GLUCOMTR-MCNC: 147 MG/DL (ref 70–130)

## 2024-03-06 PROCEDURE — G0378 HOSPITAL OBSERVATION PER HR: HCPCS

## 2024-03-06 PROCEDURE — 85347 COAGULATION TIME ACTIVATED: CPT

## 2024-03-06 PROCEDURE — C1887 CATHETER, GUIDING: HCPCS | Performed by: INTERNAL MEDICINE

## 2024-03-06 PROCEDURE — 92972 PERQ TRLUML CORONRY LITHOTRP: CPT | Performed by: INTERNAL MEDICINE

## 2024-03-06 PROCEDURE — 25810000003 SODIUM CHLORIDE 0.9 % SOLUTION: Performed by: INTERNAL MEDICINE

## 2024-03-06 PROCEDURE — 93459 L HRT ART/GRFT ANGIO: CPT | Performed by: INTERNAL MEDICINE

## 2024-03-06 PROCEDURE — C1725 CATH, TRANSLUMIN NON-LASER: HCPCS | Performed by: INTERNAL MEDICINE

## 2024-03-06 PROCEDURE — C1769 GUIDE WIRE: HCPCS | Performed by: INTERNAL MEDICINE

## 2024-03-06 PROCEDURE — C9600 PERC DRUG-EL COR STENT SING: HCPCS | Performed by: INTERNAL MEDICINE

## 2024-03-06 PROCEDURE — C1761: HCPCS | Performed by: INTERNAL MEDICINE

## 2024-03-06 PROCEDURE — 25010000002 MIDAZOLAM PER 1 MG: Performed by: INTERNAL MEDICINE

## 2024-03-06 PROCEDURE — 82948 REAGENT STRIP/BLOOD GLUCOSE: CPT

## 2024-03-06 PROCEDURE — C1874 STENT, COATED/COV W/DEL SYS: HCPCS | Performed by: INTERNAL MEDICINE

## 2024-03-06 PROCEDURE — 25010000002 FENTANYL CITRATE (PF) 50 MCG/ML SOLUTION: Performed by: INTERNAL MEDICINE

## 2024-03-06 PROCEDURE — C1894 INTRO/SHEATH, NON-LASER: HCPCS | Performed by: INTERNAL MEDICINE

## 2024-03-06 PROCEDURE — 25010000002 HEPARIN (PORCINE) PER 1000 UNITS: Performed by: INTERNAL MEDICINE

## 2024-03-06 PROCEDURE — 25510000001 IOPAMIDOL PER 1 ML: Performed by: INTERNAL MEDICINE

## 2024-03-06 DEVICE — XIENCE SKYPOINT™ EVEROLIMUS ELUTING CORONARY STENT SYSTEM 2.50 MM X 12 MM / RAPID-EXCHANGE
Type: IMPLANTABLE DEVICE | Site: CORONARY | Status: FUNCTIONAL
Brand: XIENCE SKYPOINT™

## 2024-03-06 DEVICE — XIENCE SKYPOINT™ EVEROLIMUS ELUTING CORONARY STENT SYSTEM 2.50 MM X 08 MM / RAPID-EXCHANGE
Type: IMPLANTABLE DEVICE | Site: CORONARY | Status: FUNCTIONAL
Brand: XIENCE SKYPOINT™

## 2024-03-06 RX ORDER — NITROGLYCERIN 0.4 MG/1
0.4 TABLET SUBLINGUAL
Status: DISCONTINUED | OUTPATIENT
Start: 2024-03-06 | End: 2024-03-06 | Stop reason: SDUPTHER

## 2024-03-06 RX ORDER — ACETAMINOPHEN 325 MG/1
650 TABLET ORAL EVERY 4 HOURS PRN
Status: DISCONTINUED | OUTPATIENT
Start: 2024-03-06 | End: 2024-03-07 | Stop reason: HOSPADM

## 2024-03-06 RX ORDER — NITROGLYCERIN 0.4 MG/1
0.4 TABLET SUBLINGUAL
Status: DISCONTINUED | OUTPATIENT
Start: 2024-03-06 | End: 2024-03-07 | Stop reason: HOSPADM

## 2024-03-06 RX ORDER — LISINOPRIL 5 MG/1
5 TABLET ORAL DAILY
Status: DISCONTINUED | OUTPATIENT
Start: 2024-03-07 | End: 2024-03-07 | Stop reason: HOSPADM

## 2024-03-06 RX ORDER — THIAMINE HCL 50 MG
50 TABLET ORAL DAILY
COMMUNITY

## 2024-03-06 RX ORDER — MIDAZOLAM HYDROCHLORIDE 1 MG/ML
INJECTION INTRAMUSCULAR; INTRAVENOUS
Status: DISCONTINUED | OUTPATIENT
Start: 2024-03-06 | End: 2024-03-06 | Stop reason: HOSPADM

## 2024-03-06 RX ORDER — HEPARIN SODIUM 1000 [USP'U]/ML
INJECTION, SOLUTION INTRAVENOUS; SUBCUTANEOUS
Status: DISCONTINUED | OUTPATIENT
Start: 2024-03-06 | End: 2024-03-06 | Stop reason: HOSPADM

## 2024-03-06 RX ORDER — SODIUM CHLORIDE 0.9 % (FLUSH) 0.9 %
10 SYRINGE (ML) INJECTION EVERY 12 HOURS SCHEDULED
Status: DISCONTINUED | OUTPATIENT
Start: 2024-03-06 | End: 2024-03-07 | Stop reason: HOSPADM

## 2024-03-06 RX ORDER — LANOLIN ALCOHOL/MO/W.PET/CERES
400 CREAM (GRAM) TOPICAL DAILY
Status: DISCONTINUED | OUTPATIENT
Start: 2024-03-07 | End: 2024-03-07 | Stop reason: HOSPADM

## 2024-03-06 RX ORDER — HYDRALAZINE HYDROCHLORIDE 20 MG/ML
10 INJECTION INTRAMUSCULAR; INTRAVENOUS EVERY 6 HOURS PRN
Status: DISCONTINUED | OUTPATIENT
Start: 2024-03-06 | End: 2024-03-07 | Stop reason: HOSPADM

## 2024-03-06 RX ORDER — ASPIRIN 81 MG/1
81 TABLET ORAL EVERY MORNING
Status: DISCONTINUED | OUTPATIENT
Start: 2024-03-07 | End: 2024-03-07 | Stop reason: HOSPADM

## 2024-03-06 RX ORDER — ATORVASTATIN CALCIUM 20 MG/1
20 TABLET, FILM COATED ORAL NIGHTLY
Status: DISCONTINUED | OUTPATIENT
Start: 2024-03-06 | End: 2024-03-07 | Stop reason: HOSPADM

## 2024-03-06 RX ORDER — ASCORBIC ACID 500 MG
1000 TABLET ORAL DAILY
Status: DISCONTINUED | OUTPATIENT
Start: 2024-03-07 | End: 2024-03-07 | Stop reason: HOSPADM

## 2024-03-06 RX ORDER — FENTANYL CITRATE 50 UG/ML
INJECTION, SOLUTION INTRAMUSCULAR; INTRAVENOUS
Status: DISCONTINUED | OUTPATIENT
Start: 2024-03-06 | End: 2024-03-06 | Stop reason: HOSPADM

## 2024-03-06 RX ORDER — ASPIRIN 81 MG/1
TABLET, CHEWABLE ORAL
Status: DISCONTINUED | OUTPATIENT
Start: 2024-03-06 | End: 2024-03-06 | Stop reason: HOSPADM

## 2024-03-06 RX ORDER — MULTIVIT WITH MINERALS/LUTEIN
1000 TABLET ORAL DAILY
COMMUNITY

## 2024-03-06 RX ORDER — SODIUM CHLORIDE 9 MG/ML
75 INJECTION, SOLUTION INTRAVENOUS CONTINUOUS
Status: DISCONTINUED | OUTPATIENT
Start: 2024-03-06 | End: 2024-03-07 | Stop reason: HOSPADM

## 2024-03-06 RX ORDER — LIDOCAINE HYDROCHLORIDE 20 MG/ML
INJECTION, SOLUTION INFILTRATION; PERINEURAL
Status: DISCONTINUED | OUTPATIENT
Start: 2024-03-06 | End: 2024-03-06 | Stop reason: HOSPADM

## 2024-03-06 RX ADMIN — SODIUM CHLORIDE 75 ML/HR: 9 INJECTION, SOLUTION INTRAVENOUS at 08:24

## 2024-03-06 RX ADMIN — Medication 10 ML: at 21:30

## 2024-03-06 RX ADMIN — METOPROLOL TARTRATE 25 MG: 25 TABLET, FILM COATED ORAL at 21:42

## 2024-03-06 RX ADMIN — TICAGRELOR 90 MG: 90 TABLET ORAL at 21:42

## 2024-03-06 RX ADMIN — ATORVASTATIN CALCIUM 20 MG: 20 TABLET, FILM COATED ORAL at 21:43

## 2024-03-06 NOTE — Clinical Note
First balloon inflation max pressure = 6 charlene. First balloon inflation duration = 10 seconds. Second inflation of balloon - Max pressure = 6 charlene. 2nd Inflation of balloon - Duration = 10 seconds. 2nd inflation was done at 10:06 EST. Third inflation of balloon - Max pressure = 6 charlene. 3rd Inflation of balloon - Duration = 10 seconds. 3rd inflation was done at 10:06 EST. Fourth inflation of balloon - Max pressure = 6 charlene. 4th Inflation of b alloon - Duration = 10 seconds. 4th inflation was done at 10:07 EST.

## 2024-03-06 NOTE — Clinical Note
A 6 fr sheath was  inserted using micropuncture technique into the right femoral artery. Sheath insertion not delayed. Angio RFA

## 2024-03-06 NOTE — Clinical Note
First balloon inflation max pressure = 12 charlene. First balloon inflation duration = 10 seconds. Second inflation of balloon - Max pressure = 12 charlene. 2nd Inflation of balloon - Duration = 10 seconds.

## 2024-03-06 NOTE — Clinical Note
First balloon inflation max pressure = 10 charlene. First balloon inflation duration = 10 seconds. Second inflation of balloon - Max pressure = 14 charlene. 2nd Inflation of balloon - Duration = 10 seconds. 2nd inflation was done at 09:47 EST.

## 2024-03-06 NOTE — CONSULTS
Met with patient to discuss the benefits of cardiac rehab. Pt reported that he attended our program back in 2019 and he has continued his exercising.States exercises 600 minutes a week and is in a prediabetic study where that watch his BS and weight.Will discuss with APRN at follow up visit if he needs to attend. Provided phase II information along with the contact information for cardiac rehab here at [facility]. Pt will call if interested in scheduling.

## 2024-03-06 NOTE — Clinical Note
First balloon inflation max pressure = 16 charlene. First balloon inflation duration = 10 seconds. Second inflation of balloon - Max pressure = 18 charlene. 2nd Inflation of balloon - Duration = 10 seconds. 2nd inflation was done at 09:51 EST.

## 2024-03-06 NOTE — Clinical Note
First balloon inflation max pressure = 4 charlene. First balloon inflation duration = 5 seconds. Second inflation of balloon - Max pressure = 4 charlene. 2nd Inflation of balloon - Duration = 5 seconds. 2nd inflation was done at 10:22 EST.

## 2024-03-06 NOTE — Clinical Note
First balloon inflation max pressure = 9 charlene. First balloon inflation duration = 10 seconds. Second inflation of balloon - Max pressure = 9 charlene. 2nd Inflation of balloon - Duration = 10 seconds.

## 2024-03-06 NOTE — DISCHARGE INSTRUCTIONS
[facility]  [address] [place] [address]    Coronary Angiogram with Stent (Femoral Approach) After Care    Refer to this sheet in the next few weeks. These instructions provide you with information on caring for yourself after your procedure. Your health care provider may also give you more specific instructions. Your treatment has been planned according to current medical practices, but problems sometimes occur. Call your health care provider if you have any problems or questions after your procedure.    WHAT TO EXPECT AFTER THE PROCEDURE    The insertion site may be tender for a few days after your procedure.  Minor discomfort or tenderness and a small bump at the catheter insertion site.  The bump should decrease in size and tenderness within 1 to 2 weeks.     HOME CARE INSTRUCTIONS      Take medicines only as directed by your health care provider. Blood thinners may be prescribed after your procedure to improve blood flow through the stent.  Metformin or any medications containing Metformin should not be taken for 48 hours after your procedure.    Cardiac Rehab may or may not be ordered.  Please consult with your physician.   Do not apply powder or lotion to the site.    Check your insertion site every day for redness, swelling, or fluid leaking from the insertion site.    Increase your fluid intake for the next 2 days.    Hold direct pressure over the site when you cough, sneeze, laugh or change positions.  Do this for the next 2 days.    Avoid strenuous activity as directed by your physician.  Follow instructions about when you can drive and return to work as directed by your physician.     Do not take baths, swim, or use a hot tub until your health care provider approves.   You may shower 24 hours after the procedure. Remove the bandage (dressing) and gently wash the site with plain soap and water.  Gently pat the site dry.  Do not rub the insertion area with a washcloth or towel.  You  may apply a band aid daily for 2 days if desired.   Limit your activity for the first 48 hours.  Do not bend, squat, or lift anything over 20lb. (9 kg) or as directed by your health care provider.  However, we recommend lifting nothing heavier than a gallon of milk.   Do not operate machinery or power tools for 24 hours.  A responsible adult should be with you for the first 24 hours after you arrive home. Do not make any important legal decisions or sign legal papers for 24 hours.  Do not drink alcohol for 24 hours.    Eat a heart-healthy diet. This should include plenty of fresh fruits and vegetables. Meat should be lean cuts. Avoid the following types of food:    Food that is high in salt.    Canned or highly processed food.    Food that is high in saturated fat or sugar.    Fried food.    Make any other lifestyle changes recommended by your health care provider. This may include:    Not using any tobacco products including cigarettes, chewing tobacco, or electronic cigarettes.   Managing your weight.    Getting regular exercise.    Managing your blood pressure.    Limiting your alcohol intake.    Managing other health problems, such as diabetes.    If you need an MRI after your heart stent was placed, be sure to tell the health care provider who orders the MRI that you have a heart stent.    Keep all follow-up visits as directed by your health care provider.      Call your doctor if:    You have heavy bleeding from the site, lie down flat, hold manual pressure and call 911 immediately.     You develop chest pain, shortness of breath, feel faint, or pass out.  You have bleeding, swelling larger than a walnut, or drainage from the catheter insertion site.  You develop pain, discoloration, coldness, numbness, tingling, or severe bruising in the leg that held the catheter.  You develop bleeding from any other place such as from the bowels.   You have a fever > 101 or chills.    Call Your Doctor If:     You have any  symptoms of a stroke.  Remember BE FAST  B-balance. Sudden trouble walking or loss of balance.  E-eyes.  Sudden changes in how you see or a sudden onset of a very bad headache.   F-face. Sudden weakness or loss of feeling of the face or facial droop on one side.   A-arms Sudden weakness or numbness in one arm.  One arm drifts down if they are both held out in front of you. This happens suddenly and usually on one side of the body.  S-speech.  Sudden trouble speaking, slurred speech or trouble understanding what people are saying.   T-time  Time to call emergency services.  Write down the symptoms and the time they started.    MAKE SURE YOU:  Understand these instructions.  Will watch your condition.  Will get help right away if you are not doing well or get worse.

## 2024-03-06 NOTE — Clinical Note
First balloon inflation max pressure = 12 charlene. First balloon inflation duration = 10 seconds. Second inflation of balloon - Max pressure = 12 charlene. 2nd Inflation of balloon - Duration = 10 seconds. 2nd inflation was done at 09:54 EST. Third inflation of balloon - Max pressure = 14 charlene. 3rd Inflation of balloon - Duration = 10 seconds. 3rd inflation was done at 09:54 EST. Fourth inflation of balloon - Max pressure = 14 charlene. 4th Inflation  of balloon - Duration = 10 seconds. 4th inflation was done at 09:55 EST.

## 2024-03-07 ENCOUNTER — READMISSION MANAGEMENT (OUTPATIENT)
Dept: CALL CENTER | Facility: HOSPITAL | Age: 69
End: 2024-03-07
Payer: MEDICARE

## 2024-03-07 VITALS
TEMPERATURE: 97.6 F | SYSTOLIC BLOOD PRESSURE: 142 MMHG | WEIGHT: 200 LBS | OXYGEN SATURATION: 98 % | RESPIRATION RATE: 18 BRPM | HEIGHT: 69 IN | BODY MASS INDEX: 29.62 KG/M2 | DIASTOLIC BLOOD PRESSURE: 78 MMHG | HEART RATE: 61 BPM

## 2024-03-07 LAB
ANION GAP SERPL CALCULATED.3IONS-SCNC: 13.9 MMOL/L (ref 5–15)
BUN SERPL-MCNC: 13 MG/DL (ref 8–23)
BUN/CREAT SERPL: 14 (ref 7–25)
CALCIUM SPEC-SCNC: 8.9 MG/DL (ref 8.6–10.5)
CHLORIDE SERPL-SCNC: 103 MMOL/L (ref 98–107)
CHOLEST SERPL-MCNC: 112 MG/DL (ref 0–200)
CO2 SERPL-SCNC: 21.1 MMOL/L (ref 22–29)
CREAT SERPL-MCNC: 0.93 MG/DL (ref 0.76–1.27)
DEPRECATED RDW RBC AUTO: 41.6 FL (ref 37–54)
EGFRCR SERPLBLD CKD-EPI 2021: 90.6 ML/MIN/1.73
ERYTHROCYTE [DISTWIDTH] IN BLOOD BY AUTOMATED COUNT: 12.1 % (ref 12.3–15.4)
GLUCOSE BLDC GLUCOMTR-MCNC: 111 MG/DL (ref 70–130)
GLUCOSE SERPL-MCNC: 111 MG/DL (ref 65–99)
HBA1C MFR BLD: 6.3 % (ref 4.8–5.6)
HCT VFR BLD AUTO: 41.9 % (ref 37.5–51)
HDLC SERPL-MCNC: 40 MG/DL (ref 40–60)
HGB BLD-MCNC: 14 G/DL (ref 13–17.7)
LDLC SERPL CALC-MCNC: 50 MG/DL (ref 0–100)
LDLC/HDLC SERPL: 1.17 {RATIO}
MCH RBC QN AUTO: 31.6 PG (ref 26.6–33)
MCHC RBC AUTO-ENTMCNC: 33.4 G/DL (ref 31.5–35.7)
MCV RBC AUTO: 94.6 FL (ref 79–97)
PLATELET # BLD AUTO: 155 10*3/MM3 (ref 140–450)
PMV BLD AUTO: 10.7 FL (ref 6–12)
POTASSIUM SERPL-SCNC: 4.1 MMOL/L (ref 3.5–5.2)
QT INTERVAL: 438 MS
QTC INTERVAL: 442 MS
RBC # BLD AUTO: 4.43 10*6/MM3 (ref 4.14–5.8)
SODIUM SERPL-SCNC: 138 MMOL/L (ref 136–145)
TRIGL SERPL-MCNC: 126 MG/DL (ref 0–150)
VLDLC SERPL-MCNC: 22 MG/DL (ref 5–40)
WBC NRBC COR # BLD AUTO: 6.3 10*3/MM3 (ref 3.4–10.8)

## 2024-03-07 PROCEDURE — 99238 HOSP IP/OBS DSCHRG MGMT 30/<: CPT | Performed by: NURSE PRACTITIONER

## 2024-03-07 PROCEDURE — 83036 HEMOGLOBIN GLYCOSYLATED A1C: CPT | Performed by: NURSE PRACTITIONER

## 2024-03-07 PROCEDURE — 80061 LIPID PANEL: CPT | Performed by: NURSE PRACTITIONER

## 2024-03-07 PROCEDURE — 80048 BASIC METABOLIC PNL TOTAL CA: CPT | Performed by: NURSE PRACTITIONER

## 2024-03-07 PROCEDURE — G0378 HOSPITAL OBSERVATION PER HR: HCPCS

## 2024-03-07 PROCEDURE — 82948 REAGENT STRIP/BLOOD GLUCOSE: CPT

## 2024-03-07 PROCEDURE — 93005 ELECTROCARDIOGRAM TRACING: CPT | Performed by: NURSE PRACTITIONER

## 2024-03-07 PROCEDURE — 85027 COMPLETE CBC AUTOMATED: CPT | Performed by: NURSE PRACTITIONER

## 2024-03-07 RX ORDER — CLOPIDOGREL BISULFATE 75 MG/1
150 TABLET ORAL ONCE
Status: COMPLETED | OUTPATIENT
Start: 2024-03-07 | End: 2024-03-07

## 2024-03-07 RX ORDER — CLOPIDOGREL BISULFATE 75 MG/1
150 TABLET ORAL ONCE
Status: DISCONTINUED | OUTPATIENT
Start: 2024-03-07 | End: 2024-03-07

## 2024-03-07 RX ORDER — ATORVASTATIN CALCIUM 40 MG/1
40 TABLET, FILM COATED ORAL NIGHTLY
Qty: 45 TABLET | Refills: 3 | Status: SHIPPED | OUTPATIENT
Start: 2024-03-07

## 2024-03-07 RX ORDER — CLOPIDOGREL BISULFATE 75 MG/1
75 TABLET ORAL DAILY
Status: DISCONTINUED | OUTPATIENT
Start: 2024-03-08 | End: 2024-03-07 | Stop reason: HOSPADM

## 2024-03-07 RX ORDER — CLOPIDOGREL BISULFATE 75 MG/1
75 TABLET ORAL DAILY
Qty: 30 TABLET | Refills: 12 | Status: SHIPPED | OUTPATIENT
Start: 2024-03-08

## 2024-03-07 RX ADMIN — TICAGRELOR 90 MG: 90 TABLET ORAL at 08:35

## 2024-03-07 RX ADMIN — Medication 400 MCG: at 11:15

## 2024-03-07 RX ADMIN — OXYCODONE HYDROCHLORIDE AND ACETAMINOPHEN 1000 MG: 500 TABLET ORAL at 11:15

## 2024-03-07 RX ADMIN — LISINOPRIL 5 MG: 5 TABLET ORAL at 11:15

## 2024-03-07 RX ADMIN — CLOPIDOGREL BISULFATE 150 MG: 75 TABLET, FILM COATED ORAL at 13:21

## 2024-03-07 RX ADMIN — METOPROLOL TARTRATE 25 MG: 25 TABLET, FILM COATED ORAL at 08:35

## 2024-03-07 RX ADMIN — Medication 50 MG: at 08:35

## 2024-03-07 RX ADMIN — ASPIRIN 81 MG: 81 TABLET, COATED ORAL at 08:35

## 2024-03-07 RX ADMIN — Medication 10 ML: at 08:36

## 2024-03-07 NOTE — DISCHARGE SUMMARY
[facility]  Physician Discharge Summary    Patient Identification:  Name: Sharath Montero  Age: 68 y.o.  Sex: male  :  1955  MRN: [id number]    Admit date: 3/6/2024    Discharge date and time:  3/7/24 at 1347      Admitting Physician: Guera Perry MD     Discharge Physician: MYLES Hare      Discharge Diagnoses:   Active Hospital Problems    Diagnosis     **SOB (shortness of breath)     Chest pain     Coronary artery disease involving native coronary artery of native heart without angina pectoris        Discharged Condition: stable    Hospital Course:      See full full details in H&P.  Sharath Montero is a 68-year-old male who was admitted as a direct admit for chest pain with known CAD.  He underwent a cardiac cath by Dr. Perry.  Details report pending.  At discharge she is in stable condition.  Blood pressure and heart rate stable.  Discussed in detail antiplatelet therapy. Discharged in stable condition.     Consults:   IP CONSULT TO INTERNAL MEDICINE    Discharge Exam:  General: No acute distress   Skin: Warm and dry, no diaphoresis noted.cath site s/s infection or hematoma.   EYES: Eom normal and no conjunctival drainage   HEENT: external ear and nose normal; oral mucosa moist   Neck: Supple; no carotid bruits; no JVD   Heart: S1S2 regular rate and rhythm; no murmurs; no gallop or rub appreciated   Pulmonary: Respirations regular, unlabored at rest, bilateral breath sounds have good air entry throughout all lung fields; no crackles, rubs or wheezes auscultated.     GI: Soft, non-tender, non-distended, positive bowel sounds  No hepatosplenomegaly   Extremities: Bilateral lower extremities have no pre-tibial pitting edema; DP/PT pulses are palpable   Neurological: Alert and oriented x 3; no neuro deficits          LABS:          Results from last 7 days   Lab Units 24  0314   SODIUM mmol/L 138   POTASSIUM mmol/L 4.1   BUN mg/dL 13   CREATININE  mg/dL 0.93   CALCIUM mg/dL 8.9     @LABRCNTbnp@  Results from last 7 days   Lab Units 03/07/24 0314 03/04/24  1355   WBC 10*3/mm3 6.30 6.51   HEMOGLOBIN g/dL 14.0 15.1   HEMATOCRIT % 41.9 45.0   PLATELETS 10*3/mm3 155 158     Results from last 7 days   Lab Units 03/04/24  1355   INR  1.03   APTT seconds 26.8     Results from last 7 days   Lab Units 03/07/24  0314   CHOLESTEROL mg/dL 112   TRIGLYCERIDES mg/dL 126   HDL CHOL mg/dL 40   LDL CHOL mg/dL 50     Disposition:  Home    Discharge Medications:      Discharge Medications        New Medications        Instructions Start Date   atorvastatin 40 MG tablet  Commonly known as: LIPITOR  Replaces: simvastatin 40 MG tablet   40 mg, Oral, Nightly      clopidogrel 75 MG tablet  Commonly known as: PLAVIX   75 mg, Oral, Daily   Start Date: March 8, 2024            Continue These Medications        Instructions Start Date   aspirin 81 MG EC tablet   81 mg, Oral, Every Morning      ezetimibe 10 MG tablet  Commonly known as: Zetia   10 mg, Oral, Daily      folic acid 400 MCG tablet  Commonly known as: FOLVITE   400 mcg, Oral, Daily      lisinopril 5 MG tablet  Commonly known as: PRINIVIL,ZESTRIL   5 mg, Oral, Daily      metoprolol tartrate 25 MG tablet  Commonly known as: LOPRESSOR   25 mg, Oral, Every 12 Hours      nitroglycerin 0.4 MG SL tablet  Commonly known as: NITROSTAT   1 under the tongue as needed for angina, may repeat q5mins for up three doses      vitamin B-1 50 MG tablet   50 mg, Oral, Daily      vitamin C 250 MG tablet  Commonly known as: ASCORBIC ACID   1,000 mg, Oral, Daily             Stop These Medications      simvastatin 40 MG tablet  Commonly known as: ZOCOR  Replaced by: atorvastatin 40 MG tablet                         Discharge Home Instructions:  1. Follow up with Dr. Perry on 3/18/24 at 1:30 pm.  2.  Follow-up with your primary care physician in 1 week.  Please call for an appointment.  3.  Take all medications as prescribed.  4. The  "importance of taking dual antiplatelets for one year  has been explained, risk of stent thrombosis leading to the acute MI, which carries high morbidity and mortality has been explained. Discontinuation or interruptions of these medications should be under the strict guidance of appropriate health professional.    5. Routine post cardiac catheterization/PCI discharge home care instructions:    1. No submerging procedure site below water for 7-10 days.  2. No lifting objects greater than 1 lbs for 3 days.  3. If groin site used, avoid climbing several flights of stairs or sitting for longer than 2 hours at a time for the next 24 hours.   4. Monitor puncture site for bleeding and/or knots;. If bleeding should occur at the groin site: lie flat, apply pressure and return to the ER. If bleeding should occur at the wrist site, apply pressure and return to the ER.  5.  You may apply a DRY Band-Aid over the puncture site if needed. Do not apply any lotions, salves or ointments to site.  6. No driving for 3 days.  7. Return to ER for recurrent symptoms.  8. No smoking.  9. Take all medications as prescribed.      Signed:  Tati Pablo, APRN  3/7/2024  13:46 EST      EMR Dragon/Transcription:   \"Dictated utilizing Dragon dictation\".     "

## 2024-03-07 NOTE — OUTREACH NOTE
Prep Survey      Flowsheet Row Responses   [facility] patient discharged from? [place]   Is LACE score < 7 ? Yes   Eligibility [facility]   Date of Admission 03/06/24   Date of Discharge 03/07/24   Discharge Disposition Home or Self Care   Discharge diagnosis SOB (shortness of breath), heart cath   Does the patient have one of the following disease processes/diagnoses(primary or secondary)? Other   Does the patient have Home health ordered? No   Is there a DME ordered? No   Prep survey completed? Yes            Arianna Feng Registered Nurse

## 2024-03-08 ENCOUNTER — TRANSITIONAL CARE MANAGEMENT TELEPHONE ENCOUNTER (OUTPATIENT)
Dept: CALL CENTER | Facility: HOSPITAL | Age: 69
End: 2024-03-08
Payer: MEDICARE

## 2024-03-08 NOTE — OUTREACH NOTE
Call Center TCM Note      Flowsheet Row Responses   [facility] patient discharged from? [place]   Does the patient have one of the following disease processes/diagnoses(primary or secondary)? Other   TCM attempt successful? No   Unsuccessful attempts Attempt 2   Call Status Left message   Comments Cardiology 3/18/24   Does the patient have an appointment with their PCP within 7-14 days of discharge? Yes  [3/11/2024  3:00 PM]            Maki Comer RN    3/8/2024, 12:18 EST

## 2024-03-08 NOTE — OUTREACH NOTE
Call Center TCM Note      Flowsheet Row Responses   [facility] patient discharged from? [place]   Does the patient have one of the following disease processes/diagnoses(primary or secondary)? Other   TCM attempt successful? No   Unsuccessful attempts Attempt 1   Call Status Left message   Comments Cardiology 3/18/24   Does the patient have an appointment with their PCP within 7-14 days of discharge? Yes  [3/11/2024 at 3:00 PM]            Maki Comer RN    3/8/2024, 09:21 EST

## 2024-03-09 ENCOUNTER — TRANSITIONAL CARE MANAGEMENT TELEPHONE ENCOUNTER (OUTPATIENT)
Dept: CALL CENTER | Facility: HOSPITAL | Age: 69
End: 2024-03-09
Payer: MEDICARE

## 2024-03-09 LAB — ACT BLD: 304 SECONDS (ref 82–152)

## 2024-03-09 NOTE — OUTREACH NOTE
Call Center TCM Note      Flowsheet Row Responses   [facility] patient discharged from? [place]   Does the patient have one of the following disease processes/diagnoses(primary or secondary)? Other   TCM attempt successful? Yes   Call start time 1048   Call end time 1050   Discharge diagnosis SOB (shortness of breath), heart cath   Meds reviewed with patient/caregiver? Yes   Is the patient having any side effects they believe may be caused by any medication additions or changes? No   Does the patient have all medications ordered at discharge? Yes   Is the patient taking all medications as directed (includes completed medication regime)? Yes   Comments Hospital f/u appt. with PCP 3/11/24 @ 3pm.   Does the patient have an appointment with their PCP within 7-14 days of discharge? Yes   Has home health visited the patient within 72 hours of discharge? N/A   Psychosocial issues? No   Did the patient receive a copy of their discharge instructions? Yes   Nursing interventions Reviewed instructions with patient   What is the patient's perception of their health status since discharge? Improving   Is the patient/caregiver able to teach back signs and symptoms related to disease process for when to call PCP? Yes   Is the patient/caregiver able to teach back signs and symptoms related to disease process for when to call 911? Yes   Is the patient/caregiver able to teach back the hierarchy of who to call/visit for symptoms/problems? PCP, Specialist, Home health nurse, Urgent Care, ED, 911 Yes   If the patient is a current smoker, are they able to teach back resources for cessation? Not a smoker   TCM call completed? Yes   Call end time 1050   Would this patient benefit from a Referral to Amb Social Work? No   Is the patient interested in additional calls from an ambulatory ? No            Laxmi Xavier RN    3/9/2024, 10:52 EST

## 2024-03-11 ENCOUNTER — OFFICE VISIT (OUTPATIENT)
Dept: FAMILY MEDICINE CLINIC | Facility: CLINIC | Age: 69
End: 2024-03-11
Payer: MEDICARE

## 2024-03-11 ENCOUNTER — TELEPHONE (OUTPATIENT)
Dept: CARDIOLOGY | Facility: CLINIC | Age: 69
End: 2024-03-11

## 2024-03-11 VITALS
OXYGEN SATURATION: 96 % | SYSTOLIC BLOOD PRESSURE: 136 MMHG | DIASTOLIC BLOOD PRESSURE: 64 MMHG | HEIGHT: 69 IN | TEMPERATURE: 97.8 F | HEART RATE: 74 BPM | WEIGHT: 201.8 LBS | BODY MASS INDEX: 29.89 KG/M2 | RESPIRATION RATE: 16 BRPM

## 2024-03-11 DIAGNOSIS — R06.02 SOB (SHORTNESS OF BREATH): Primary | ICD-10-CM

## 2024-03-11 DIAGNOSIS — I10 ESSENTIAL HYPERTENSION: ICD-10-CM

## 2024-03-11 RX ORDER — LISINOPRIL 5 MG/1
5 TABLET ORAL DAILY
Qty: 90 TABLET | Refills: 3 | Status: SHIPPED | OUTPATIENT
Start: 2024-03-11

## 2024-03-11 NOTE — TELEPHONE ENCOUNTER
"  Caller: Sharath Montero \"Elijah\"    Relationship to patient: Self    Best call back number: [phone]    Type of visit: HOSPITAL FU    Requested date: THIS WEEK IF POSSIBLE    If rescheduling, when is the original appointment: 3.18.24     Additional notes:PT IS REQUESTING TO MOVE HOSPITAL FU APPT ON 3.18.24 UP TO SOMETIME THIS WEEK IF AT ALL POSSIBLE. PLEASE ADVISE AND CALL PT BACK TO DISCUSS.           "

## 2024-03-11 NOTE — ASSESSMENT & PLAN NOTE
Hypertension is stable and controlled.  BP stable today.   Requesting medication refill. Continue lisinopril 5 mg daily & metoprolol 25 mg BID. Medication refilled.  Continue current treatment regimen.  Dietary sodium restriction.  Weight loss.  Regular aerobic exercise.  Stop smoking.  Ambulatory blood pressure monitoring.  Blood pressure will be reassessed in 3 months.

## 2024-03-11 NOTE — PROGRESS NOTES
Chief Complaint   Patient presents with    Hospital Follow Up Visit     Released last Thursday with stent plmt            History of Present Illness:  Patient is 68-year-old male with known history of CAD s/p CABG who is here today for hospital follow-up.  He was admitted and managed for CAD status post multivessel stent placement 5 days ago.  Currently on DAPT, lisinopril, metoprolol and atorvastatin.  Today, he still recovering gradually, feeling a little bit lethargic but still experiencing dyspnea with mild to moderate exertion that is not usual for him.  No further chest pain.  Last echo 2 years ago showed normal left ventricular function with EF of 57.5%, mild MV/TV regurgitation.    PMH:   Outpatient Medications Prior to Visit   Medication Sig Dispense Refill    aspirin 81 MG EC tablet Take 1 tablet by mouth Every Morning.      atorvastatin (LIPITOR) 40 MG tablet Take 1 tablet by mouth Every Night. 45 tablet 3    clopidogrel (PLAVIX) 75 MG tablet Take 1 tablet by mouth Daily. 30 tablet 12    ezetimibe (Zetia) 10 MG tablet Take 1 tablet by mouth Daily. 90 tablet 3    folic acid (FOLVITE) 400 MCG tablet Take 1 tablet by mouth Daily.      metoprolol tartrate (LOPRESSOR) 25 MG tablet Take 1 tablet by mouth Every 12 (Twelve) Hours. 180 tablet 3    nitroglycerin (NITROSTAT) 0.4 MG SL tablet 1 under the tongue as needed for angina, may repeat q5mins for up three doses 100 tablet 11    Thiamine HCl (vitamin B-1) 50 MG tablet Take 1 tablet by mouth Daily.      vitamin C (ASCORBIC ACID) 250 MG tablet Take 4 tablets by mouth Daily.      lisinopril (PRINIVIL,ZESTRIL) 5 MG tablet Take 1 tablet by mouth Daily. 90 tablet 3     No facility-administered medications prior to visit.      No Known Allergies  Past Surgical History:   Procedure Laterality Date    CARDIAC CATHETERIZATION      angioplasty with stent 1998    CARDIAC CATHETERIZATION  04/06/2015    CARDIAC CATHETERIZATION N/A 07/03/2019    Procedure: Left Heart Cath;   Surgeon: Guera Perry MD;  Location: [facility] CATH INVASIVE LOCATION;  Service: Cardiology    CARDIAC CATHETERIZATION N/A 07/03/2019    Procedure: Coronary angiography;  Surgeon: Guera Perry MD;  Location: [facility] CATH [facility]VASIVE LOC[facility];  Service: Cardiology    CARDIAC CATHETERIZATION N/A 07/03/2019    Procedure: Left ventriculography;  Surgeon: Guera Perry MD;  Location: [facility] CATH [facility]VASIVE LOC[facility];  Service: Cardiology    CARDIAC CATHETERIZATION N/A 08/29/2019    Procedure: Left Heart Cath;  Surgeon: Guera Perry MD;  Location: [facility] CATH INVASIVE LOC[facility];  Service: Cardiology    CARDIAC CATHETERIZATION N/A 08/29/2019    Procedure: Coronary angiography;  Surgeon: Guera Perry MD;  Location: [facility] CATH [facility]VASIVE LOC[facility];  Service: Cardiology    CARDIAC CATHETERIZATION N/A 08/29/2019    Procedure: Left ventriculography;  Surgeon: Guera Perry MD;  Location: [facility] CATH INVASIVE LOCATION;  Service: Cardiology    CAROTID STENT  4/1/1996    COLONOSCOPY N/A 03/05/2020    Procedure: COLONOSCOPY to cecum and t.i. with hot snare polypectomyies, biopsies;  Surgeon: Keith Joseph Jr., MD;  Location: [facility] ENDOSCOPY;  Service: General;  Laterality: N/A;  pre- + cologuard test  post- polyps, focal proctitis, anal skin tags    CORONARY ARTERY BYPASS GRAFT N/A 07/11/2019    Procedure: TYLOR STERNOTOMY CORONARY ARTERY BYPASS GRAFT TIMES 4 USING LEFT INTERNAL MAMMARY ARTERY AND RIGHT GREATER SAPHENOUS VEIN GRAFT PER ENDOSCOPIC VEIN HARVESTING AND PRP;  Surgeon: Gael Mccall MD;  Location: [facility] MAIN OR;  Service: Cardiothoracic    CORONARY STENT PLACEMENT      SKIN CANCER EXCISION N/A 2013    TONSILLECTOMY      VEIN SURGERY  7.11.19    right leg    WRIST SURGERY Left      family history includes Heart attack in his father; Heart disease in his father and mother; Other in his sister; Other (age of onset: 45) in his father; Other (age of onset: 65) in his  "mother.   reports that he quit smoking about 39 years ago. His smoking use included cigarettes. He started smoking about 53 years ago. He has a 13.9 pack-year smoking history. He has been exposed to tobacco smoke. He has never used smokeless tobacco. He reports that he does not currently use alcohol after a past usage of about 2.0 standard drinks of alcohol per week. He reports that he does not use drugs.     /64 (BP Location: Left arm, Patient Position: Sitting, Cuff Size: Adult)   Pulse 74   Temp 97.8 °F (36.6 °C) (Oral)   Resp 16   Ht 175.3 cm (69.02\")   Wt 91.5 kg (201 lb 12.8 oz)   SpO2 96%   BMI 29.79 kg/m²   Physical Exam  Constitutional:       Appearance: Normal appearance.   HENT:      Head: Normocephalic and atraumatic.   Cardiovascular:      Rate and Rhythm: Normal rate and regular rhythm.      Pulses: Normal pulses.      Heart sounds: Normal heart sounds. No murmur heard.  Pulmonary:      Effort: Pulmonary effort is normal. No respiratory distress.      Breath sounds: Normal breath sounds. No wheezing or rales.   Musculoskeletal:      Right lower leg: No edema.      Left lower leg: No edema.   Skin:     General: Skin is warm.   Neurological:      Mental Status: He is alert and oriented to person, place, and time.          The following data was reviewed by: Laura Sanchez MD on 03/11/2024:  Common labs          12/28/2023    08:43 3/4/2024    13:55 3/7/2024    03:14   Common Labs   Glucose 110  91  111    BUN 16  17  13    Creatinine 1.11  1.05  0.93    Sodium 141  140  138    Potassium 4.6  4.2  4.1    Chloride 104  103  103    Calcium 9.6  9.7  8.9    Total Protein 7.0      Albumin 4.7      Total Bilirubin 0.4      Alkaline Phosphatase 65      AST (SGOT) 24      ALT (SGPT) 36      WBC  6.51  6.30    Hemoglobin  15.1  14.0    Hematocrit  45.0  41.9    Platelets  158  155    Total Cholesterol   112    Total Cholesterol 113      Triglycerides 101   126    HDL Cholesterol 54   40    LDL " Cholesterol  40   50    Hemoglobin A1C 6.10   6.30    Microalbumin, Urine 6.9      PSA 2.560             Diagnoses and all orders for this visit:    1. SOB (shortness of breath) (Primary)  Assessment & Plan:  Patient still experiencing shortness of breath on exertion  Underwent cardiac catheterization with stent placement due to coronary artery disease  ECHO 2 years ago reviewed showed normal left ventricular function with EF of 57.5%, mild MV/TV regurgitation and normal RV function.  We will get repeat ECHO to rule out Heart failure secondary to ischemic cardiomyopathy.    Orders:  -     Adult Transthoracic Echo Complete W/ Cont if Necessary Per Protocol; Future    2. Essential hypertension  Assessment & Plan:  Hypertension is stable and controlled.  BP stable today.   Requesting medication refill. Continue lisinopril 5 mg daily & metoprolol 25 mg BID. Medication refilled.  Continue current treatment regimen.  Dietary sodium restriction.  Weight loss.  Regular aerobic exercise.  Stop smoking.  Ambulatory blood pressure monitoring.  Blood pressure will be reassessed in 3 months.    Orders:  -     lisinopril (PRINIVIL,ZESTRIL) 5 MG tablet; Take 1 tablet by mouth Daily.  Dispense: 90 tablet; Refill: 3             Return in about 3 months (around 6/11/2024) for Recheck.

## 2024-03-11 NOTE — ASSESSMENT & PLAN NOTE
Patient still experiencing shortness of breath on exertion  Underwent cardiac catheterization with stent placement due to coronary artery disease  ECHO 2 years ago reviewed showed normal left ventricular function with EF of 57.5%, mild MV/TV regurgitation and normal RV function.  We will get repeat ECHO to rule out Heart failure secondary to ischemic cardiomyopathy.

## 2024-03-14 ENCOUNTER — HOSPITAL ENCOUNTER (OUTPATIENT)
Dept: CARDIOLOGY | Facility: HOSPITAL | Age: 69
Discharge: HOME | End: 2024-03-14
Admitting: INTERNAL MEDICINE
Payer: MEDICARE

## 2024-03-14 ENCOUNTER — OFFICE VISIT (OUTPATIENT)
Dept: CARDIOLOGY | Facility: CLINIC | Age: 69
End: 2024-03-14
Payer: MEDICARE

## 2024-03-14 VITALS
SYSTOLIC BLOOD PRESSURE: 102 MMHG | BODY MASS INDEX: 29.47 KG/M2 | WEIGHT: 199 LBS | HEART RATE: 61 BPM | HEIGHT: 69 IN | DIASTOLIC BLOOD PRESSURE: 70 MMHG

## 2024-03-14 DIAGNOSIS — R07.82 INTERCOSTAL PAIN: ICD-10-CM

## 2024-03-14 DIAGNOSIS — I25.10 CORONARY ARTERY DISEASE INVOLVING NATIVE CORONARY ARTERY OF NATIVE HEART WITHOUT ANGINA PECTORIS: ICD-10-CM

## 2024-03-14 DIAGNOSIS — R06.02 SOB (SHORTNESS OF BREATH): ICD-10-CM

## 2024-03-14 DIAGNOSIS — I25.10 CORONARY ARTERY DISEASE INVOLVING NATIVE CORONARY ARTERY OF NATIVE HEART WITHOUT ANGINA PECTORIS: Primary | ICD-10-CM

## 2024-03-14 DIAGNOSIS — I10 ESSENTIAL HYPERTENSION: ICD-10-CM

## 2024-03-14 LAB
ALBUMIN SERPL-MCNC: 4.8 G/DL (ref 3.5–5.2)
ALBUMIN/GLOB SERPL: 1.8 G/DL
ALP SERPL-CCNC: 64 U/L (ref 39–117)
ALT SERPL W P-5'-P-CCNC: 26 U/L (ref 1–41)
ANION GAP SERPL CALCULATED.3IONS-SCNC: 8 MMOL/L (ref 5–15)
APTT PPP: 28.3 SECONDS (ref 22.7–35.4)
AST SERPL-CCNC: 24 U/L (ref 1–40)
BASOPHILS # BLD AUTO: 0.03 10*3/MM3 (ref 0–0.2)
BASOPHILS NFR BLD AUTO: 0.4 % (ref 0–1.5)
BILIRUB SERPL-MCNC: 0.4 MG/DL (ref 0–1.2)
BUN SERPL-MCNC: 19 MG/DL (ref 8–23)
BUN/CREAT SERPL: 12.9 (ref 7–25)
CALCIUM SPEC-SCNC: 9.6 MG/DL (ref 8.6–10.5)
CHLORIDE SERPL-SCNC: 103 MMOL/L (ref 98–107)
CO2 SERPL-SCNC: 26 MMOL/L (ref 22–29)
CREAT SERPL-MCNC: 1.47 MG/DL (ref 0.76–1.27)
DEPRECATED RDW RBC AUTO: 40.8 FL (ref 37–54)
EGFRCR SERPLBLD CKD-EPI 2021: 52.3 ML/MIN/1.73
EOSINOPHIL # BLD AUTO: 0.19 10*3/MM3 (ref 0–0.4)
EOSINOPHIL NFR BLD AUTO: 2.8 % (ref 0.3–6.2)
ERYTHROCYTE [DISTWIDTH] IN BLOOD BY AUTOMATED COUNT: 11.9 % (ref 12.3–15.4)
GLOBULIN UR ELPH-MCNC: 2.6 GM/DL
GLUCOSE SERPL-MCNC: 124 MG/DL (ref 65–99)
HCT VFR BLD AUTO: 46.1 % (ref 37.5–51)
HGB BLD-MCNC: 15.2 G/DL (ref 13–17.7)
IMM GRANULOCYTES # BLD AUTO: 0.02 10*3/MM3 (ref 0–0.05)
IMM GRANULOCYTES NFR BLD AUTO: 0.3 % (ref 0–0.5)
INR PPP: 1.06 (ref 0.9–1.1)
LYMPHOCYTES # BLD AUTO: 1.68 10*3/MM3 (ref 0.7–3.1)
LYMPHOCYTES NFR BLD AUTO: 25.2 % (ref 19.6–45.3)
MCH RBC QN AUTO: 30.8 PG (ref 26.6–33)
MCHC RBC AUTO-ENTMCNC: 33 G/DL (ref 31.5–35.7)
MCV RBC AUTO: 93.5 FL (ref 79–97)
MONOCYTES # BLD AUTO: 0.88 10*3/MM3 (ref 0.1–0.9)
MONOCYTES NFR BLD AUTO: 13.2 % (ref 5–12)
NEUTROPHILS NFR BLD AUTO: 3.87 10*3/MM3 (ref 1.7–7)
NEUTROPHILS NFR BLD AUTO: 58.1 % (ref 42.7–76)
NRBC BLD AUTO-RTO: 0 /100 WBC (ref 0–0.2)
PLATELET # BLD AUTO: 197 10*3/MM3 (ref 140–450)
PMV BLD AUTO: 10.8 FL (ref 6–12)
POTASSIUM SERPL-SCNC: 4.6 MMOL/L (ref 3.5–5.2)
PROT SERPL-MCNC: 7.4 G/DL (ref 6–8.5)
PROTHROMBIN TIME: 14 SECONDS (ref 11.7–14.2)
QT INTERVAL: 438 MS
QTC INTERVAL: 442 MS
RBC # BLD AUTO: 4.93 10*6/MM3 (ref 4.14–5.8)
SODIUM SERPL-SCNC: 137 MMOL/L (ref 136–145)
WBC NRBC COR # BLD AUTO: 6.67 10*3/MM3 (ref 3.4–10.8)

## 2024-03-14 PROCEDURE — 80053 COMPREHEN METABOLIC PANEL: CPT | Performed by: INTERNAL MEDICINE

## 2024-03-14 PROCEDURE — 85025 COMPLETE CBC W/AUTO DIFF WBC: CPT | Performed by: INTERNAL MEDICINE

## 2024-03-14 PROCEDURE — 36415 COLL VENOUS BLD VENIPUNCTURE: CPT | Performed by: INTERNAL MEDICINE

## 2024-03-14 PROCEDURE — 85730 THROMBOPLASTIN TIME PARTIAL: CPT | Performed by: INTERNAL MEDICINE

## 2024-03-14 PROCEDURE — 85610 PROTHROMBIN TIME: CPT | Performed by: INTERNAL MEDICINE

## 2024-03-14 NOTE — PROGRESS NOTES
Subjective:        Sharath Montero is a 68 y.o. male who here for follow up    CC  68 years old male with coronary artery disease  HPI  68 years old male with coronary artery disease as well as a chest pain and hypertension recent PCI also has significant stenosis of the ostial/proximal left anterior descending artery     Problems Addressed this Visit          Cardiac and Vasculature    Essential hypertension    Coronary artery disease involving native coronary artery of native heart without angina pectoris - Primary    Relevant Orders    Case Request Cath Lab: Percutaneous Coronary Intervention/ STENT TO PROXIMAL LAD (Completed)    CBC & Differential (Completed)    Comprehensive Metabolic Panel (Completed)    aPTT (Completed)    Protime-INR (Completed)    Chest pain    Relevant Orders    aPTT (Completed)       Pulmonary and Pneumonias    SOB (shortness of breath)     Diagnoses         Codes Comments    Coronary artery disease involving native coronary artery of native heart without angina pectoris    -  Primary ICD-10-CM: I25.10  ICD-9-CM: 414.01     Intercostal pain     ICD-10-CM: R07.82  ICD-9-CM: 786.59     SOB (shortness of breath)     ICD-10-CM: R06.02  ICD-9-CM: 786.05     Essential hypertension     ICD-10-CM: I10  ICD-9-CM: 401.9           .    The following portions of the patient's history were reviewed and updated as appropriate: allergies, current medications, past family history, past medical history, past social history, past surgical history and problem list.    Past Medical History:   Diagnosis Date    Asthma NO    Cancer NO    Chest pain     possibly due to pericarditis    CHF (congestive heart failure) NO    Clotting disorder NO    Congenital heart disease 7.11.19    COPD (chronic obstructive pulmonary disease) NO    Coronary artery disease     mild to moderate    Deep vein thrombosis NO    Diabetes mellitus NO    Heart disease     Heart disease 08/05/2016    Heart murmur NO    History of  "positive PPD     as a child     History of rotator cuff tear     LEFT 1988    Hyperlipidemia     Hypertension      reports that he quit smoking about 39 years ago. His smoking use included cigarettes. He started smoking about 53 years ago. He has a 13.9 pack-year smoking history. He has been exposed to tobacco smoke. He has never used smokeless tobacco. He reports that he does not currently use alcohol after a past usage of about 2.0 standard drinks of alcohol per week. He reports that he does not use drugs.   Family History   Problem Relation Age of Onset    Other Mother 65        cabg    Heart disease Mother     Heart disease Father     Other Father 45        cabg    Heart attack Father     Other Sister         BLOOD DISEASE     Malig Hyperthermia Neg Hx        Review of Systems  Constitutional: No wt loss, fever, fatigue  Gastrointestinal: No nausea, abdominal pain  Behavioral/Psych: No insomnia or anxiety   Cardiovascular chest pain and shortness of breath  Objective:       Physical Exam           Physical Exam  /70   Pulse 61   Ht 175.3 cm (69\")   Wt 90.3 kg (199 lb)   BMI 29.39 kg/m²     General appearance: No acute changes   Eyes: Sclerae conjunctivae normal, pupils reactive   HENT: Atraumatic; oropharynx clear with moist mucous membranes and no mucosal ulcerations;  Neck: Trachea midline; NECK, supple, no thyromegaly or lymphadenopathy   Lungs: Normal size and shape, normal breath sounds, equal distribution of air, no rales and rhonchi   CV: S1-S2 regular, no murmurs, no rub, no gallop   Abdomen: Soft, nontender; no masses , no abnormal abdominal sounds   Extremities: No deformity , normal color , no peripheral edema   Skin: Normal temperature, turgor and texture; no rash, ulcers  Psych: Appropriate affect, alert and oriented to person, place and time           Procedures      Echocardiogram:    Results for orders placed in visit on 02/28/22    Adult Transthoracic Echo Complete W/ Cont if Necessary " Per Protocol    Interpretation Summary  · Calculated left ventricular EF = 57.5% Estimated left ventricular EF was in agreement with the calculated left ventricular EF.  · Left ventricular diastolic function was normal.  · There is calcification of the aortic valve.  · There is no evidence of pericardial effusion        No current facility-administered medications for this visit.  No current outpatient medications on file.    Facility-Administered Medications Ordered in Other Visits:     sodium chloride 0.9 % flush 10 mL, 10 mL, Intravenous, PRN, Guera Perry MD    [START ON 3/21/2024] sodium chloride 0.9 % infusion, 75 mL/hr, Intravenous, Continuous, Guera Perry MD, Last Rate: 75 mL/hr at 03/20/24 0802, 75 mL/hr at 03/20/24 0802   Assessment:                Plan:          ICD-10-CM ICD-9-CM   1. Coronary artery disease involving native coronary artery of native heart without angina pectoris  I25.10 414.01   2. Intercostal pain  R07.82 786.59   3. SOB (shortness of breath)  R06.02 786.05   4. Essential hypertension  I10 401.9     1. Coronary artery disease involving native coronary artery of native heart without angina pectoris  We will proceed with angioplasty and stent to the proximal/ostial LAD  - Case Request Cath Lab: Percutaneous Coronary Intervention/ STENT TO PROXIMAL LAD  - CBC & Differential; Future  - Comprehensive Metabolic Panel; Future  - aPTT; Future  - Protime-INR; Future    2. Intercostal pain  Needs further evaluation  - aPTT; Future    3. SOB (shortness of breath)  Multifactorial    4. Essential hypertension  Patient understands importance of blood pressure check at home which patient does regularly and the blood pressures are well under control to the level of less than 140/90        Needs PCI / STENT PROXIMAL LAD  PROCEDURE, RISK AND OPTIONS EXPLAINED  COUNSELING:    Sharath Gayr was given to patient for the following topics: diagnostic results, risk factor  reductions, impressions, risks and benefits of treatment options and importance of treatment compliance .       SMOKING COUNSELING:        Dictated using Dragon dictation

## 2024-03-20 ENCOUNTER — HOSPITAL ENCOUNTER (OUTPATIENT)
Facility: HOSPITAL | Age: 69
Setting detail: HOSPITAL OUTPATIENT SURGERY
Discharge: HOME | End: 2024-03-20
Attending: INTERNAL MEDICINE | Admitting: INTERNAL MEDICINE
Payer: MEDICARE

## 2024-03-20 VITALS
DIASTOLIC BLOOD PRESSURE: 64 MMHG | HEIGHT: 69 IN | SYSTOLIC BLOOD PRESSURE: 141 MMHG | RESPIRATION RATE: 18 BRPM | OXYGEN SATURATION: 98 % | HEART RATE: 79 BPM | WEIGHT: 200 LBS | TEMPERATURE: 96.6 F | BODY MASS INDEX: 29.62 KG/M2

## 2024-03-20 DIAGNOSIS — I25.10 CORONARY ARTERY DISEASE INVOLVING NATIVE CORONARY ARTERY OF NATIVE HEART WITHOUT ANGINA PECTORIS: ICD-10-CM

## 2024-03-20 LAB
ACT BLD: 401 SECONDS (ref 82–152)
GLUCOSE BLDC GLUCOMTR-MCNC: 124 MG/DL (ref 70–130)

## 2024-03-20 PROCEDURE — C1894 INTRO/SHEATH, NON-LASER: HCPCS | Performed by: INTERNAL MEDICINE

## 2024-03-20 PROCEDURE — 25510000001 IOPAMIDOL PER 1 ML: Performed by: INTERNAL MEDICINE

## 2024-03-20 PROCEDURE — 25810000003 SODIUM CHLORIDE 0.9 % SOLUTION: Performed by: INTERNAL MEDICINE

## 2024-03-20 PROCEDURE — 85347 COAGULATION TIME ACTIVATED: CPT

## 2024-03-20 PROCEDURE — C1769 GUIDE WIRE: HCPCS | Performed by: INTERNAL MEDICINE

## 2024-03-20 PROCEDURE — 25010000002 MIDAZOLAM PER 1 MG: Performed by: INTERNAL MEDICINE

## 2024-03-20 PROCEDURE — C1725 CATH, TRANSLUMIN NON-LASER: HCPCS | Performed by: INTERNAL MEDICINE

## 2024-03-20 PROCEDURE — 92928 PRQ TCAT PLMT NTRAC ST 1 LES: CPT | Performed by: INTERNAL MEDICINE

## 2024-03-20 PROCEDURE — 25010000002 FENTANYL CITRATE (PF) 50 MCG/ML SOLUTION: Performed by: INTERNAL MEDICINE

## 2024-03-20 PROCEDURE — C1874 STENT, COATED/COV W/DEL SYS: HCPCS | Performed by: INTERNAL MEDICINE

## 2024-03-20 PROCEDURE — C1887 CATHETER, GUIDING: HCPCS | Performed by: INTERNAL MEDICINE

## 2024-03-20 PROCEDURE — 25010000002 HEPARIN (PORCINE) PER 1000 UNITS: Performed by: INTERNAL MEDICINE

## 2024-03-20 PROCEDURE — C9600 PERC DRUG-EL COR STENT SING: HCPCS | Performed by: INTERNAL MEDICINE

## 2024-03-20 PROCEDURE — 82948 REAGENT STRIP/BLOOD GLUCOSE: CPT

## 2024-03-20 DEVICE — XIENCE SKYPOINT™ EVEROLIMUS ELUTING CORONARY STENT SYSTEM 3.50 MM X 08 MM / RAPID-EXCHANGE
Type: IMPLANTABLE DEVICE | Site: CORONARY | Status: FUNCTIONAL
Brand: XIENCE SKYPOINT™

## 2024-03-20 RX ORDER — SODIUM CHLORIDE 9 MG/ML
75 INJECTION, SOLUTION INTRAVENOUS CONTINUOUS
Status: DISCONTINUED | OUTPATIENT
Start: 2024-03-21 | End: 2024-03-20 | Stop reason: HOSPADM

## 2024-03-20 RX ORDER — LIDOCAINE HYDROCHLORIDE 20 MG/ML
INJECTION, SOLUTION INFILTRATION; PERINEURAL
Status: DISCONTINUED | OUTPATIENT
Start: 2024-03-20 | End: 2024-03-20 | Stop reason: HOSPADM

## 2024-03-20 RX ORDER — FENTANYL CITRATE 50 UG/ML
INJECTION, SOLUTION INTRAMUSCULAR; INTRAVENOUS
Status: DISCONTINUED | OUTPATIENT
Start: 2024-03-20 | End: 2024-03-20 | Stop reason: HOSPADM

## 2024-03-20 RX ORDER — NITROGLYCERIN 0.4 MG/1
0.4 TABLET SUBLINGUAL
Status: DISCONTINUED | OUTPATIENT
Start: 2024-03-20 | End: 2024-03-20 | Stop reason: HOSPADM

## 2024-03-20 RX ORDER — MIDAZOLAM HYDROCHLORIDE 1 MG/ML
INJECTION INTRAMUSCULAR; INTRAVENOUS
Status: DISCONTINUED | OUTPATIENT
Start: 2024-03-20 | End: 2024-03-20 | Stop reason: HOSPADM

## 2024-03-20 RX ORDER — VERAPAMIL HYDROCHLORIDE 2.5 MG/ML
INJECTION, SOLUTION INTRAVENOUS
Status: DISCONTINUED | OUTPATIENT
Start: 2024-03-20 | End: 2024-03-20 | Stop reason: HOSPADM

## 2024-03-20 RX ORDER — HEPARIN SODIUM 1000 [USP'U]/ML
INJECTION, SOLUTION INTRAVENOUS; SUBCUTANEOUS
Status: DISCONTINUED | OUTPATIENT
Start: 2024-03-20 | End: 2024-03-20 | Stop reason: HOSPADM

## 2024-03-20 RX ORDER — SODIUM CHLORIDE 0.9 % (FLUSH) 0.9 %
10 SYRINGE (ML) INJECTION AS NEEDED
Status: DISCONTINUED | OUTPATIENT
Start: 2024-03-20 | End: 2024-03-20 | Stop reason: HOSPADM

## 2024-03-20 RX ORDER — ACETAMINOPHEN 325 MG/1
650 TABLET ORAL EVERY 4 HOURS PRN
Status: DISCONTINUED | OUTPATIENT
Start: 2024-03-20 | End: 2024-03-20 | Stop reason: HOSPADM

## 2024-03-20 RX ORDER — CLOPIDOGREL BISULFATE 75 MG/1
TABLET ORAL
Status: DISCONTINUED | OUTPATIENT
Start: 2024-03-20 | End: 2024-03-20 | Stop reason: HOSPADM

## 2024-03-20 RX ORDER — ASPIRIN 325 MG
TABLET ORAL
Status: DISCONTINUED | OUTPATIENT
Start: 2024-03-20 | End: 2024-03-20 | Stop reason: HOSPADM

## 2024-03-20 RX ADMIN — SODIUM CHLORIDE 75 ML/HR: 9 INJECTION, SOLUTION INTRAVENOUS at 08:02

## 2024-03-20 NOTE — Clinical Note
First balloon inflation max pressure = 6 charlene. First balloon inflation duration = 10 seconds. Second inflation of balloon - Max pressure = 6 charlene. 2nd Inflation of balloon - Duration = 10 seconds. Third inflation of balloon - Max pressure = 6 charlene. 3rd Inflation of balloon - Duration = 10 seconds.

## 2024-03-20 NOTE — Clinical Note
First balloon inflation max pressure = 12 charlene. First balloon inflation duration = 5 seconds. Second inflation of balloon - Max pressure = 12 charlene. 2nd Inflation of balloon - Duration = 5 seconds.

## 2024-03-20 NOTE — Clinical Note
Hemostasis started on the right radial artery. R-Band was used in achieving hemostasis. Radial compression device applied to vessel. Hemostasis achieved successfully. Closure device additional comment: TR band 14 cc air

## 2024-03-20 NOTE — DISCHARGE INSTRUCTIONS
[facility]  [address] [place] [address]    Coronary Angioplasty with or without  Stent (Radial Approach) After Care    Refer to this sheet in the next few weeks. These instructions provide you with information on caring for yourself after your procedure. Your health care provider may also give you more specific instructions. Your treatment has been planned according to current medical practices, but problems sometimes occur. Call your health care provider if you have any problems or questions after your procedure.       Home Care Instructions:  You may shower the day after the procedure. Remove the bandage (dressing) and gently wash the site with plain soap and water. Gently pat the site dry. You may apply a band aid daily for 2 days if desired.    Do not apply powder or lotion to the site.  Do not submerge the affected site in water for 3 to 5 days or until the site is completely healed.   Do not flex or bend at the wrist with affected arm for 24 hours.  Do not lift, push or pull anything over 5 pounds for 5 days after your procedure or as directed by your physician.  For a reference, a gallon of milk weighs 8 pounds.    Do not operate machinery or power tools for 24 hours.  Inspect the site at least twice daily. You may notice some bruising at the site and it may be tender for 1 to 2 weeks.     Increase your fluid intake for the next 2 days.    Keep arm elevated for 24 hours.  For the remainder of the day, keep your arm in the “Pledge of Allegiance” position when up and about.    Limit your activity for the next 48 hours and avoid strenuous activity as directed by your physician.   Cardiac Rehab may or may not be ordered.  Please consult with your physician  You may drive 24 hours after the procedure unless otherwise instructed by your caregiver.  A responsible adult should be with you for the first 24 hours after you arrive home.   Do not make any important legal decisions or sign legal  papers for 24 hours. Do not drink alcohol for 24 hours.    Take medicines only as directed by your health care provider.  Blood thinners may be prescribed after your procedure to improve blood flow through the stent.    Metformin or any medications containing Metformin should not be taken for 48 hours after your procedure.    Eat a heart-healthy diet. This should include plenty of fresh fruits and vegetables. Meat should be lean cuts. Avoid the following types of food:    Food that is high in salt.    Canned or highly processed food.    Food that is high in saturated fat or sugar.    Fried food.    Make any other lifestyle changes recommended by your health care provider. This may include:    Not using any tobacco products including cigarettes, chewing tobacco, or electronic cigarettes.   Managing your weight.    Getting regular exercise.    Managing your blood pressure.    Limiting your alcohol intake.    Managing other health problems, such as diabetes.    If you need an MRI after your heart stent was placed, be sure to tell the health care provider who orders the MRI that you have a heart stent.    Keep all follow-up visits as directed by your health care provider.      Call Your Doctor If:    You have unusual pain at the radial/ulnar (wrist) site.  You have redness, warmth, swelling, or pain at the radial/ulnar (wrist) site.  You have drainage (other than a small amount of blood on the dressing).  `You have chills or a fever > 101.  Your arm becomes pale or dark, cool, tingly, or numb.  You develop chest pain, shortness of breath, feel faint or pass out.    You have heavy bleeding from the site.  If you do, hold pressure on the site for 20 minutes.  If the bleeding stops, apply a fresh bandage and call your cardiologist.  However, if you continue to have bleeding, maintain pressure and call 911.    You have any symptoms of a stroke.  Remember BE FAST  B-balance. Sudden trouble walking or loss of  balance.  E-eyes.  Sudden changes in how you see or a sudden onset of a very bad headache.   F-face. Sudden weakness or loss of feeling of the face or facial droop on one side.   A-arms Sudden weakness or numbness in one arm. One arm drifts down if they are both held out in front of you. This happens suddenly and usually on one side of the body.   S-speech.  Sudden trouble speaking, slurred speech or trouble understanding what people are saying.   T-time  Time to call emergency services.  Write down the symptoms and the time they started.

## 2024-04-11 ENCOUNTER — OFFICE VISIT (OUTPATIENT)
Dept: CARDIOLOGY | Facility: CLINIC | Age: 69
End: 2024-04-11
Payer: MEDICARE

## 2024-04-11 VITALS
DIASTOLIC BLOOD PRESSURE: 73 MMHG | WEIGHT: 203.6 LBS | BODY MASS INDEX: 30.16 KG/M2 | HEART RATE: 57 BPM | HEIGHT: 69 IN | SYSTOLIC BLOOD PRESSURE: 114 MMHG

## 2024-04-11 DIAGNOSIS — I10 ESSENTIAL HYPERTENSION: ICD-10-CM

## 2024-04-11 DIAGNOSIS — R07.82 INTERCOSTAL PAIN: ICD-10-CM

## 2024-04-11 DIAGNOSIS — R06.02 SOB (SHORTNESS OF BREATH): ICD-10-CM

## 2024-04-11 DIAGNOSIS — I25.10 CORONARY ARTERY DISEASE INVOLVING NATIVE CORONARY ARTERY OF NATIVE HEART WITHOUT ANGINA PECTORIS: Primary | ICD-10-CM

## 2024-04-11 PROCEDURE — 99214 OFFICE O/P EST MOD 30 MIN: CPT | Performed by: INTERNAL MEDICINE

## 2024-04-11 PROCEDURE — 3074F SYST BP LT 130 MM HG: CPT | Performed by: INTERNAL MEDICINE

## 2024-04-11 PROCEDURE — 3078F DIAST BP <80 MM HG: CPT | Performed by: INTERNAL MEDICINE

## 2024-04-11 RX ORDER — EZETIMIBE 10 MG/1
10 TABLET ORAL DAILY
Qty: 90 TABLET | Refills: 3 | Status: SHIPPED | OUTPATIENT
Start: 2024-04-11

## 2024-04-11 RX ORDER — LISINOPRIL 5 MG/1
5 TABLET ORAL DAILY
Qty: 90 TABLET | Refills: 3 | Status: SHIPPED | OUTPATIENT
Start: 2024-04-11

## 2024-04-11 RX ORDER — ATORVASTATIN CALCIUM 40 MG/1
40 TABLET, FILM COATED ORAL NIGHTLY
Qty: 90 TABLET | Refills: 3 | Status: SHIPPED | OUTPATIENT
Start: 2024-04-11

## 2024-04-11 RX ORDER — CLOPIDOGREL BISULFATE 75 MG/1
75 TABLET ORAL DAILY
Qty: 90 TABLET | Refills: 3 | Status: SHIPPED | OUTPATIENT
Start: 2024-04-11

## 2024-04-11 NOTE — PROGRESS NOTES
Subjective:        Sharath Montero is a 68 y.o. male who here for follow up    CC  Coronary artery disease hypertension  HPI  68 years old male with coronary artery disease hypertension shortness of breath here for the follow-up with no complaints of chest pains tightness heaviness or the pressure sensation.[facility]       Problems Addressed this Visit          Cardiac and Vasculature    Essential hypertension    Coronary artery disease involving native coronary artery of native heart without angina pectoris - Primary    Chest pain       Pulmonary and Pneumonias    SOB (shortness of breath)     Diagnoses         Codes Comments    Coronary artery disease involving native coronary artery of native heart without angina pectoris    -  Primary ICD-10-CM: I25.10  ICD-9-CM: 414.01     Intercostal pain     ICD-10-CM: R07.82  ICD-9-CM: 786.59     SOB (shortness of breath)     ICD-10-CM: R06.02  ICD-9-CM: 786.05     Essential hypertension     ICD-10-CM: I10  ICD-9-CM: 401.9           .    The following portions of the patient's history were reviewed and updated as appropriate: allergies, current medications, past family history, past medical history, past social history, past surgical history and problem list.    Past Medical History:   Diagnosis Date    Asthma NO    Cancer NO    Chest pain     possibly due to pericarditis    CHF (congestive heart failure) NO    Clotting disorder NO    Congenital heart disease 7.11.19    COPD (chronic obstructive pulmonary disease) NO    Coronary artery disease     mild to moderate    Deep vein thrombosis NO    Diabetes mellitus NO    Heart disease     Heart disease 08/05/2016    Heart murmur NO    History of positive PPD     as a child     History of rotator cuff tear     LEFT 1988    Hyperlipidemia     Hypertension      reports that he quit smoking about 40 years ago. His smoking use included cigarettes. He started smoking about 53 years ago. He has a 13.9 pack-year smoking history. He  "has been exposed to tobacco smoke. He has never used smokeless tobacco. He reports that he does not currently use alcohol after a past usage of about 2.0 standard drinks of alcohol per week. He reports that he does not use drugs.   Family History   Problem Relation Age of Onset    Other Mother 65        cabg    Heart disease Mother     Heart disease Father     Other Father 45        cabg    Heart attack Father     Other Sister         BLOOD DISEASE     Malig Hyperthermia Neg Hx        Review of Systems  Constitutional: No wt loss, fever, fatigue  Gastrointestinal: No nausea, abdominal pain  Behavioral/Psych: No insomnia or anxiety   Cardiovascular no chest pains or tightness in the chest  Objective:       Physical Exam           Physical Exam  /73 (BP Location: Left arm)   Pulse 57   Ht 175.3 cm (69\")   Wt 92.4 kg (203 lb 9.6 oz)   BMI 30.07 kg/m²     General appearance: No acute changes   Eyes: Sclerae conjunctivae normal, pupils reactive   HENT: Atraumatic; oropharynx clear with moist mucous membranes and no mucosal ulcerations;  Neck: Trachea midline; NECK, supple, no thyromegaly or lymphadenopathy   Lungs: Normal size and shape, normal breath sounds, equal distribution of air, no rales and rhonchi   CV: S1-S2 regular, no murmurs, no rub, no gallop   Abdomen: Soft, nontender; no masses , no abnormal abdominal sounds   Extremities: No deformity , normal color , no peripheral edema   Skin: Normal temperature, turgor and texture; no rash, ulcers  Psych: Appropriate affect, alert and oriented to person, place and time           Procedures      Echocardiogram:    Results for orders placed in visit on 02/28/22    Adult Transthoracic Echo Complete W/ Cont if Necessary Per Protocol    Interpretation Summary  · Calculated left ventricular EF = 57.5% Estimated left ventricular EF was in agreement with the calculated left ventricular EF.  · Left ventricular diastolic function was normal.  · There is calcification " of the aortic valve.  · There is no evidence of pericardial effusion          Current Outpatient Medications:     aspirin 81 MG EC tablet, Take 1 tablet by mouth Every Morning., Disp: , Rfl:     folic acid (FOLVITE) 400 MCG tablet, Take 1 tablet by mouth Daily., Disp: , Rfl:     nitroglycerin (NITROSTAT) 0.4 MG SL tablet, 1 under the tongue as needed for angina, may repeat q5mins for up three doses, Disp: 100 tablet, Rfl: 11    Thiamine HCl (vitamin B-1) 50 MG tablet, Take 1 tablet by mouth Daily., Disp: , Rfl:     vitamin C (ASCORBIC ACID) 250 MG tablet, Take 4 tablets by mouth Daily., Disp: , Rfl:     atorvastatin (LIPITOR) 40 MG tablet, Take 1 tablet by mouth Every Night., Disp: 90 tablet, Rfl: 3    clopidogrel (PLAVIX) 75 MG tablet, Take 1 tablet by mouth Daily., Disp: 90 tablet, Rfl: 3    ezetimibe (Zetia) 10 MG tablet, Take 1 tablet by mouth Daily., Disp: 90 tablet, Rfl: 3    lisinopril (PRINIVIL,ZESTRIL) 5 MG tablet, Take 1 tablet by mouth Daily., Disp: 90 tablet, Rfl: 3    metoprolol tartrate (LOPRESSOR) 25 MG tablet, Take 1 tablet by mouth Every 12 (Twelve) Hours., Disp: 180 tablet, Rfl: 3   Assessment:                Plan:          ICD-10-CM ICD-9-CM   1. Coronary artery disease involving native coronary artery of native heart without angina pectoris  I25.10 414.01   2. Intercostal pain  R07.82 786.59   3. SOB (shortness of breath)  R06.02 786.05   4. Essential hypertension  I10 401.9     1. Coronary artery disease involving native coronary artery of native heart without angina pectoris  No angina pectoris    2. Intercostal pain  Atypical    3. SOB (shortness of breath)  Multifactorial    4. Essential hypertension  Patient understands importance of blood pressure check at home which patient does regularly and the blood pressures are well under control to the level of less than 140/90    P[ost p0ci    6 months  COUNSELING:    Sharath Gary was given to patient for the following topics:  diagnostic results, risk factor reductions, impressions, risks and benefits of treatment options and importance of treatment compliance .       SMOKING COUNSELING:        Dictated using Dragon dictation

## 2024-06-14 ENCOUNTER — OFFICE VISIT (OUTPATIENT)
Dept: FAMILY MEDICINE CLINIC | Facility: CLINIC | Age: 69
End: 2024-06-14
Payer: MEDICARE

## 2024-06-14 VITALS
BODY MASS INDEX: 28.04 KG/M2 | OXYGEN SATURATION: 99 % | TEMPERATURE: 97.8 F | RESPIRATION RATE: 16 BRPM | SYSTOLIC BLOOD PRESSURE: 116 MMHG | WEIGHT: 189.3 LBS | DIASTOLIC BLOOD PRESSURE: 74 MMHG | HEART RATE: 57 BPM | HEIGHT: 69 IN

## 2024-06-14 DIAGNOSIS — R73.03 PREDIABETES: ICD-10-CM

## 2024-06-14 DIAGNOSIS — I25.10 CORONARY ARTERY DISEASE INVOLVING NATIVE CORONARY ARTERY OF NATIVE HEART WITHOUT ANGINA PECTORIS: ICD-10-CM

## 2024-06-14 DIAGNOSIS — I10 ESSENTIAL HYPERTENSION: Primary | ICD-10-CM

## 2024-06-14 DIAGNOSIS — Z01.83 ENCOUNTER FOR BLOOD TYPING: ICD-10-CM

## 2024-06-14 DIAGNOSIS — E55.9 VITAMIN D DEFICIENCY, UNSPECIFIED: ICD-10-CM

## 2024-06-14 PROCEDURE — 99214 OFFICE O/P EST MOD 30 MIN: CPT | Performed by: INTERNAL MEDICINE

## 2024-06-14 PROCEDURE — 3074F SYST BP LT 130 MM HG: CPT | Performed by: INTERNAL MEDICINE

## 2024-06-14 PROCEDURE — 3078F DIAST BP <80 MM HG: CPT | Performed by: INTERNAL MEDICINE

## 2024-06-14 PROCEDURE — 1126F AMNT PAIN NOTED NONE PRSNT: CPT | Performed by: INTERNAL MEDICINE

## 2024-06-14 NOTE — ASSESSMENT & PLAN NOTE
Coronary artery disease is stable.  Continue current treatment regimen. Weight loss. Regular aerobic exercise.  Cardiac status will be reassessed in 3 months.

## 2024-06-14 NOTE — ASSESSMENT & PLAN NOTE
A1c still in the prediabetic range  Encouraged lifestyle changes such as low calorie intake, exercise and weight loss to improve A1c  To be reassessed in 3 months

## 2024-06-14 NOTE — PROGRESS NOTES
Chief Complaint   Patient presents with    [facility]    Hypertension   History of Present Illness:  Patient is here for 3-month follow-up of HTN, HLD, CAD s/p CABG, prediabetes.  He reports to be doing well overall, no new complaints today.  Recently underwent angioplasty and stenting of proximal LAD and RCA.  Currently on DAPT.  No further angina since surgery.  Has changed his diet to healthier options, and increasing physical activity to lose weight.  Reports blood pressure under control, averaging in the 120s over 70s.  Compliant on medication and denies any medication side effect.  Started taking vitamin D supplements.  Requesting to know his blood type.    PMH:   Outpatient Medications Prior to Visit   Medication Sig Dispense Refill    aspirin 81 MG EC tablet Take 1 tablet by mouth Every Morning.      atorvastatin (LIPITOR) 40 MG tablet Take 1 tablet by mouth Every Night. 90 tablet 3    Cholecalciferol 50 MCG (2000 UT) tablet Take 1 tablet by mouth Daily.      clopidogrel (PLAVIX) 75 MG tablet Take 1 tablet by mouth Daily. 90 tablet 3    ezetimibe (Zetia) 10 MG tablet Take 1 tablet by mouth Daily. 90 tablet 3    folic acid (FOLVITE) 400 MCG tablet Take 1 tablet by mouth Daily.      lisinopril (PRINIVIL,ZESTRIL) 5 MG tablet Take 1 tablet by mouth Daily. 90 tablet 3    metoprolol tartrate (LOPRESSOR) 25 MG tablet Take 1 tablet by mouth Every 12 (Twelve) Hours. 180 tablet 3    nitroglycerin (NITROSTAT) 0.4 MG SL tablet 1 under the tongue as needed for angina, may repeat q5mins for up three doses 100 tablet 11    Thiamine HCl (vitamin B-1) 50 MG tablet Take 1 tablet by mouth Daily.      vitamin C (ASCORBIC ACID) 250 MG tablet Take 4 tablets by mouth Daily.      cyanocobalamin ([employer] Vitamin B-12) 1000 MCG tablet Take 1 tablet by mouth Daily.       No facility-administered medications prior to visit.      No Known Allergies  Past Surgical History:   Procedure Laterality Date    CARDIAC CATHETERIZATION       angioplasty with stent 1998    CARDIAC CATHETERIZATION  04/06/2015    CARDIAC CATHETERIZATION N/A 07/03/2019    Procedure: Left Heart Cath;  Surgeon: Guera Perry MD;  Location:  [facility] CATH INVASIVE LOCATION;  Service: Cardiology    CARDIAC CATHETERIZATION N/A 07/03/2019    Procedure: Coronary angiography;  Surgeon: Guera Perry MD;  Location:  [facility] CATH INVASIVE LOCATION;  Service: Cardiology    CARDIAC CATHETERIZATION N/A 07/03/2019    Procedure: Left ventriculography;  Surgeon: Guera Perry MD;  [facility];  Service: Cardiology    CARDIAC CATHETERIZATION N/A 08/29/2019    Procedure: Left Heart Cath;  Surgeon: Guera Perry MD;  [facility];  Service: Cardiology    CARDIAC CATHETERIZATION N/A 08/29/2019    Procedure: Coronary angiography;  Surgeon: Guera Perry MD;  [facility];  Service: Cardiology    CARDIAC CATHETERIZATION N/A 08/29/2019    Procedure: Left ventriculography;  Surgeon: Guera Perry MD;  [facility];  Service: Cardiology    CARDIAC CATHETERIZATION N/A 3/6/2024    Procedure: Left Heart Cath;  Surgeon: Guera Perry MD;  [facility];  Service: Cardiovascular;  Laterality: N/A;    CARDIAC CATHETERIZATION N/A 3/6/2024    Procedure: Left ventriculography;  Surgeon: Guera Perry MD;  [facility];  Service: Cardiovascular;  Laterality: N/A;    CARDIAC CATHETERIZATION  3/6/2024    Procedure: Saphenous Vein Graft;  Surgeon: Guera Perry MD;  [facility];  Service: Cardiovascular;;    CARDIAC CATHETERIZATION N/A 3/6/2024    Procedure: Stent JAMSHID coronary;  Surgeon: Guera Perry MD;  [facility];  Service: Cardiovascular;  Laterality: N/A;    CARDIAC CATHETERIZATION N/A 3/6/2024     Procedure: Coronary angiography;  Surgeon: Guera Perry MD;  Location: [facility] CATH INVASIVE LOCATION;  Service: Cardiovascular;  Laterality: N/A;    CARDIAC CATHETERIZATION N/A 3/20/2024    Procedure: Percutaneous Coronary Intervention;  Surgeon: Guera Perry MD;  Location: [facility] CATH INVASIVE LOCATION;  Service: Cardiovascular;  Laterality: N/A;    CARDIAC CATHETERIZATION N/A 3/20/2024    Procedure: Stent JAMSHID coronary;  Surgeon: Guera Perry MD;  Location: [facility] CATH INVASIVE LOCATION;  Service: Cardiovascular;  Laterality: N/A;    CAROTID STENT  4/1/1996    COLONOSCOPY N/A 03/05/2020    Procedure: COLONOSCOPY to cecum and t.i. with hot snare polypectomyies, biopsies;  Surgeon: Keith Joseph Jr., MD;  Location: [facility] ENDOSCOPY;  Service: General;  Laterality: N/A;  pre- + cologuard test  post- polyps, focal proctitis, anal skin tags    CORONARY ARTERY BYPASS GRAFT N/A 07/11/2019    Procedure: TYLOR STERNOTOMY CORONARY ARTERY BYPASS GRAFT TIMES 4 USING LEFT INTERNAL MAMMARY ARTERY AND RIGHT GREATER SAPHENOUS VEIN GRAFT PER ENDOSCOPIC VEIN HARVESTING AND PRP;  Surgeon: Gael Mccall MD;  Location: [facility] MAIN OR;  Service: Cardiothoracic    CORONARY STENT PLACEMENT      SKIN CANCER EXCISION N/A 2013    TONSILLECTOMY      VEIN SURGERY  7.11.19    right leg    WRIST SURGERY Left      family history includes Heart attack in his father; Heart disease in his father and mother; Other in his sister; Other (age of onset: 45) in his father; Other (age of onset: 65) in his mother.   reports that he quit smoking about 40 years ago. His smoking use included cigarettes. He started smoking about 54 years ago. He has a 13.9 pack-year smoking history. He has been exposed to tobacco smoke. He has never used smokeless tobacco. He reports that he does not currently use alcohol after a past usage of about 2.0 standard drinks of alcohol per week. He reports that he does not use drugs.     BP  "116/74 (BP Location: Right arm, Patient Position: Sitting, Cuff Size: Adult)   Pulse 57   Temp 97.8 °F (36.6 °C) (Oral)   Resp 16   Ht 175.3 cm (69\")   Wt 85.9 kg (189 lb 4.8 oz)   SpO2 99%   BMI 27.95 kg/m²   Physical Exam  Constitutional:       Appearance: Normal appearance.   HENT:      Head: Normocephalic and atraumatic.   Neurological:      Mental Status: He is alert and oriented to person, place, and time.   Psychiatric:         Mood and Affect: Mood normal.          The following data was reviewed by: Laura Sanchez MD on 06/14/2024:  Common labs          3/4/2024    13:55 3/7/2024    03:14 3/14/2024    14:52   Common Labs   Glucose 91  111  124    BUN 17  13  19    Creatinine 1.05  0.93  1.47    Sodium 140  138  137    Potassium 4.2  4.1  4.6    Chloride 103  103  103    Calcium 9.7  8.9  9.6    Albumin   4.8    Total Bilirubin   0.4    Alkaline Phosphatase   64    AST (SGOT)   24    ALT (SGPT)   26    WBC 6.51  6.30  6.67    Hemoglobin 15.1  14.0  15.2    Hematocrit 45.0  41.9  46.1    Platelets 158  155  197    Total Cholesterol  112     Triglycerides  126     HDL Cholesterol  40     LDL Cholesterol   50     Hemoglobin A1C  6.30            Diagnoses and all orders for this visit:    1. Essential hypertension (Primary)  Assessment & Plan:  Hypertension is stable and controlled  Continue current treatment regimen.  Dietary sodium restriction.  Weight loss.  Regular aerobic exercise.  Ambulatory blood pressure monitoring.  Blood pressure will be reassessed in 3 months.    Orders:  -     Basic Metabolic Panel; Future    2. Prediabetes  Assessment & Plan:  A1c still in the prediabetic range  Encouraged lifestyle changes such as low calorie intake, exercise and weight loss to improve A1c  To be reassessed in 3 months    Orders:  -     Hemoglobin A1c; Future  -     Basic Metabolic Panel; Future    3. Coronary artery disease involving native coronary artery of native heart without angina " pectoris  Overview:  Added automatically from request for surgery [id number]    Assessment & Plan:  Coronary artery disease is stable.  Continue current treatment regimen. Weight loss. Regular aerobic exercise.  Cardiac status will be reassessed in 3 months.      4. Encounter for blood typing  -     ABO/Rh; Future    5. Vitamin D deficiency, unspecified  -     Vitamin D,25-Hydroxy; Future             Return in about 3 months (around 9/14/2024) for Recheck.

## 2024-08-29 DIAGNOSIS — I10 ESSENTIAL HYPERTENSION: ICD-10-CM

## 2024-08-29 RX ORDER — LISINOPRIL 5 MG/1
5 TABLET ORAL DAILY
Qty: 90 TABLET | Refills: 3 | OUTPATIENT
Start: 2024-08-29

## 2024-08-29 RX ORDER — EZETIMIBE 10 MG/1
10 TABLET ORAL DAILY
Qty: 90 TABLET | Refills: 3 | OUTPATIENT
Start: 2024-08-29

## 2024-08-29 RX ORDER — METOPROLOL TARTRATE 25 MG/1
25 TABLET, FILM COATED ORAL EVERY 12 HOURS
Qty: 180 TABLET | Refills: 3 | OUTPATIENT
Start: 2024-08-29

## 2024-08-29 RX ORDER — CLOPIDOGREL BISULFATE 75 MG/1
75 TABLET ORAL DAILY
Qty: 90 TABLET | Refills: 3 | OUTPATIENT
Start: 2024-08-29

## 2024-08-29 RX ORDER — ATORVASTATIN CALCIUM 40 MG/1
40 TABLET, FILM COATED ORAL NIGHTLY
Qty: 90 TABLET | Refills: 3 | OUTPATIENT
Start: 2024-08-29

## 2024-10-04 ENCOUNTER — OFFICE VISIT (OUTPATIENT)
Dept: FAMILY MEDICINE CLINIC | Facility: CLINIC | Age: 69
End: 2024-10-04
Payer: MEDICARE

## 2024-10-04 VITALS
HEIGHT: 69 IN | SYSTOLIC BLOOD PRESSURE: 112 MMHG | HEART RATE: 63 BPM | BODY MASS INDEX: 27.31 KG/M2 | RESPIRATION RATE: 14 BRPM | WEIGHT: 184.4 LBS | DIASTOLIC BLOOD PRESSURE: 72 MMHG | OXYGEN SATURATION: 99 % | TEMPERATURE: 97.2 F

## 2024-10-04 DIAGNOSIS — E55.9 VITAMIN D DEFICIENCY, UNSPECIFIED: ICD-10-CM

## 2024-10-04 DIAGNOSIS — I25.10 CORONARY ARTERY DISEASE INVOLVING NATIVE CORONARY ARTERY OF NATIVE HEART WITHOUT ANGINA PECTORIS: ICD-10-CM

## 2024-10-04 DIAGNOSIS — R73.03 PREDIABETES: ICD-10-CM

## 2024-10-04 DIAGNOSIS — I10 ESSENTIAL HYPERTENSION: Primary | ICD-10-CM

## 2024-10-04 DIAGNOSIS — E78.5 HYPERLIPIDEMIA, UNSPECIFIED HYPERLIPIDEMIA TYPE: ICD-10-CM

## 2024-10-04 PROCEDURE — 1126F AMNT PAIN NOTED NONE PRSNT: CPT | Performed by: INTERNAL MEDICINE

## 2024-10-04 PROCEDURE — 99214 OFFICE O/P EST MOD 30 MIN: CPT | Performed by: INTERNAL MEDICINE

## 2024-10-04 PROCEDURE — 3074F SYST BP LT 130 MM HG: CPT | Performed by: INTERNAL MEDICINE

## 2024-10-04 PROCEDURE — 3078F DIAST BP <80 MM HG: CPT | Performed by: INTERNAL MEDICINE

## 2024-10-04 RX ORDER — NITROGLYCERIN 0.4 MG/1
TABLET SUBLINGUAL
Qty: 100 TABLET | Refills: 11 | Status: SHIPPED | OUTPATIENT
Start: 2024-10-04

## 2024-10-04 NOTE — ASSESSMENT & PLAN NOTE
Lipid abnormalities are  well-controlled and stable    Plan:  Continue same medication/s without change.    Counseled patient on lifestyle modifications to help control hyperlipidemia.   Advised patient to exercise for 150 minutes weekly. (30 minute brisk walk, 5 days a week for example)  Weight Loss encouraged    Patient Treatment Goals:   LDL goal is less than 55    Followup in 6 months.

## 2024-10-04 NOTE — ASSESSMENT & PLAN NOTE
Coronary artery disease is stable.  Continue on DAPT and metoprolol  Continue current treatment regimen. Dietary sodium restriction. Weight loss. Regular aerobic exercise.  Cardiac status will be reassessed in 6 months.

## 2024-10-04 NOTE — ASSESSMENT & PLAN NOTE
A1c improving  Encouraged to continue lifestyle modification such as low calorie intake, regular exercise and weight loss to improve A1c  To be reassessed in 6 months

## 2024-10-04 NOTE — PROGRESS NOTES
Chief Complaint   Patient presents with    Results     [facility]UP LABS    Hypertension    Med Management    Med Refill      History of Present Illness:  Patient is here for follow-up of HTN, HLD, diabetes, CAD status post CABG and recent coronary angioplasty.  He reports to be doing well overall, no new complaints today.  Maintaining a healthy diet and exercises regularly.  Reports about 20 lbs weight loss since last visit.  Blood pressure well-controlled, averaging in the 110s over 70s.    Lab review indicate A1c improving(6.3-> 6.0%), electrolyte WNL, normal BUN/creatinine.  Vitamin D normal    PMH:   Outpatient Medications Prior to Visit   Medication Sig Dispense Refill    aspirin 81 MG EC tablet Take 1 tablet by mouth Every Morning.      atorvastatin (LIPITOR) 40 MG tablet Take 1 tablet by mouth Every Night. 90 tablet 3    Cholecalciferol 50 MCG (2000 UT) tablet Take 1 tablet by mouth Daily.      clopidogrel (PLAVIX) 75 MG tablet Take 1 tablet by mouth Daily. 90 tablet 3    cyanocobalamin ([employer] Vitamin B-12) 1000 MCG tablet Take 1 tablet by mouth Daily.      ezetimibe (Zetia) 10 MG tablet Take 1 tablet by mouth Daily. 90 tablet 3    folic acid (FOLVITE) 400 MCG tablet Take 1 tablet by mouth Daily.      lisinopril (PRINIVIL,ZESTRIL) 5 MG tablet Take 1 tablet by mouth Daily. 90 tablet 3    metoprolol tartrate (LOPRESSOR) 25 MG tablet Take 1 tablet by mouth Every 12 (Twelve) Hours. 180 tablet 3    Thiamine HCl (vitamin B-1) 50 MG tablet Take 1 tablet by mouth Daily.      vitamin C (ASCORBIC ACID) 250 MG tablet Take 4 tablets by mouth Daily.      nitroglycerin (NITROSTAT) 0.4 MG SL tablet 1 under the tongue as needed for angina, may repeat q5mins for up three doses 100 tablet 11     No facility-administered medications prior to visit.      No Known Allergies  Past Surgical History:   Procedure Laterality Date    CARDIAC CATHETERIZATION      angioplasty with stent 1998    CARDIAC CATHETERIZATION  04/06/2015    CARDIAC  CATHETERIZATION N/A 07/03/2019    Procedure: Left Heart Cath;  Surgeon: Guera Perry MD;  Location:  [facility] CATH INVASIVE LOCATION;  Service: Cardiology    CARDIAC CATHETERIZATION N/A 07/03/2019    Procedure: Coronary angiography;  Surgeon: Guera Perry MD;  Location:  [facility] CATH INVASIVE LOCATION;  Service: Cardiology    CARDIAC CATHETERIZATION N/A 07/03/2019    Procedure: Left ventriculography;  Surgeon: Guera Perry MD;  Location:  [facility] CATH INVASIVE LOCATION;  Service: Cardiology    CARDIAC CATHETERIZATION N/A 08/29/2019    Procedure: Left Heart Cath;  Surgeon: Guera Perry MD;  Location:  [facility] CATH INVASIVE LOCATION;  Service: Cardiology    CARDIAC CATHETERIZATION N/A 08/29/2019    Procedure: Coronary angiography;  Surgeon: Guera Perry MD;  Location:  [facility] CATH INVASIVE LOCATION;  Service: Cardiology    CARDIAC CATHETERIZATION N/A 08/29/2019    Procedure: Left ventriculography;  Surgeon: Guera Perry MD;  [facility];  Service: Cardiology    CARDIAC CATHETERIZATION N/A 3/6/2024    Procedure: Left Heart Cath;  Surgeon: Guera Perry MD;  [facility];  Service: Cardiovascular;  Laterality: N/A;    CARDIAC CATHETERIZATION N/A 3/6/2024    Procedure: Left ventriculography;  Surgeon: Guera Perry MD;  [facility];  Service: Cardiovascular;  Laterality: N/A;    CARDIAC CATHETERIZATION  3/6/2024    Procedure: Saphenous Vein Graft;  Surgeon: Guera Perry MD;  [facility];  Service: Cardiovascular;;    CARDIAC CATHETERIZATION N/A 3/6/2024    Procedure: Stent JAMSHID coronary;  Surgeon: Guera Perry MD;  [facility];  Service: Cardiovascular;  Laterality: N/A;    CARDIAC CATHETERIZATION N/A 3/6/2024    Procedure: Coronary angiography;  Surgeon: Guera Perry MD;  [facility];  Service: Cardiovascular;  Laterality: N/A;    CARDIAC CATHETERIZATION N/A 3/20/2024    Procedure: Percutaneous Coronary Intervention;  Surgeon: Guera Perry MD;  Location: [facility] CATH INVASIVE LOCATION;  Service: Cardiovascular;  Laterality: N/A;    CARDIAC CATHETERIZATION N/A 3/20/2024    Procedure: Stent JAMSHID coronary;  Surgeon: Guera Perry MD;  Location: [facility] CATH INVASIVE LOCATION;  Service: Cardiovascular;  Laterality: N/A;    CAROTID STENT  4/1/1996    COLONOSCOPY N/A 03/05/2020    Procedure: COLONOSCOPY to cecum and t.i. with hot snare polypectomyies, biopsies;  Surgeon: Keith Joseph Jr., MD;  Location: [facility] ENDOSCOPY;  Service: General;  Laterality: N/A;  pre- + cologuard test  post- polyps, focal proctitis, anal skin tags    CORONARY ARTERY BYPASS GRAFT N/A 07/11/2019    Procedure: TYLOR STERNOTOMY CORONARY ARTERY BYPASS GRAFT TIMES 4 USING LEFT INTERNAL MAMMARY ARTERY AND RIGHT GREATER SAPHENOUS VEIN GRAFT PER ENDOSCOPIC VEIN HARVESTING AND PRP;  Surgeon: Gael Mccall MD;  Location: [facility] MAIN OR;  Service: Cardiothoracic    CORONARY STENT PLACEMENT      SKIN CANCER EXCISION N/A 2013    TONSILLECTOMY      VEIN SURGERY  7.11.19    right leg    WRIST SURGERY Left      family history includes Heart attack in his father; Heart disease in his father and mother; Other in his sister; Other (age of onset: 45) in his father; Other (age of onset: 65) in his mother.   reports that he quit smoking about 40 years ago. His smoking use included cigarettes. He started smoking about 54 years ago. He has a 13.9 pack-year smoking history. He has been exposed to tobacco smoke. He has never used smokeless tobacco. He reports that he does not currently use alcohol after a past usage of about 2.0 standard drinks of alcohol per week. He reports that he does not use drugs.     /72   Pulse 63   Temp 97.2 °F (36.2 °C) (Temporal)   Resp 14   Ht 175.3 cm (69\")   " Wt 83.6 kg (184 lb 6.4 oz)   SpO2 99%   BMI 27.23 kg/m²   Physical Exam  Constitutional:       Appearance: Normal appearance.   HENT:      Head: Normocephalic and atraumatic.   Cardiovascular:      Heart sounds: Normal heart sounds.   Pulmonary:      Breath sounds: Normal breath sounds.   Abdominal:      General: Bowel sounds are normal.      Palpations: Abdomen is soft.   Neurological:      Mental Status: He is alert and oriented to person, place, and time.          The following data was reviewed by: Laura Sanchez MD on 10/04/2024:  Common labs          3/7/2024    03:14 3/14/2024    14:52 9/13/2024    09:57   Common Labs   Glucose 111  124  109    BUN 13  19  13    Creatinine 0.93  1.47  1.01    Sodium 138  137  138    Potassium 4.1  4.6  4.7    Chloride 103  103  103    Calcium 8.9  9.6  9.9    Albumin  4.8     Total Bilirubin  0.4     Alkaline Phosphatase  64     AST (SGOT)  24     ALT (SGPT)  26     WBC 6.30  6.67     Hemoglobin 14.0  15.2     Hematocrit 41.9  46.1     Platelets 155  197     Total Cholesterol 112      Triglycerides 126      HDL Cholesterol 40      LDL Cholesterol  50      Hemoglobin A1C 6.30   6.00           Diagnoses and all orders for this visit:    1. Essential hypertension (Primary)  Assessment & Plan:  Hypertension is stable and controlled  Continue current treatment regimen.  Dietary sodium restriction.  Weight loss.  Regular aerobic exercise.  Ambulatory blood pressure monitoring.  Blood pressure will be reassessed in 6 months.      2. Hyperlipidemia, unspecified hyperlipidemia type  Assessment & Plan:   Lipid abnormalities are  well-controlled and stable    Plan:  Continue same medication/s without change.    Counseled patient on lifestyle modifications to help control hyperlipidemia.   Advised patient to exercise for 150 minutes weekly. (30 minute brisk walk, 5 days a week for example)  Weight Loss encouraged    Patient Treatment Goals:   LDL goal is less than 55    Followup in 6  months.    Orders:  -     Lipid Panel With / Chol / HDL Ratio; Future    3. Prediabetes  Assessment & Plan:  A1c improving  Encouraged to continue lifestyle modification such as low calorie intake, regular exercise and weight loss to improve A1c  To be reassessed in 6 months      4. Coronary artery disease involving native coronary artery of native heart without angina pectoris  Assessment & Plan:  Coronary artery disease is stable.  Continue on DAPT and metoprolol  Continue current treatment regimen. Dietary sodium restriction. Weight loss. Regular aerobic exercise.  Cardiac status will be reassessed in 6 months.    Orders:  -     nitroglycerin (NITROSTAT) 0.4 MG SL tablet; 1 under the tongue as needed for angina, may repeat q5mins for up three doses  Dispense: 100 tablet; Refill: 11    5. Vitamin D deficiency, unspecified  Assessment & Plan:  Vitamin D within normal range  Continue on daily vitamin D supplement               Return in about 6 months (around 4/4/2025) for Follow up with fasting labs.

## 2024-12-27 ENCOUNTER — TELEPHONE (OUTPATIENT)
Dept: FAMILY MEDICINE CLINIC | Facility: CLINIC | Age: 69
End: 2024-12-27

## 2024-12-27 NOTE — TELEPHONE ENCOUNTER
"    Caller: Sharath Montero \"Elijah\"    Relationship to patient: Self    Best call back number: [phone]      Patient is needing:   PATIENT CALLED IN STATING THAT HE HAS SOME NERVE DAMAGE IN RIGHT ARM.   PATIENT WOULD LIKE TO TALK TO , IF POSSIBLE.     PLEASE CALL TO CONFIRM OR DENY.             "

## 2024-12-30 NOTE — TELEPHONE ENCOUNTER
Spoke with pt, he went to see ortho today and says he's good for now and if he needs Dr. GURROLA he will call back

## 2025-02-13 DIAGNOSIS — I10 ESSENTIAL HYPERTENSION: ICD-10-CM

## 2025-02-13 RX ORDER — EZETIMIBE 10 MG/1
10 TABLET ORAL DAILY
Qty: 90 TABLET | Refills: 3 | OUTPATIENT
Start: 2025-02-13

## 2025-02-13 RX ORDER — METOPROLOL TARTRATE 25 MG/1
25 TABLET, FILM COATED ORAL EVERY 12 HOURS
Qty: 180 TABLET | Refills: 3 | OUTPATIENT
Start: 2025-02-13

## 2025-02-13 RX ORDER — LISINOPRIL 5 MG/1
5 TABLET ORAL DAILY
Qty: 90 TABLET | Refills: 3 | OUTPATIENT
Start: 2025-02-13

## 2025-02-27 NOTE — Clinical Note
inserted over wire.

## 2025-04-18 ENCOUNTER — LAB (OUTPATIENT)
Dept: FAMILY MEDICINE CLINIC | Facility: CLINIC | Age: 70
End: 2025-04-18
Payer: MEDICARE

## 2025-04-18 DIAGNOSIS — Z01.83 ENCOUNTER FOR BLOOD TYPING: ICD-10-CM

## 2025-04-18 DIAGNOSIS — Z12.5 PROSTATE CANCER SCREENING: Primary | ICD-10-CM

## 2025-06-16 DIAGNOSIS — I10 ESSENTIAL HYPERTENSION: ICD-10-CM

## 2025-06-16 RX ORDER — EZETIMIBE 10 MG/1
10 TABLET ORAL DAILY
Qty: 90 TABLET | Refills: 0 | Status: SHIPPED | OUTPATIENT
Start: 2025-06-16

## 2025-06-16 RX ORDER — METOPROLOL TARTRATE 25 MG/1
25 TABLET, FILM COATED ORAL EVERY 12 HOURS
Qty: 180 TABLET | Refills: 0 | Status: SHIPPED | OUTPATIENT
Start: 2025-06-16

## 2025-06-16 RX ORDER — LISINOPRIL 5 MG/1
5 TABLET ORAL DAILY
Qty: 90 TABLET | Refills: 0 | Status: SHIPPED | OUTPATIENT
Start: 2025-06-16

## (undated) DEVICE — GLIDESHEATH SLENDER STAINLESS STEEL KIT: Brand: GLIDESHEATH SLENDER

## (undated) DEVICE — SOL IRR H2O BTL 1000ML STRL

## (undated) DEVICE — CANN AORT ROOT DLP 12G 9F

## (undated) DEVICE — CANN O2 ETCO2 FITS ALL CONN CO2 SMPL A/ 7IN DISP LF

## (undated) DEVICE — RUNTHROUGH NS EXTRA FLOPPY PTCA GUIDEWIRE: Brand: RUNTHROUGH

## (undated) DEVICE — DRSNG SURESITE WNDW 2.38X2.75

## (undated) DEVICE — Device

## (undated) DEVICE — INSUFFLATION TUBING,LAPAROSCOPIC: Brand: DEROYAL

## (undated) DEVICE — SUT ETHIBOND EXCEL POLYSTR DBL/ARM SH 3/0 30IN

## (undated) DEVICE — KT MANIFLD CARDIAC

## (undated) DEVICE — DGW .035 FC J3MM 260CM TEF: Brand: EMERALD

## (undated) DEVICE — MICROSURGICAL DILATATION DEVICE: Brand: WOLVERINE CORONARY CUTTING BALLOON

## (undated) DEVICE — GLV SURG BIOGEL LTX PF 7

## (undated) DEVICE — BLOWER/MISTER AXIOUS OPCAB W/TBG

## (undated) DEVICE — OPTIFOAM GENTLE SA, POSTOP, 4X12: Brand: MEDLINE

## (undated) DEVICE — SOL IRR NACL 0.9PCT BT 1000ML

## (undated) DEVICE — DEV INDEFLATOR P/N 580289

## (undated) DEVICE — TEMP PACING WIRE: Brand: MYO/WIRE

## (undated) DEVICE — GUIDE CATHETER: Brand: MACH1™

## (undated) DEVICE — PINNACLE INTRODUCER SHEATH: Brand: PINNACLE

## (undated) DEVICE — SNAR POLYP SENSATION STDOVL 27 240 BX40

## (undated) DEVICE — 12 FOOT DISPOSABLE EXTENSION CABLE WITH SAFE CONNECT / SCREW-DOWN

## (undated) DEVICE — SPNG GZ WOVN 4X4IN 12PLY 10/BX STRL

## (undated) DEVICE — SINGLE-USE BIOPSY FORCEPS: Brand: RADIAL JAW 4

## (undated) DEVICE — SENSR O2 OXIMAX FNGR A/ 18IN NONSTR

## (undated) DEVICE — CLAMP INSERT: Brand: STEALTH® CLAMP INSERT

## (undated) DEVICE — CATH DIAG IMPULSE AR1 5F 100CM

## (undated) DEVICE — CANN RETRGR STYLET RSCP 15F

## (undated) DEVICE — 6F .070 JR 4 100CM: Brand: CORDIS

## (undated) DEVICE — PK PERFUS CUST W/CARDIOPLEGIA

## (undated) DEVICE — TREK CORONARY DILATATION CATHETER 2.50 MM X 12 MM / RAPID-EXCHANGE: Brand: TREK

## (undated) DEVICE — ADHS SKIN DERMABOND TOP ADVANCED

## (undated) DEVICE — CANN IRR VEN BVL TP

## (undated) DEVICE — SYS PERFUS SEP PLATLT W TIPS CUST

## (undated) DEVICE — ROTATING SURGICAL PUNCHES, 1 PER POUCH: Brand: A&E MEDICAL / ROTATING SURGICAL PUNCHES

## (undated) DEVICE — SUT SILK 0 CT1 CR8 18IN C021D

## (undated) DEVICE — OASIS DRAIN, SINGLE, INLINE & ATS COMPATIBLE: Brand: OASIS

## (undated) DEVICE — ST. SORBAVIEW ULTIMATE IJ SYSTEM A,C: Brand: CENTURION

## (undated) DEVICE — VASOVIEW HEMOPRO: Brand: VASOVIEW HEMOPRO

## (undated) DEVICE — HEMOCONCENTRATOR PERFUS LPS06

## (undated) DEVICE — 28 FR RIGHT ANGLE – SOFT PVC CATHETER: Brand: PVC THORACIC CATHETERS

## (undated) DEVICE — BALN PTCA TAKERU RX 2.5X12MM

## (undated) DEVICE — ADAPT CLN BIOGUARD AIR/H2O DISP

## (undated) DEVICE — PK CATH CARD 40

## (undated) DEVICE — GUIDELINER CATHETERS ARE INTENDED TO BE USED IN CONJUNCTION WITH GUIDE CATHETERS TO ACCESS DISCRETE REGIONS OF THE CORONARY AND/OR PERIPHERAL VASCULATURE, AND TO FACILITATE PLACEMENT OF INTERVENTIONAL DEVICES.: Brand: GUIDELINER® V3 CATHETER

## (undated) DEVICE — CATH VENT MIV RADL PIG ST TIP 4F 110CM

## (undated) DEVICE — BIOPATCH™ ANTIMICROBIAL DRESSING WITH CHLORHEXIDINE GLUCONATE IS A HYDROPHILLIC POLYURETHANE ABSORPTIVE FOAM WITH CHLORHEXIDINE GLUCONATE (CHG) WHICH INHIBITS BACTERIAL GROWTH UNDER THE DRESSING. THE DRESSING IS INTENDED TO BE USED TO ABSORB EXUDATE, COVER A WOUND CAUSED BY VASCULAR AND NONVASCULAR PERCUTANEOUS MEDICAL DEVICES DURING SURGERY, AS WELL AS REDUCE LOCAL INFECTION AND COLONIZATION OF MICROORGANISMS.: Brand: BIOPATCH

## (undated) DEVICE — ST PERFUS M/

## (undated) DEVICE — CATH DIAG RADL PERFORMA ULTIMATE ULT1 5F .038IN 100CM

## (undated) DEVICE — CATH DIAG IMPULSE FL3.5 5F 100CM

## (undated) DEVICE — GW EMR FIX EXCHG J STD .035 3MM 260CM

## (undated) DEVICE — XIENCE SKYPOINT™ EVEROLIMUS ELUTING CORONARY STENT SYSTEM 3.00 MM X 23 MM / RAPID-EXCHANGE
Type: IMPLANTABLE DEVICE | Site: CORONARY | Status: NON-FUNCTIONAL
Brand: XIENCE SKYPOINT™
Removed: 2024-03-06

## (undated) DEVICE — CATH DIAG IMPULSE IMT 5F 100CM

## (undated) DEVICE — XIENCE SKYPOINT™ EVEROLIMUS ELUTING CORONARY STENT SYSTEM 2.50 MM X 33 MM / RAPID-EXCHANGE
Type: IMPLANTABLE DEVICE | Site: CORONARY | Status: NON-FUNCTIONAL
Brand: XIENCE SKYPOINT™

## (undated) DEVICE — PAD GRND REM POLYHESIVE A/ DISP

## (undated) DEVICE — FR5 INFINITI MULTIPAC: Brand: INFINITI

## (undated) DEVICE — ADAPT ANTEGRADE RETRGR

## (undated) DEVICE — CATH DIAG IMPULSE FR4 5F 100CM

## (undated) DEVICE — BNDG ELAS ELITE V/CLOSE 6IN 5YD LF STRL

## (undated) DEVICE — CATH DIAG IMPULSE MPA2 5F 125CM

## (undated) DEVICE — THE SINGLE USE ETRAP – POLYP TRAP IS USED FOR SUCTION RETRIEVAL OF ENDOSCOPICALLY REMOVED POLYPS.: Brand: ETRAP

## (undated) DEVICE — TUBING, SUCTION, 1/4" X 10', STRAIGHT: Brand: MEDLINE

## (undated) DEVICE — SOL ISO/ALC RUB 70PCT 4OZ

## (undated) DEVICE — KT ORCA ORCAPOD DISP STRL

## (undated) DEVICE — THE TORRENT IRRIGATION SCOPE CONNECTOR IS USED WITH THE TORRENT IRRIGATION TUBING TO PROVIDE IRRIGATION FLUIDS SUCH AS STERILE WATER DURING GASTROINTESTINAL ENDOSCOPIC PROCEDURES WHEN USED IN CONJUNCTION WITH AN IRRIGATION PUMP (OR ELECTROSURGICAL UNIT).: Brand: TORRENT

## (undated) DEVICE — CORONARY ARTERY BYPASS GRAFT MARKERS, STAINLESS STEEL, DISTAL, WITHOUT HOLDER: Brand: ANASTOMARK CORONARY ARTERY BYPASS GRAFT MARKERS, STAINLESS STEEL, DISTAL

## (undated) DEVICE — ADAPT CARDIO VNT Y/TYP

## (undated) DEVICE — GLV SURG BIOGEL LTX PF 6 1/2

## (undated) DEVICE — TBG ART PRESS 60 IN

## (undated) DEVICE — BLD BEAVER MICROSHARP 15D 3MM BLU LF

## (undated) DEVICE — CATH DIAG IMPULSE FL4 5F 100CM

## (undated) DEVICE — 28 FR STRAIGHT – SOFT PVC CATHETER: Brand: PVC THORACIC CATHETERS

## (undated) DEVICE — CATH DIAG IMPULSE AL1 5F 100CM

## (undated) DEVICE — ST TOURNI COMPL A/ 7IN

## (undated) DEVICE — CATH INTRAVASC/LITHO C2PLUS 2.5X12MM

## (undated) DEVICE — LN SMPL CO2 SHTRM SD STREAM W/M LUER

## (undated) DEVICE — GLV SURG NEOLON 2G PF LF 7.5 STRL

## (undated) DEVICE — SUT ETHIBOND EXCEL POLYSTR DBL/ARM BB 3/0 36IN

## (undated) DEVICE — PK HEART OPN 40

## (undated) DEVICE — ST BLD COL SAFETYLOK LS 23GA 3/4IN 12IN

## (undated) DEVICE — CANN VESL FREE FLO 2MM

## (undated) DEVICE — TR BAND RADIAL ARTERY COMPRESSION DEVICE: Brand: TR BAND

## (undated) DEVICE — SENSR CERBRL O2 PK/2

## (undated) DEVICE — CAUTERY TIP POLISHER: Brand: DEVON

## (undated) DEVICE — CANN ART DLP 1PC EDPA A/ 20F

## (undated) DEVICE — BALN PTCA TAKERU RX NC 2.5X12MM

## (undated) DEVICE — BNDG ELAS ELITE V/CLOSE 4IN 5YD LF STRL

## (undated) DEVICE — INTRO SHEATH ART/FEM ENGAGE .035 6F12CM

## (undated) DEVICE — PK ATS CUST W CARDIOTOMY RESEVOIR

## (undated) DEVICE — 3M™ MEDIPORE™ H SOFT CLOTH SURGICAL TAPE 2862, 2 INCH X 10 YARD (5CM X 9,1M), 12 ROLLS/CASE: Brand: 3M™ MEDIPORE™